# Patient Record
Sex: MALE | Race: WHITE | NOT HISPANIC OR LATINO | Employment: OTHER | URBAN - NONMETROPOLITAN AREA
[De-identification: names, ages, dates, MRNs, and addresses within clinical notes are randomized per-mention and may not be internally consistent; named-entity substitution may affect disease eponyms.]

---

## 2017-01-02 ENCOUNTER — OFFICE VISIT - GICH (OUTPATIENT)
Dept: ORTHOPEDICS | Facility: OTHER | Age: 37
End: 2017-01-02

## 2017-01-02 ENCOUNTER — HISTORY (OUTPATIENT)
Dept: ORTHOPEDICS | Facility: OTHER | Age: 37
End: 2017-01-02

## 2017-01-02 DIAGNOSIS — M75.82 OTHER SHOULDER LESIONS, LEFT SHOULDER: ICD-10-CM

## 2017-01-02 DIAGNOSIS — M75.02 ADHESIVE CAPSULITIS OF LEFT SHOULDER: ICD-10-CM

## 2017-01-11 ENCOUNTER — HOSPITAL ENCOUNTER (OUTPATIENT)
Dept: PHYSICAL THERAPY | Facility: OTHER | Age: 37
Setting detail: THERAPIES SERIES
End: 2017-01-11
Attending: ORTHOPAEDIC SURGERY

## 2017-01-17 ENCOUNTER — HOSPITAL ENCOUNTER (OUTPATIENT)
Dept: PHYSICAL THERAPY | Facility: OTHER | Age: 37
Setting detail: THERAPIES SERIES
End: 2017-01-17
Attending: ORTHOPAEDIC SURGERY

## 2017-01-24 ENCOUNTER — HOSPITAL ENCOUNTER (OUTPATIENT)
Dept: PHYSICAL THERAPY | Facility: OTHER | Age: 37
Setting detail: THERAPIES SERIES
End: 2017-01-24
Attending: ORTHOPAEDIC SURGERY

## 2017-01-30 ENCOUNTER — HISTORY (OUTPATIENT)
Dept: ORTHOPEDICS | Facility: OTHER | Age: 37
End: 2017-01-30

## 2017-01-30 ENCOUNTER — OFFICE VISIT - GICH (OUTPATIENT)
Dept: ORTHOPEDICS | Facility: OTHER | Age: 37
End: 2017-01-30

## 2017-01-30 DIAGNOSIS — M75.02 ADHESIVE CAPSULITIS OF LEFT SHOULDER: ICD-10-CM

## 2017-01-30 DIAGNOSIS — M75.82 OTHER SHOULDER LESIONS, LEFT SHOULDER: ICD-10-CM

## 2017-01-31 ENCOUNTER — HOSPITAL ENCOUNTER (OUTPATIENT)
Dept: PHYSICAL THERAPY | Facility: OTHER | Age: 37
Setting detail: THERAPIES SERIES
End: 2017-01-31
Attending: ORTHOPAEDIC SURGERY

## 2017-02-02 ENCOUNTER — HOSPITAL ENCOUNTER (OUTPATIENT)
Dept: PHYSICAL THERAPY | Facility: OTHER | Age: 37
Setting detail: THERAPIES SERIES
End: 2017-02-02
Attending: ORTHOPAEDIC SURGERY

## 2017-03-15 ENCOUNTER — AMBULATORY - GICH (OUTPATIENT)
Dept: SCHEDULING | Facility: OTHER | Age: 37
End: 2017-03-15

## 2017-04-04 ENCOUNTER — AMBULATORY - GICH (OUTPATIENT)
Dept: SCHEDULING | Facility: OTHER | Age: 37
End: 2017-04-04

## 2017-06-23 ENCOUNTER — HISTORY (OUTPATIENT)
Dept: EMERGENCY MEDICINE | Facility: OTHER | Age: 37
End: 2017-06-23

## 2017-06-25 ENCOUNTER — HISTORY (OUTPATIENT)
Dept: EMERGENCY MEDICINE | Facility: OTHER | Age: 37
End: 2017-06-25

## 2017-06-30 ENCOUNTER — OFFICE VISIT - GICH (OUTPATIENT)
Dept: FAMILY MEDICINE | Facility: OTHER | Age: 37
End: 2017-06-30

## 2017-06-30 ENCOUNTER — HISTORY (OUTPATIENT)
Dept: FAMILY MEDICINE | Facility: OTHER | Age: 37
End: 2017-06-30

## 2017-06-30 DIAGNOSIS — S93.492D SPRAIN OF OTHER LIGAMENT OF LEFT ANKLE, SUBSEQUENT ENCOUNTER: ICD-10-CM

## 2017-07-11 ENCOUNTER — HISTORY (OUTPATIENT)
Dept: EMERGENCY MEDICINE | Facility: OTHER | Age: 37
End: 2017-07-11

## 2017-07-12 ENCOUNTER — OFFICE VISIT - GICH (OUTPATIENT)
Dept: ORTHOPEDICS | Facility: OTHER | Age: 37
End: 2017-07-12

## 2017-07-12 ENCOUNTER — HOSPITAL ENCOUNTER (OUTPATIENT)
Dept: RADIOLOGY | Facility: OTHER | Age: 37
End: 2017-07-12
Attending: ORTHOPAEDIC SURGERY

## 2017-07-12 ENCOUNTER — HISTORY (OUTPATIENT)
Dept: ORTHOPEDICS | Facility: OTHER | Age: 37
End: 2017-07-12

## 2017-07-12 DIAGNOSIS — S93.492D SPRAIN OF OTHER LIGAMENT OF LEFT ANKLE, SUBSEQUENT ENCOUNTER: ICD-10-CM

## 2017-07-12 DIAGNOSIS — S93.492A SPRAIN OF OTHER LIGAMENT OF LEFT ANKLE, INITIAL ENCOUNTER: ICD-10-CM

## 2017-07-21 ENCOUNTER — OFFICE VISIT - GICH (OUTPATIENT)
Dept: ORTHOPEDICS | Facility: OTHER | Age: 37
End: 2017-07-21

## 2017-07-21 ENCOUNTER — HISTORY (OUTPATIENT)
Dept: ORTHOPEDICS | Facility: OTHER | Age: 37
End: 2017-07-21

## 2017-07-21 DIAGNOSIS — M25.572 PAIN IN LEFT ANKLE: ICD-10-CM

## 2017-07-21 DIAGNOSIS — S93.492D SPRAIN OF OTHER LIGAMENT OF LEFT ANKLE, SUBSEQUENT ENCOUNTER: ICD-10-CM

## 2017-08-08 ENCOUNTER — HISTORY (OUTPATIENT)
Dept: ORTHOPEDICS | Facility: OTHER | Age: 37
End: 2017-08-08

## 2017-08-08 ENCOUNTER — OFFICE VISIT - GICH (OUTPATIENT)
Dept: ORTHOPEDICS | Facility: OTHER | Age: 37
End: 2017-08-08

## 2017-08-08 DIAGNOSIS — M25.572 PAIN IN LEFT ANKLE: ICD-10-CM

## 2017-08-17 ENCOUNTER — HOSPITAL ENCOUNTER (OUTPATIENT)
Dept: PHYSICAL THERAPY | Facility: OTHER | Age: 37
Setting detail: THERAPIES SERIES
End: 2017-08-17
Attending: ORTHOPAEDIC SURGERY

## 2017-08-17 DIAGNOSIS — M25.572 PAIN IN LEFT ANKLE: ICD-10-CM

## 2017-08-22 ENCOUNTER — HOSPITAL ENCOUNTER (OUTPATIENT)
Dept: PHYSICAL THERAPY | Facility: OTHER | Age: 37
Setting detail: THERAPIES SERIES
End: 2017-08-22
Attending: ORTHOPAEDIC SURGERY

## 2017-08-24 ENCOUNTER — HOSPITAL ENCOUNTER (OUTPATIENT)
Dept: PHYSICAL THERAPY | Facility: OTHER | Age: 37
Setting detail: THERAPIES SERIES
End: 2017-08-24
Attending: ORTHOPAEDIC SURGERY

## 2017-09-05 ENCOUNTER — HOSPITAL ENCOUNTER (OUTPATIENT)
Dept: PHYSICAL THERAPY | Facility: OTHER | Age: 37
Setting detail: THERAPIES SERIES
End: 2017-09-05
Attending: ORTHOPAEDIC SURGERY

## 2017-09-07 ENCOUNTER — HOSPITAL ENCOUNTER (OUTPATIENT)
Dept: PHYSICAL THERAPY | Facility: OTHER | Age: 37
Setting detail: THERAPIES SERIES
End: 2017-09-07
Attending: ORTHOPAEDIC SURGERY

## 2017-09-12 ENCOUNTER — HOSPITAL ENCOUNTER (OUTPATIENT)
Dept: PHYSICAL THERAPY | Facility: OTHER | Age: 37
Setting detail: THERAPIES SERIES
End: 2017-09-12
Attending: ORTHOPAEDIC SURGERY

## 2017-09-13 ENCOUNTER — OFFICE VISIT - GICH (OUTPATIENT)
Dept: ORTHOPEDICS | Facility: OTHER | Age: 37
End: 2017-09-13

## 2017-09-13 ENCOUNTER — HISTORY (OUTPATIENT)
Dept: ORTHOPEDICS | Facility: OTHER | Age: 37
End: 2017-09-13

## 2017-09-13 DIAGNOSIS — S93.492A SPRAIN OF OTHER LIGAMENT OF LEFT ANKLE, INITIAL ENCOUNTER: ICD-10-CM

## 2017-09-13 DIAGNOSIS — S93.492D SPRAIN OF OTHER LIGAMENT OF LEFT ANKLE, SUBSEQUENT ENCOUNTER: ICD-10-CM

## 2017-09-13 DIAGNOSIS — M25.572 PAIN IN LEFT ANKLE: ICD-10-CM

## 2017-09-14 ENCOUNTER — HOSPITAL ENCOUNTER (OUTPATIENT)
Dept: PHYSICAL THERAPY | Facility: OTHER | Age: 37
Setting detail: THERAPIES SERIES
End: 2017-09-14
Attending: ORTHOPAEDIC SURGERY

## 2017-09-28 ENCOUNTER — HOSPITAL ENCOUNTER (OUTPATIENT)
Dept: PHYSICAL THERAPY | Facility: OTHER | Age: 37
Setting detail: THERAPIES SERIES
End: 2017-09-28
Attending: ORTHOPAEDIC SURGERY

## 2017-10-03 ENCOUNTER — HOSPITAL ENCOUNTER (OUTPATIENT)
Dept: PHYSICAL THERAPY | Facility: OTHER | Age: 37
Setting detail: THERAPIES SERIES
End: 2017-10-03
Attending: ORTHOPAEDIC SURGERY

## 2017-10-05 ENCOUNTER — HOSPITAL ENCOUNTER (OUTPATIENT)
Dept: PHYSICAL THERAPY | Facility: OTHER | Age: 37
Setting detail: THERAPIES SERIES
End: 2017-10-05
Attending: ORTHOPAEDIC SURGERY

## 2017-10-12 ENCOUNTER — HOSPITAL ENCOUNTER (OUTPATIENT)
Dept: PHYSICAL THERAPY | Facility: OTHER | Age: 37
Setting detail: THERAPIES SERIES
End: 2017-10-12
Attending: ORTHOPAEDIC SURGERY

## 2017-10-18 ENCOUNTER — HISTORY (OUTPATIENT)
Dept: ORTHOPEDICS | Facility: OTHER | Age: 37
End: 2017-10-18

## 2017-10-18 ENCOUNTER — OFFICE VISIT - GICH (OUTPATIENT)
Dept: ORTHOPEDICS | Facility: OTHER | Age: 37
End: 2017-10-18

## 2017-10-18 DIAGNOSIS — S93.492D SPRAIN OF OTHER LIGAMENT OF LEFT ANKLE, SUBSEQUENT ENCOUNTER: ICD-10-CM

## 2017-10-18 DIAGNOSIS — G89.29 OTHER CHRONIC PAIN: ICD-10-CM

## 2017-10-18 DIAGNOSIS — M25.572 PAIN IN LEFT ANKLE: ICD-10-CM

## 2017-10-23 ENCOUNTER — HOSPITAL ENCOUNTER (OUTPATIENT)
Dept: PHYSICAL THERAPY | Facility: OTHER | Age: 37
Setting detail: THERAPIES SERIES
End: 2017-10-23
Attending: ORTHOPAEDIC SURGERY

## 2017-10-26 ENCOUNTER — HOSPITAL ENCOUNTER (OUTPATIENT)
Dept: PHYSICAL THERAPY | Facility: OTHER | Age: 37
Setting detail: THERAPIES SERIES
End: 2017-10-26
Attending: ORTHOPAEDIC SURGERY

## 2017-11-02 ENCOUNTER — HOSPITAL ENCOUNTER (OUTPATIENT)
Dept: PHYSICAL THERAPY | Facility: OTHER | Age: 37
Setting detail: THERAPIES SERIES
End: 2017-11-02
Attending: ORTHOPAEDIC SURGERY

## 2017-11-06 ENCOUNTER — HOSPITAL ENCOUNTER (OUTPATIENT)
Dept: PHYSICAL THERAPY | Facility: OTHER | Age: 37
Setting detail: THERAPIES SERIES
End: 2017-11-06
Attending: ORTHOPAEDIC SURGERY

## 2017-11-09 ENCOUNTER — HOSPITAL ENCOUNTER (OUTPATIENT)
Dept: PHYSICAL THERAPY | Facility: OTHER | Age: 37
Setting detail: THERAPIES SERIES
End: 2017-11-09
Attending: ORTHOPAEDIC SURGERY

## 2017-11-15 ENCOUNTER — OFFICE VISIT - GICH (OUTPATIENT)
Dept: ORTHOPEDICS | Facility: OTHER | Age: 37
End: 2017-11-15

## 2017-11-15 ENCOUNTER — HISTORY (OUTPATIENT)
Dept: ORTHOPEDICS | Facility: OTHER | Age: 37
End: 2017-11-15

## 2017-11-15 DIAGNOSIS — G89.29 OTHER CHRONIC PAIN: ICD-10-CM

## 2017-11-15 DIAGNOSIS — M25.572 PAIN IN LEFT ANKLE: ICD-10-CM

## 2017-11-17 ENCOUNTER — HOSPITAL ENCOUNTER (OUTPATIENT)
Dept: PHYSICAL THERAPY | Facility: OTHER | Age: 37
Setting detail: THERAPIES SERIES
End: 2017-11-17
Attending: ORTHOPAEDIC SURGERY

## 2017-11-21 ENCOUNTER — HOSPITAL ENCOUNTER (OUTPATIENT)
Dept: PHYSICAL THERAPY | Facility: OTHER | Age: 37
Setting detail: THERAPIES SERIES
End: 2017-11-21
Attending: ORTHOPAEDIC SURGERY

## 2017-11-27 ENCOUNTER — HOSPITAL ENCOUNTER (OUTPATIENT)
Dept: RADIOLOGY | Facility: OTHER | Age: 37
End: 2017-11-27
Attending: ORTHOPAEDIC SURGERY

## 2017-11-27 DIAGNOSIS — G89.29 OTHER CHRONIC PAIN: ICD-10-CM

## 2017-11-27 DIAGNOSIS — M25.572 PAIN IN LEFT ANKLE: ICD-10-CM

## 2017-12-05 ENCOUNTER — HISTORY (OUTPATIENT)
Dept: ORTHOPEDICS | Facility: OTHER | Age: 37
End: 2017-12-05

## 2017-12-05 ENCOUNTER — OFFICE VISIT - GICH (OUTPATIENT)
Dept: ORTHOPEDICS | Facility: OTHER | Age: 37
End: 2017-12-05

## 2017-12-05 ENCOUNTER — HOSPITAL ENCOUNTER (OUTPATIENT)
Dept: PHYSICAL THERAPY | Facility: OTHER | Age: 37
Setting detail: THERAPIES SERIES
End: 2017-12-05
Attending: ORTHOPAEDIC SURGERY

## 2017-12-05 DIAGNOSIS — S93.402D SPRAIN OF UNSPECIFIED LIGAMENT OF LEFT ANKLE, SUBSEQUENT ENCOUNTER: ICD-10-CM

## 2017-12-05 DIAGNOSIS — M25.572 PAIN IN LEFT ANKLE: ICD-10-CM

## 2017-12-05 DIAGNOSIS — G89.29 OTHER CHRONIC PAIN: ICD-10-CM

## 2017-12-07 ENCOUNTER — HOSPITAL ENCOUNTER (OUTPATIENT)
Dept: PHYSICAL THERAPY | Facility: OTHER | Age: 37
Setting detail: THERAPIES SERIES
End: 2017-12-07
Attending: ORTHOPAEDIC SURGERY

## 2017-12-27 NOTE — PROGRESS NOTES
"Patient Information     Patient Name MRN Sex Matt Thao [id number] Male 1980      Progress Notes by Franklin Martinez, PT at 2017  1:55 PM     Author:  Franklin Martinez, PT Service:  (none) Author Type:  PT- Physical Therapist     Filed:  2017  3:30 PM Date of Service:  2017  1:55 PM Status:  Signed     :  Franklin Martinez PT (PT- Physical Therapist)            [facility]  Outpatient PT - Daily Note       Date of Service: 2017       Visit 3 of 12 authorized by Work Comp. ( An additional 12 visits was approved on 10/5/17)    G Codes updated 10/5/2017     Patient Name: Matt May   YOB: 1980   Referring MD/Provider: Dr. So  Medical and Treatment Diagnosis: Left ankle pain, unspecified chronicity   PT Treatment Diagnosis: pain, weakness, impaired ROM, impaired gait, impaired neuromuscular control  Insurance: Work Comp  Start of Service: 17  Certification Dates: 10/5/17 to 17              Subjective        Patient reports benefit of phonophoresis treatment last session.  He remains compliant with the HEP and current work restrictions.       Rates pain: 2/10 pain at rest.                  Objective        Patient localizes pain to the distal peroneal tendons.    Today's Intervention:      Phonophoresis x 10 minutes, 3 mhz, 1.0 w/cm2 to the distal peroneal tendons     Nustep x 6 minutes, seat 15, level 6--- legs only    Slant board stretch for left calf----pin at 3 on slant board    Floor to waist crate lift x 10 with 20#, x 10 with 30#    Push sled 50 feet with 25#, then pull back 50 feet     MedX HS curl, seat 9, 120# x 15, 2 sets  MedX Hip abduction 100# x 15, 2 sets  MedX knee extension, seat 9, 80# x 15, 2 sets     Step up on to 6\" step x 30       Patient will ice at home today.       Home Exercise Program:    Cold pack and elevation for pain and inflammation control  Warm water for ROM exercises may be helpful  Flex and " "extend toes   Active PF/DF  Ankle circles x 20 each way   Ankle alphabet   Ankle PF with  band x 20  Ankle DF with band x 20  Towel scrunch   Ankle IV and EV AROM  Standing calf stretch 3 x 30\"   SLR x 15  Side hip abduction x 15  Ankle IV and EV with band  Weight shifting  Mini squat  Double calf raise          Assessment    Therapist Assessment / Clinical Impression: Patient is making gradual progress with ROM, strength, neuromuscular control, and gait.  Pain and swelling are gradually improving.  Patient requires additional PT treatment to progress the plan of care and maximize function.     Functional Impairment(s): See subjective on initial evaluation and Functional Assessment / Summary Report from [facility].    Physical Impairment(s):  pain, weakness, impaired ROM, impaired gait, impaired neuromuscular control      Patient Specific Functional and Pain Scales (PSFS): G Codes updated 10/5/2017   Clinician Instructions: Complete after the history and before the exam.    Initial Assessment: We want to know what 3 activities in your life you are unable to perform, or are having the most difficulty performing, as a result of your chief problem. Please list and score at least 3 activities that you are unable to perform, or having the most difficulty performing, because of your chief problem.   Patient Specific Activity Scoring Scheme (score one number for each activity):   Activity Score (0-10)  0= Unable to perform activity  10= Able to perform activity at same level as before injury or problem   1. Walking with full weight on left foot  6/10   2. Single leg stand on left foot 0/10   3. Squatting  5/10   4. Work  0/10   5.     Totals:  11/40 =27 % ability which relates to 73% impairment    Patient verbally states that they understand that the information they have provided above is current and complete to the best of their knowledge.    Patient Specific Functional Scale Modifier Scale Conversion: (patient's modifier that " correlates with pt's score on PSFS): CL (73% Impaired).    G codes and Modifier taken from pt completing a PSFS: completed at initial evaluation   Initial Primary G Code and Modifier:    Per the Patient's intake and/or assessment the Primary G Code is: Mobility .   The Patient's Impairment, Limitation or Restriction Modifier would be best described as: CN - 100% Impairment.     Goal Primary G Code and Modifier:    The Patient's G Code Goal would be: Mobility    The Patient's Impairment, Limitation or Restriction Modifier goal would be best described as: CJ - 20% - 40% Impairment.       G Codes updated 10/5/2017  Per the Patient's intake and/or assessment the Primary G Code is Mobility .  The Patient's Impairment, Limitation or Restriction Modifier would be best described as CL - 60% - 80% Impairment.     The Patient's G Code Goal would be Mobility   The Patient's Impairment, Limitation or Restriction Modifier would be best described as CJ - 20% - 40% Impairment.       Goals: Reviewed 10/5/2017   Functional Short Term Goals (4 weeks):   Patient will walk with a normal gait pattern. Progressing  Patient will be able to sleep with no disruptions due to pain. Progressing  Patient will have improved lower extremity mobility to allow for improved dressing and self-cares with minimal to no pain. Progressing    Functional Long Term Goals (8 weeks):   Patient will self-manage symptoms and return to prior level of function.  Progressing  Patient will be independent in their Home Exercise Program.Progressing  Patient will tolerate walking with less than 2/10 pain up to 30 minutes. Progressing  Patient will complete stairs with minimal to no pain. Progressing  Patient will demonstrate improved strength to allow squatting and lifting activities with no difficulty completing work related tasks. Progressing  Stand on left foot without pain. Progressing      Patient participated in goal selection and  understand(s) the plan of care: Yes   Patient Potential for Achieving Desired Outcome: Good       Response to Intervention:  Good response to treatment. Patient verbalized understanding of HEP.         Plan    Treatment Plan:      Frequency:   12 visits    Duration of Treatment: 8 weeks    Planned Interventions:    Home Exercise Program development  Therapeutic Exercise (ROM & Strengthening)  Therapeutic Activities  Neuromuscular Re-education  Manual Therapy  Ultrasound  E-Stim  Gait Training  Hot Packs / Cold Pack Modalities  Vasopneumatic Compression with Cold Therapy ( Game Ready )  Phonophoresis with Ketoprofen  Iontophoresis with Dexamethasone      Plan for next visit:  Progress exercises as symptoms allow.  Manual therapy and modalities as indicated.

## 2017-12-27 NOTE — PROGRESS NOTES
"Patient Information     Patient Name MRN Sex Matt Thao [id number] Male 1980      Progress Notes by Franklin Martinez, PT at 2017  3:26 PM     Author:  Franklin Martinez, PT Service:  (none) Author Type:  PT- Physical Therapist     Filed:  2017  5:22 PM Date of Service:  2017  3:26 PM Status:  Signed     :  Franklin Martinez PT (PT- Physical Therapist)            [facility]  Outpatient PT - Daily Note     Date of Service: 2017     Visit 7  12 authorized by Work Comp.     Patient Name: Matt May   YOB: 1980   Referring MD/Provider: Dr. So  Medical and Treatment Diagnosis: Left ankle pain, unspecified chronicity   PT Treatment Diagnosis: pain, weakness, impaired ROM, impaired gait, impaired neuromuscular control  Insurance: Work Comp  Start of Service: 17  Certification Dates: Start of Service: 17  Medicare/[place] Re-Cert Due: 10/12/17               Subjective      Patient had a follow up with Dr. So on 17.  He was given the ok to begin weaning from the CAM boot.  He will be getting a lace up ankle brace next week.  Patient remains off work at this time.       Rates pain: 3/10 pain at rest.   Left heel pain increases to a 6/10.                 Objective          Today's Intervention:      Nustep x 5 minutes, seat 15, arms 10, level 4     Exercises:  Standing calf stretch   Left calf stretch with towel 3 x 30\"----Dorsiflexion to neutral    Ankle PF with black band x 20  Ankle DF with red x 20    Added:  Standing calf stretch 3 x 30\"   SLR x 15  Side hip abduction x 15  Ankle IV and EV with yellow    Game Ready x 15 minutes, medium compression to left ankle      Home Exercise Program:    Discussed use of cold pack and elevation for pain and inflammation control  Warm water for ROM exercises may be helpful  Flex and extend toes   Active PF/DF  Ankle circles x 20 each way   Ankle alphabet   Left calf stretch with towel 3 x " "30\"  Ankle PF with green band x 20  Ankle DF with red x 20  Towel scrunch  Weight shifting onto left foot with canes   Ankle IV and EV AROM  Standing calf stretch 3 x 30\"   SLR x 15  Side hip abduction x 15  Ankle IV and EV with yellow        Assessment    Therapist Assessment / Clinical Impression:  Signs and symptoms consistent with left lateral ankle sprain.  The patient is appropriate for physical therapy to address their pain, functional limitations, and physical impairments.     Functional Impairment(s): See subjective on initial evaluation and Functional Assessment / Summary Report from [facility].    Physical Impairment(s):  pain, weakness, impaired ROM, impaired gait, impaired neuromuscular control      Patient Specific Functional and Pain Scales (PSFS):    Clinician Instructions: Complete after the history and before the exam.    Initial Assessment: We want to know what 3 activities in your life you are unable to perform, or are having the most difficulty performing, as a result of your chief problem. Please list and score at least 3 activities that you are unable to perform, or having the most difficulty performing, because of your chief problem.   Patient Specific Activity Scoring Scheme (score one number for each activity):   Activity Score (0-10)  0= Unable to perform activity  10= Able to perform activity at same level as before injury or problem   1. Walking with full weight on left foot  0/10   2. Single leg stand on left foot 0/10   3. Squatting  0/10   4. Work  0/10   5.     Totals:  0/40 = 0 % ability which relates to 100% impairment    Patient verbally states that they understand that the information they have provided above is current and complete to the best of their knowledge.    Patient Specific Functional Scale Modifier Scale Conversion: (patient's modifier that correlates with pt's score on PSFS): 0-CN (100% Impaired).    G codes and Modifier taken from pt completing a PSFS: completed at initial " evaluation   Initial Primary G Code and Modifier:    Per the Patient's intake and/or assessment the Primary G Code is: Mobility .   The Patient's Impairment, Limitation or Restriction Modifier would be best described as: CN - 100% Impairment.     Goal Primary G Code and Modifier:    The Patient's G Code Goal would be: Mobility    The Patient's Impairment, Limitation or Restriction Modifier goal would be best described as: CJ - 20% - 40% Impairment.         Goals:  Functional Short Term Goals (4 weeks):   Patient will walk with a normal gait pattern.  Patient will be able to sleep with no disruptions due to pain.  Patient will have improved lower extremity mobility to allow for improved dressing and self-cares with minimal to no pain.    Functional Long Term Goals (8 weeks):   Patient will self-manage symptoms and return to prior level of function.    Patient will be independent in their Home Exercise Program.  Patient will tolerate walking with less than 2/10 pain up to 30 minutes.  Patient will complete stairs with minimal to no pain.  Patient will demonstrate improved strength to allow squatting and lifting activities with no difficulty completing work related tasks.  Stand on left foot without pain.       Patient participated in goal selection and understand(s) the plan of care: Yes   Patient Potential for Achieving Desired Outcome: Good       Response to Intervention:  Good response to treatment. Patient verbalized understanding of HEP.       Plan    Treatment Plan:      Frequency:   12 visits    Duration of Treatment: 8 weeks    Planned Interventions:    Home Exercise Program development  Therapeutic Exercise (ROM & Strengthening)  Therapeutic Activities  Neuromuscular Re-education  Manual Therapy  Ultrasound  E-Stim  Gait Training  Hot Packs / Cold Pack Modalities  Vasopneumatic Compression with Cold Therapy ( Game Ready )  Phonophoresis with Ketoprofen  Iontophoresis with Dexamethasone      Plan for  next visit:  Progress exercises as symptoms allow.  Manual therapy and modalities as indicated.

## 2017-12-27 NOTE — PROGRESS NOTES
"Patient Information     Patient Name MRN Sex Matt Thao [id number] Male 1980      Progress Notes by Franklin Martinez, PT at 2017  2:31 PM     Author:  Franklin Martinez PT Service:  (none) Author Type:  PT- Physical Therapist     Filed:  2017  3:24 PM Date of Service:  2017  2:31 PM Status:  Signed     :  Franklin Martinez PT (PT- Physical Therapist)            [facility]  Outpatient PT - Daily Note       Date of Service: 2017       Visit 4 of 12 authorized by Work Comp. ( An additional 12 visits was approved on 10/5/17)    G Codes updated 10/5/2017     Patient Name: Matt May   YOB: 1980   Referring MD/Provider: Dr. So  Medical and Treatment Diagnosis: Left ankle pain, unspecified chronicity   PT Treatment Diagnosis: pain, weakness, impaired ROM, impaired gait, impaired neuromuscular control  Insurance: [employer]  Start of Service: 17  Certification Dates: 10/5/17 to 17              Subjective        Patient reports the medial and lateral ankle remain painful.  States when he takes a full step on the left without the cane he has pain. He does feel that he is gradually improving overall.        Rates pain: 2/10 pain at rest.                  Objective        Patient localizes pain to the distal peroneal tendons.    Today's Intervention:      Phonophoresis x 10 minutes, 3 mhz, 1.0 w/cm2 to the distal peroneal tendons     Nustep x 6 minutes, seat 15, level 6--- legs only    Slant board stretch for left calf----pin at 3 on slant board    Floor to waist crate lift x 10 with 20#, x 10 with 30#    Push sled 50 feet with 25#, then pull back 50 feet x 2     MedX HS curl, seat 9, 120# x 15, 2 sets  MedX Hip abduction 100# x 30, 2 sets  MedX knee extension, seat 9, 80# x 15, 2 sets     Step up on to 6\" step x 35      Patient will ice at home today.       Home Exercise Program:    Cold pack and elevation for pain and inflammation " "control  Warm water for ROM exercises may be helpful  Flex and extend toes   Active PF/DF  Ankle circles x 20 each way   Ankle alphabet   Ankle PF with  band x 20  Ankle DF with band x 20  Towel scrunch   Ankle IV and EV AROM  Standing calf stretch 3 x 30\"   SLR x 15  Side hip abduction x 15  Ankle IV and EV with band  Weight shifting  Mini squat  Double calf raise          Assessment    Therapist Assessment / Clinical Impression: Patient is making gradual progress with ROM, strength, neuromuscular control, and gait.  Pain and swelling are gradually improving.  Patient requires additional PT treatment to progress the plan of care and maximize function.     Functional Impairment(s): See subjective on initial evaluation and Functional Assessment / Summary Report from [facility].    Physical Impairment(s):  pain, weakness, impaired ROM, impaired gait, impaired neuromuscular control      Patient Specific Functional and Pain Scales (PSFS): G Codes updated 10/5/2017   Clinician Instructions: Complete after the history and before the exam.    Initial Assessment: We want to know what 3 activities in your life you are unable to perform, or are having the most difficulty performing, as a result of your chief problem. Please list and score at least 3 activities that you are unable to perform, or having the most difficulty performing, because of your chief problem.   Patient Specific Activity Scoring Scheme (score one number for each activity):   Activity Score (0-10)  0= Unable to perform activity  10= Able to perform activity at same level as before injury or problem   1. Walking with full weight on left foot  6/10   2. Single leg stand on left foot 0/10   3. Squatting  5/10   4. Work  0/10   5.     Totals:  11/40 =27 % ability which relates to 73% impairment    Patient verbally states that they understand that the information they have provided above is current and complete to the best of their knowledge.    Patient Specific " Functional Scale Modifier Scale Conversion: (patient's modifier that correlates with pt's score on PSFS): CL (73% Impaired).    G codes and Modifier taken from pt completing a PSFS: completed at initial evaluation   Initial Primary G Code and Modifier:    Per the Patient's intake and/or assessment the Primary G Code is: Mobility .   The Patient's Impairment, Limitation or Restriction Modifier would be best described as: CN - 100% Impairment.     Goal Primary G Code and Modifier:    The Patient's G Code Goal would be: Mobility    The Patient's Impairment, Limitation or Restriction Modifier goal would be best described as: CJ - 20% - 40% Impairment.       G Codes updated 10/5/2017  Per the Patient's intake and/or assessment the Primary G Code is Mobility .  The Patient's Impairment, Limitation or Restriction Modifier would be best described as CL - 60% - 80% Impairment.     The Patient's G Code Goal would be Mobility   The Patient's Impairment, Limitation or Restriction Modifier would be best described as CJ - 20% - 40% Impairment.       Goals: Reviewed 10/5/2017   Functional Short Term Goals (4 weeks):   Patient will walk with a normal gait pattern. Progressing  Patient will be able to sleep with no disruptions due to pain. Progressing  Patient will have improved lower extremity mobility to allow for improved dressing and self-cares with minimal to no pain. Progressing    Functional Long Term Goals (8 weeks):   Patient will self-manage symptoms and return to prior level of function.  Progressing  Patient will be independent in their Home Exercise Program.Progressing  Patient will tolerate walking with less than 2/10 pain up to 30 minutes. Progressing  Patient will complete stairs with minimal to no pain. Progressing  Patient will demonstrate improved strength to allow squatting and lifting activities with no difficulty completing work related tasks. Progressing  Stand on left foot without pain.  Progressing      Patient participated in goal selection and understand(s) the plan of care: Yes   Patient Potential for Achieving Desired Outcome: Good       Response to Intervention:  Good response to treatment. Patient verbalized understanding of HEP.         Plan    Treatment Plan:      Frequency:   12 visits    Duration of Treatment: 8 weeks    Planned Interventions:    Home Exercise Program development  Therapeutic Exercise (ROM & Strengthening)  Therapeutic Activities  Neuromuscular Re-education  Manual Therapy  Ultrasound  E-Stim  Gait Training  Hot Packs / Cold Pack Modalities  Vasopneumatic Compression with Cold Therapy ( Game Ready )  Phonophoresis with Ketoprofen  Iontophoresis with Dexamethasone      Plan for next visit:  Progress exercises as symptoms allow.  Manual therapy and modalities as indicated.

## 2017-12-27 NOTE — PROGRESS NOTES
Patient Information     Patient Name MRN Sex Matt Thao [id number] Male 1980      Progress Notes by Dontrell So DO at 2017  1:15 PM     Author:  Dontrell So DO Service:  (none) Author Type:  PHYS- Osteopathic     Filed:  2017  2:36 PM Encounter Date:  2017 Status:  Signed     :  Dontrell So DO (PHYS- Osteopathic)            Matt May was seen in consultation for Abel Gan MD for a chief complaint of left ankle injury and pain    CHIEF COMPLAINT: Matt May is a 36 y.o.  male  Chief Complaint     Patient presents with       Occ Med      left ankle injury       HISTORY OF PRESENTING INJURY:  This 36-year-old male relates he sustained a left ankle injury at work on 17. The patient jumped off a truck. He landed on a 3 inch hose and rolled his ankle. He describes a supination, inversion type injury. Relates he experienced pain to the outside of the ankle region. Noticed swelling. Seen in the emergency room that day. X-rays of the ankle were taken. No fractures identified. He returned to the emergency room 2 days later with complaint of continued ankle pain. A CAT scan of the ankle was performed on 17. A venous ultrasound of the lower extremity was performed on 17. Negative for a DVT.  The patient was referred to orthopedics.  Relates he has crutches available. He had a knee scooter that he borrowed from someone but found it difficult to use. He is trying to keep weight off the foot.  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  No complaint of knee pain.  Soreness in the calf.    REVIEW OF SYSTEMS:  Constitutional:  Denies constitutional problems  Cardiovascular: normal  Respiratory: normal    The review of systems as documented in the HPI and on the intake questionnaire, completed by the patient 2017, have been reviewed by myself and the pertinent positives and negatives addressed.  The remainder of the complete  review of systems was non-contributory.    (PFSH) PAST, FAMILY, and/or SOCIAL HISTORY:    PAST MEDICAL HISTORY:  Past Medical History:     Diagnosis  Date     Adhesive capsulitis of left shoulder 11/29/2016     GERD (gastroesophageal reflux disease)      Mantoux: positive 2009    negative chest x-ray. Completed isoniazid.      Tendinitis of left rotator cuff 11/28/2016       PAST SURGICAL HISTORY:  Past Surgical History:      Procedure  Laterality Date     APPENDECTOMY  2005       FAMILY HISTORY:  No family history on file.    ALLERGIES:  Allergies     Allergen  Reactions     Codeine Rash and Itching     Pcn [Penicillins] Hives and Edema       CURRENT MEDICATIONS:  Current Outpatient Prescriptions       Medication  Sig Dispense Refill     cyclobenzaprine (FLEXERIL) 10 mg tablet Take 1 tablet by mouth 3 times daily. 30 tablet 3     ibuprofen (ADVIL; MOTRIN) 200 mg tablet Take 800 mg by mouth every 6 hours if needed.       oxyCODONE 10 mg tablet Take 1 tablet by mouth every 4 hours if needed  for Pain 25 tablet 0     Walker Rolling Walker for home use. 1 Device 0     No current facility-administered medications for this visit.      Medications have been reviewed by me and are current to the best of my knowledge and ability.      SOCIAL HISTORY:    Additional [facility] information documented on the intake form completed by the patient 7/12/2017 was reviewed by myself.    PHYSICAL EXAM:   /82  Pulse 84  Ht 1.829 m (6')  Wt 133.4 kg (294 lb)  BMI 39.87 kg/m2 Body mass index is 39.87 kg/(m^2).    General Appearance: Pleasant male in good appearance, mood and affect.  Alert and orientated times three ( time, date and location).    left ankle/ foot:  Examination demonstrates no left knee effusion. Comfortable knee motion. Soreness to the soft tissues of the left calf with palpation. The calf is supple.  The Achilles tendon appears palpable and intact.  Large amount of soft tissue swelling around the left ankle. Pain  with palpation to the lateral ankle complex. Mild to moderate pain along the medial ankle complex.  Increased pain with active or passive ankle motion.  Palpable dorsalis pedis pulse.  Generalized moderate swelling of the foot.  Sensory intact.    Xray and or MRI/MRA:  Radiographic images where independently reviewed by myself and discussed with the patient.  Attending Doctor: ZACHARIAH WILLIS ([id number])  :       FLACO MICHEL ([id number])  Report Date:       06/23/2017 10:18:11  Report Status:       Final  ======================= Begin of Report Content ======================    PROCEDURE: XR ANKLE 3 VIEWS LEFT  HISTORY: ANKLE INJURY; .  COMPARISON: 01/11/2012  TECHNIQUE: 3 views of the left ankle were obtained.  FINDINGS: No fracture or dislocation is identified. The joint spaces are preserved. There is marked soft tissue swelling laterally.  IMPRESSION: No acute fracture.  Electronically Signed By: Flaco Michel M.D. on 6/23/2017 10:13 AM    Attending Doctor: SANIYA KRAFT ([id number])  :       DAISY STONE ([id number])  Report Date:       06/25/2017 17:02:51  Report Status:       Final  ======================= Begin of Report Content ======================    EXAM:CT ANKLE LEFT WO  REASON FOR EXAM:ANKLE PAIN/PROBLEM;  CLINICAL HISTORY: Patient Age 36 years  COMPARISON: Conventional radiograph 06/23/2017  TECHNIQUE: Axial images acquired without contrast with coronal and sagittal two-dimensional reformatted images  CONTRAST: None  FINDINGS: No ankle fracture is identified. There is marked diffuse subcutaneous edema about the left ankle. Focal hematoma adjacent to the lateral malleolus measuring approximately 3.5 x 1.5 x 4.1 cm in craniocaudad, transverse, and AP dimension. Small ankle effusion, including fluid in the anterolateral ankle recess. The anterior talofibular ligament is visualized and is likely torn. This would be better evaluated with MRI. Ankle tendons appear grossly normal. No  appreciable degenerative arthritis.    IMPRESSION:  1. Probable high-grade or full-thickness tearing anterior talofibular ligament.  2. Diffuse soft tissue edema about the left ankle with focal hematoma adjacent to the lateral malleolus.  3. Small ankle effusion.  4. No fracture identified.  Electronically Signed By: Sarah Gallego M.D. on 6/25/2017 4:58 PM    Attending Doctor: THOMAS HUSSEIN ([id number])  :       MORGAN HUGHES ([id number])  Report Date:       07/11/2017 19:07:53  Report Status:       Final  ======================= Begin of Report Content ======================    Exam:US VENOUS LOWER EXTREMITY LEFT  History: Left leg pain  Comparisons:None.  Technique: Venous ultrasound left lower extremities  Findings: There is normal compressibility, augmentation and respiratory phasicity in the common femoral superficial femoral popliteal and calf veins. Normal compressiblity is seen in the greater saphenous vein.    Impression: No evidence of deep venous thrombosis in the left lower extremity  Electronically Signed By: Morgan Hughes M.D. on 7/11/2017 7:03 PM    X-rays of the left tibia and fibula demonstrates no fractures.     IMPRESSION:  Left ankle sprain, severe with history of supination injury.  History of injury occurring at work on 6/23/17, jumping off a truck.  Swelling of the left ankle and foot.  No fractures identified.    PLAN:  Discussion included review of studies.  Recommendation includes cast immobilization of the ankle.  The patient tried a Cam Walker but was unsuccessful with use.  Also tried a knee scooter but had difficulty. Does better with crutches.    Procedure: The patient was taken to the cast room. A left short leg fiberglass cast was applied. Well-padded.  Cast care instructions.    Recommend continued use of crutches and balance weight only on the foot.  Otherwise ice and elevate above the heart.    Paperwork filled out: No work at this point. Recheck in about 10 days.  No x-rays needed.  Recommend casting about 4 weeks.    Questions answered.    Dontrell So D.O., SARTHAK.  Orthopedic Surgeon    [facility] and [address] [address]  Phone [phone]  Fax [phone]    2:24 PM 7/12/2017

## 2017-12-27 NOTE — PROGRESS NOTES
"Patient Information     Patient Name MRN Sex Matt Thao [id number] Male 1980      Progress Notes by Franklin Martinez, PT at 10/12/2017  3:18 PM     Author:  Franklin Martinez, PT Service:  (none) Author Type:  PT- Physical Therapist     Filed:  10/12/2017  4:09 PM Date of Service:  10/12/2017  3:18 PM Status:  Signed     :  Franklin Martinez PT (PT- Physical Therapist)            [facility]  Outpatient PT - Daily Note       Date of Service: 10/12/2017      Visit 11of 12 authorized by Work Comp. ( An additional 12 visits was approved on 10/5/17)    G Codes updated 10/5/2017     Patient Name: Matt May   YOB: 1980   Referring MD/Provider: Dr. oS  Medical and Treatment Diagnosis: Left ankle pain, unspecified chronicity   PT Treatment Diagnosis: pain, weakness, impaired ROM, impaired gait, impaired neuromuscular control  Insurance: [employer] Comp  Start of Service: 17  Certification Dates: 10/5/17 to 17              Subjective        Patient reports gradual progress with the left ankle.  He is starting to have more good days than bad days.  He remains compliant with the HEP and current work restrictions.     Rates pain: 2-3/10 pain at rest.                  Objective            Today's Intervention:      Nustep x 5 minutes, seat 15, arms 10, level 4     Slant board stretch for left calf----pin at 3 on slant board    Exercises:  Weight shifting----fwd,back, side/side  Single leg stand with UE support, hold 5\"   Mini squat x 20  Double calf raise x 20  Marching     Grade 3 talocrural mobs   Grade 3 distraction of talocrural joint  Manual achilles stretch with patient in long sitting    Game ready x 10 minutes, low compression, left ankle      Home Exercise Program:    Cold pack and elevation for pain and inflammation control  Warm water for ROM exercises may be helpful  Flex and extend toes   Active PF/DF  Ankle circles x 20 each way   Ankle alphabet " "  Ankle PF with  band x 20  Ankle DF with band x 20  Towel scrunch   Ankle IV and EV AROM  Standing calf stretch 3 x 30\"   SLR x 15  Side hip abduction x 15  Ankle IV and EV with band  Weight shifting  Mini squat  Double calf raise          Assessment    Therapist Assessment / Clinical Impression: Patient is making gradual progress with ROM, strength, neuromuscular control, and gait.  Pain and swelling are gradually improving.  Patient requires additional PT treatment to progress the plan of care and maximize function.     Functional Impairment(s): See subjective on initial evaluation and Functional Assessment / Summary Report from [facility].    Physical Impairment(s):  pain, weakness, impaired ROM, impaired gait, impaired neuromuscular control      Patient Specific Functional and Pain Scales (PSFS): G Codes updated 10/5/2017   Clinician Instructions: Complete after the history and before the exam.    Initial Assessment: We want to know what 3 activities in your life you are unable to perform, or are having the most difficulty performing, as a result of your chief problem. Please list and score at least 3 activities that you are unable to perform, or having the most difficulty performing, because of your chief problem.   Patient Specific Activity Scoring Scheme (score one number for each activity):   Activity Score (0-10)  0= Unable to perform activity  10= Able to perform activity at same level as before injury or problem   1. Walking with full weight on left foot  6/10   2. Single leg stand on left foot 0/10   3. Squatting  5/10   4. Work  0/10   5.     Totals:  11/40 =27 % ability which relates to 73% impairment    Patient verbally states that they understand that the information they have provided above is current and complete to the best of their knowledge.    Patient Specific Functional Scale Modifier Scale Conversion: (patient's modifier that correlates with pt's score on PSFS): CL (73% Impaired).    G codes and " Modifier taken from pt completing a PSFS: completed at initial evaluation   Initial Primary G Code and Modifier:    Per the Patient's intake and/or assessment the Primary G Code is: Mobility .   The Patient's Impairment, Limitation or Restriction Modifier would be best described as: CN - 100% Impairment.     Goal Primary G Code and Modifier:    The Patient's G Code Goal would be: Mobility    The Patient's Impairment, Limitation or Restriction Modifier goal would be best described as: CJ - 20% - 40% Impairment.       G Codes updated 10/5/2017  Per the Patient's intake and/or assessment the Primary G Code is Mobility .  The Patient's Impairment, Limitation or Restriction Modifier would be best described as CL - 60% - 80% Impairment.     The Patient's G Code Goal would be Mobility   The Patient's Impairment, Limitation or Restriction Modifier would be best described as CJ - 20% - 40% Impairment.       Goals: Reviewed 10/5/2017   Functional Short Term Goals (4 weeks):   Patient will walk with a normal gait pattern. Progressing  Patient will be able to sleep with no disruptions due to pain. Progressing  Patient will have improved lower extremity mobility to allow for improved dressing and self-cares with minimal to no pain. Progressing    Functional Long Term Goals (8 weeks):   Patient will self-manage symptoms and return to prior level of function.  Progressing  Patient will be independent in their Home Exercise Program.Progressing  Patient will tolerate walking with less than 2/10 pain up to 30 minutes. Progressing  Patient will complete stairs with minimal to no pain. Progressing  Patient will demonstrate improved strength to allow squatting and lifting activities with no difficulty completing work related tasks. Progressing  Stand on left foot without pain. Progressing      Patient participated in goal selection and understand(s) the plan of care: Yes   Patient Potential for Achieving Desired  Outcome: Good       Response to Intervention:  Good response to treatment. Patient verbalized understanding of HEP.         Plan    Treatment Plan:      Frequency:   12 visits    Duration of Treatment: 8 weeks    Planned Interventions:    Home Exercise Program development  Therapeutic Exercise (ROM & Strengthening)  Therapeutic Activities  Neuromuscular Re-education  Manual Therapy  Ultrasound  E-Stim  Gait Training  Hot Packs / Cold Pack Modalities  Vasopneumatic Compression with Cold Therapy ( Game Ready )  Phonophoresis with Ketoprofen  Iontophoresis with Dexamethasone      Plan for next visit:  Progress exercises as symptoms allow.  Manual therapy and modalities as indicated.

## 2017-12-27 NOTE — PROGRESS NOTES
Patient Information     Patient Name MRN Sex Matt Thao [id number] Male 1980      Progress Notes by Abel Gan MD at 2017 12:45 PM     Author:  Abel Gan MD Service:  (none) Author Type:  Physician     Filed:  2017  1:11 PM Encounter Date:  2017 Status:  Signed     :  Abel Gan MD (Physician)            SUBJECTIVE:    Matt May is a 36 y.o. male who presents for left ankle injury    HPI    On  at work he got tangled in a  hose and rolled his ankle.  He is not sure if it was an inversion or eversion injury.  Was seen in the emergency department, placed in a CAM walker.  Has not been able to put it on due to the edema.  [place] a snap when it happened. Lots of pain and burning laterally.  Ice and elevation not helping.  Cannot put weight on it.  5 mg oxycodone not helping.  Had a follow up in the emergency department due to the pain, and had a CT showing:    IMPRESSION:  1. Probable high-grade or full-thickness tearing anterior talofibular ligament.  2. Diffuse soft tissue edema about the left ankle with focal hematoma adjacent to the lateral malleolus.  3. Small ankle effusion.  4. No fracture identified.     Electronically Signed By: Sarah Gallego M.D. on 2017 4:58 PM    Allergies     Allergen  Reactions     Codeine Rash and Itching     Pcn [Penicillins] Hives and Edema   ,   Current Outpatient Prescriptions on File Prior to Visit       Medication  Sig Dispense Refill     cyclobenzaprine (FLEXERIL) 10 mg tablet Take 1 tablet by mouth 3 times daily. 30 tablet 3     ibuprofen (ADVIL; MOTRIN) 600 mg tablet Take 600 mg by mouth 4 times daily if needed. Maximum of 3200 mg in 24 hours.       Walker Rolling Walker for home use. 1 Device 0     No current facility-administered medications on file prior to visit.     and   Past Surgical History:      Procedure  Laterality Date     APPENDECTOMY         REVIEW OF SYSTEMS:  Review of Systems    Constitutional: Negative for chills and fever.   Musculoskeletal: Positive for joint pain.   Neurological: Positive for tingling.       OBJECTIVE:  /80  Pulse 80  Ht 1.829 m (6')  Wt 133.4 kg (294 lb)  BMI 39.87 kg/m2    EXAM:   Physical Exam   Constitutional: He is oriented to person, place, and time and well-developed, well-nourished, and in no distress. No distress.   Musculoskeletal:   Left ankle with significant edema diffusely.  Tender over the lat malleolus.  Unable to move in any direction at all due to pain.  ecchymosis into the toes and medial foot.   Neurological: He is alert and oriented to person, place, and time.   Skin: He is not diaphoretic.       ASSESSMENT/PLAN:    ICD-10-CM    1. Tear of talofibular ligament, left, subsequent encounter S93.492D AMB CONSULT TO ORTHOPEDICS - AFFILIATE ONLY      oxyCODONE 10 mg tablet        Plan:  Will increase the dose of the oxycodone.  I want some compression for the edema, so will use an ACE wrap for now while waiting for Orthopedics consult.  No weight bearing.  No work, given need to elevate above heart level.    Abel Gan MD ....................  6/30/2017   1:11 PM

## 2017-12-27 NOTE — PROGRESS NOTES
Patient Information     Patient Name MRN Sex Matt Thao [id number] Male 1980      Progress Notes by Franklin Martinez, PT at 10/3/2017  3:16 PM     Author:  Franklin Martinez PT Service:  (none) Author Type:  PT- Physical Therapist     Filed:  10/3/2017  4:32 PM Date of Service:  10/3/2017  3:16 PM Status:  Signed     :  Franklin Martinez PT (PT- Physical Therapist)            [facility]  Outpatient PT - Daily Note     Date of Service: 10/3/2017     Visit  authorized by Work Comp.     Patient Name: Matt May   YOB: 1980   Referring MD/Provider: Dr. So  Medical and Treatment Diagnosis: Left ankle pain, unspecified chronicity   PT Treatment Diagnosis: pain, weakness, impaired ROM, impaired gait, impaired neuromuscular control  Insurance: Work Comp  Start of Service: 17  Certification Dates: Start of Service: 17  Medicare/MA Re-Cert Due: 10/12/17               Subjective        Patient reports gradual improvement with the left ankle.  He remains compliant with the HEP and current work restrictions.  States he wears the left ankle lace up brace as directed.  He continues to use a single point cane for gait.       Rates pain: 1/10 pain at rest.   Pain increases to 6/10 standing and walking for 30 minutes                 Objective          Ankle Range of Motion   ACTIVE  PASSIVE      Right Left Right Left Normal   Dorsiflexion  0   20   Plantarflexion  40   50   Inversion  18   35   Eversion  7   15       NOTES: ankle dorsiflexion limited by pain during closed chain motion during gait.       Today's Intervention:      Nustep x 5 minutes, seat 15, arms 10, level 4     Slant board stretch for left calf----pin at 3 on slant board    Grade 3 talocrural mobs   Grade 3 posterior/superior glide of the distal fibula  MWM with to improve left ankle dorsiflexion    Exercises:  Weight shifting  Mini squat x 20  Double calf raise x 15     Ankle IV and EV  "with red---home today  Ankle PF with black band x 20--home today  Ankle DF with red x 20--home today    Game Ready x 15 minutes, medium compression to left ankle      Home Exercise Program:    Discussed use of cold pack and elevation for pain and inflammation control  Warm water for ROM exercises may be helpful  Flex and extend toes   Active PF/DF  Ankle circles x 20 each way   Ankle alphabet   Left calf stretch with towel 3 x 30\"  Ankle PF with green band x 20  Ankle DF with red x 20  Towel scrunch  Weight shifting onto left foot with canes   Ankle IV and EV AROM  Standing calf stretch 3 x 30\"   SLR x 15  Side hip abduction x 15  Ankle IV and EV with band  Weight shifting  Mini squat  Double calf raise          Assessment    Therapist Assessment / Clinical Impression:  Signs and symptoms consistent with left lateral ankle sprain.  The patient is appropriate for physical therapy to address their pain, functional limitations, and physical impairments.     Functional Impairment(s): See subjective on initial evaluation and Functional Assessment / Summary Report from [facility].    Physical Impairment(s):  pain, weakness, impaired ROM, impaired gait, impaired neuromuscular control      Patient Specific Functional and Pain Scales (PSFS):    Clinician Instructions: Complete after the history and before the exam.    Initial Assessment: We want to know what 3 activities in your life you are unable to perform, or are having the most difficulty performing, as a result of your chief problem. Please list and score at least 3 activities that you are unable to perform, or having the most difficulty performing, because of your chief problem.   Patient Specific Activity Scoring Scheme (score one number for each activity):   Activity Score (0-10)  0= Unable to perform activity  10= Able to perform activity at same level as before injury or problem   1. Walking with full weight on left foot  0/10   2. Single leg stand on left foot 0/10 "   3. Squatting  0/10   4. Work  0/10   5.     Totals:  0/40 = 0 % ability which relates to 100% impairment    Patient verbally states that they understand that the information they have provided above is current and complete to the best of their knowledge.    Patient Specific Functional Scale Modifier Scale Conversion: (patient's modifier that correlates with pt's score on PSFS): 0-CN (100% Impaired).    G codes and Modifier taken from pt completing a PSFS: completed at initial evaluation   Initial Primary G Code and Modifier:    Per the Patient's intake and/or assessment the Primary G Code is: Mobility .   The Patient's Impairment, Limitation or Restriction Modifier would be best described as: CN - 100% Impairment.     Goal Primary G Code and Modifier:    The Patient's G Code Goal would be: Mobility    The Patient's Impairment, Limitation or Restriction Modifier goal would be best described as: CJ - 20% - 40% Impairment.         Goals: Reviewed 10/3/2017   Functional Short Term Goals (4 weeks):   Patient will walk with a normal gait pattern. Progressing  Patient will be able to sleep with no disruptions due to pain. Progressing  Patient will have improved lower extremity mobility to allow for improved dressing and self-cares with minimal to no pain. Progressing    Functional Long Term Goals (8 weeks):   Patient will self-manage symptoms and return to prior level of function.  Progressing  Patient will be independent in their Home Exercise Program.Progressing  Patient will tolerate walking with less than 2/10 pain up to 30 minutes. Progressing  Patient will complete stairs with minimal to no pain. Progressing  Patient will demonstrate improved strength to allow squatting and lifting activities with no difficulty completing work related tasks. Progressing  Stand on left foot without pain. Progressing      Patient participated in goal selection and understand(s) the plan of care: Yes   Patient Potential for  Achieving Desired Outcome: Good       Response to Intervention:  Good response to treatment. Patient verbalized understanding of HEP.       Plan    Treatment Plan:      Frequency:   12 visits    Duration of Treatment: 8 weeks    Planned Interventions:    Home Exercise Program development  Therapeutic Exercise (ROM & Strengthening)  Therapeutic Activities  Neuromuscular Re-education  Manual Therapy  Ultrasound  E-Stim  Gait Training  Hot Packs / Cold Pack Modalities  Vasopneumatic Compression with Cold Therapy ( Game Ready )  Phonophoresis with Ketoprofen  Iontophoresis with Dexamethasone      Plan for next visit:  Progress exercises as symptoms allow.  Manual therapy and modalities as indicated.

## 2017-12-27 NOTE — PROGRESS NOTES
"Patient Information     Patient Name MRN Matt Fontanez [id number] Male 1980      Progress Notes by Franklin Martinez, PT at 10/26/2017  1:50 PM     Author:  Franklin Martinez PT  Service:  (none) Author Type:  PT- Physical Therapist     Filed:  10/26/2017  3:57 PM  Date of Service:  10/26/2017  1:50 PM Status:  Addendum     :  Franklin Martinez PT (PT- Physical Therapist)        Related Notes: Original Note by Franklin Martinez, PT (PT- Physical Therapist) filed at 10/26/2017  3:56 PM            [facility]  Outpatient PT - Daily Note       Date of Service: 10/26/2017      Visit 2 of 12 authorized by Work Comp. ( An additional 12 visits was approved on 10/5/17)    G Codes updated 10/5/2017     Patient Name: Matt May   YOB: 1980   Referring MD/Provider: Dr. So  Medical and Treatment Diagnosis: Left ankle pain, unspecified chronicity   PT Treatment Diagnosis: pain, weakness, impaired ROM, impaired gait, impaired neuromuscular control  Insurance: Work Comp  Start of Service: 17  Certification Dates: 10/5/17 to 17              Subjective        Patient reports benefit of phonophoresis treatment last session.  He remains compliant with the HEP and current work restrictions.       Rates pain: 2/10 pain at rest.                  Objective        Patient localizes pain to the distal peroneal tendons.    Today's Intervention:      Phonophoresis x 10 minutes, 3 mhz, 1.0 w/cm2 to the distal peroneal tendons     Nustep x 6 minutes, seat 15, level 6--- legs only    Slant board stretch for left calf----pin at 3 on slant board    Floor to waist crate lift  x 10 with 10#, x 10 with 20#-- 2 sets    MedX HS curl, seat 9, 120# x 15, 2 sets  MedX Hip abduction 100# x 15, 2 sets  MedX knee extension, seat 9, 80# x 15, 2 sets     Step up on to 6\" step x 25     Instruction in ice massage to left peroneal tendons and lateral ankle region.  Recommended use of ice " "massage at home.       Home Exercise Program:    Cold pack and elevation for pain and inflammation control  Warm water for ROM exercises may be helpful  Flex and extend toes   Active PF/DF  Ankle circles x 20 each way   Ankle alphabet   Ankle PF with  band x 20  Ankle DF with band x 20  Towel scrunch   Ankle IV and EV AROM  Standing calf stretch 3 x 30\"   SLR x 15  Side hip abduction x 15  Ankle IV and EV with band  Weight shifting  Mini squat  Double calf raise          Assessment    Therapist Assessment / Clinical Impression: Patient is making gradual progress with ROM, strength, neuromuscular control, and gait.  Pain and swelling are gradually improving.  Patient requires additional PT treatment to progress the plan of care and maximize function.     Functional Impairment(s): See subjective on initial evaluation and Functional Assessment / Summary Report from [facility].    Physical Impairment(s):  pain, weakness, impaired ROM, impaired gait, impaired neuromuscular control      Patient Specific Functional and Pain Scales (PSFS): G Codes updated 10/5/2017   Clinician Instructions: Complete after the history and before the exam.    Initial Assessment: We want to know what 3 activities in your life you are unable to perform, or are having the most difficulty performing, as a result of your chief problem. Please list and score at least 3 activities that you are unable to perform, or having the most difficulty performing, because of your chief problem.   Patient Specific Activity Scoring Scheme (score one number for each activity):   Activity Score (0-10)  0= Unable to perform activity  10= Able to perform activity at same level as before injury or problem   1. Walking with full weight on left foot  6/10   2. Single leg stand on left foot 0/10   3. Squatting  5/10   4. Work  0/10   5.     Totals:  11/40 =27 % ability which relates to 73% impairment    Patient verbally states that they understand that the information they " have provided above is current and complete to the best of their knowledge.    Patient Specific Functional Scale Modifier Scale Conversion: (patient's modifier that correlates with pt's score on PSFS): CL (73% Impaired).    G codes and Modifier taken from pt completing a PSFS: completed at initial evaluation   Initial Primary G Code and Modifier:    Per the Patient's intake and/or assessment the Primary G Code is: Mobility .   The Patient's Impairment, Limitation or Restriction Modifier would be best described as: CN - 100% Impairment.     Goal Primary G Code and Modifier:    The Patient's G Code Goal would be: Mobility    The Patient's Impairment, Limitation or Restriction Modifier goal would be best described as: CJ - 20% - 40% Impairment.       G Codes updated 10/5/2017  Per the Patient's intake and/or assessment the Primary G Code is Mobility .  The Patient's Impairment, Limitation or Restriction Modifier would be best described as CL - 60% - 80% Impairment.     The Patient's G Code Goal would be Mobility   The Patient's Impairment, Limitation or Restriction Modifier would be best described as CJ - 20% - 40% Impairment.       Goals: Reviewed 10/5/2017   Functional Short Term Goals (4 weeks):   Patient will walk with a normal gait pattern. Progressing  Patient will be able to sleep with no disruptions due to pain. Progressing  Patient will have improved lower extremity mobility to allow for improved dressing and self-cares with minimal to no pain. Progressing    Functional Long Term Goals (8 weeks):   Patient will self-manage symptoms and return to prior level of function.  Progressing  Patient will be independent in their Home Exercise Program.Progressing  Patient will tolerate walking with less than 2/10 pain up to 30 minutes. Progressing  Patient will complete stairs with minimal to no pain. Progressing  Patient will demonstrate improved strength to allow squatting and lifting activities  with no difficulty completing work related tasks. Progressing  Stand on left foot without pain. Progressing      Patient participated in goal selection and understand(s) the plan of care: Yes   Patient Potential for Achieving Desired Outcome: Good       Response to Intervention:  Good response to treatment. Patient verbalized understanding of HEP.         Plan    Treatment Plan:      Frequency:   12 visits    Duration of Treatment: 8 weeks    Planned Interventions:    Home Exercise Program development  Therapeutic Exercise (ROM & Strengthening)  Therapeutic Activities  Neuromuscular Re-education  Manual Therapy  Ultrasound  E-Stim  Gait Training  Hot Packs / Cold Pack Modalities  Vasopneumatic Compression with Cold Therapy ( Game Ready )  Phonophoresis with Ketoprofen  Iontophoresis with Dexamethasone      Plan for next visit:  Progress exercises as symptoms allow.  Manual therapy and modalities as indicated.

## 2017-12-27 NOTE — PROGRESS NOTES
"Patient Information     Patient Name MRN Sex Matt Thao [id number] Male 1980      Progress Notes by Franklin Martinez, PT at 2017  1:42 PM     Author:  Franklin Martinez PT Service:  (none) Author Type:  PT- Physical Therapist     Filed:  2017  2:26 PM Date of Service:  2017  1:42 PM Status:  Signed     :  Franklin Martinez PT (PT- Physical Therapist)            [facility]  Outpatient PT - Daily Note     Date of Service: 2017     Visit 2 of 12 authorized by Work Comp.     Patient Name: Matt May   YOB: 1980   Referring MD/Provider: Dr. So  Medical and Treatment Diagnosis: Left ankle pain, unspecified chronicity   PT Treatment Diagnosis: pain, weakness, impaired ROM, impaired gait, impaired neuromuscular control  Insurance: Work Comp  Start of Service: 17  Certification Dates: Start of Service: 17  Medicare/MA Re-Cert Due: 10/12/17     Living Situations:  Independent in Living Situation      Preadmission Functional Mobility: Independent    Precautions:    Cognition:  Oriented to Person, Place, and Time.     Were cultural / age or other special adaptations needed? No      Patient is a vulnerable adult: No      Patient is aware of diagnosis: Yes      Risks and benefits explained: Yes          Subjective      Patient states the left foot and ankle is feeling better.  States ROM is improving in toes and ankle.       Rates pain: No pain at rest.  Pain increases to a 6/10 when he tries to put weight on it.   Describes pain: pins and needles, burning   Locates pain: heel, and posterior-lateral ankle/fibula.             Objective          Today's Intervention:      Exercises:  Flex and extend toes   Ankle PF/DF with towel assist for dorsiflexion   Active PF/DF  Left calf stretch with towel 3 x 30\"    Ankle PF with green band x 20  Ankle DF with red x 20  Ankle circles x 20 each way   Ankle alphabet   Towel scrunch      Weight " shifting onto left foot with crutches  Weight shifting into heel strike, stance and push off with left, using crutches for UE support.     Worked on walking with increased weight through the left foot with CAM boot and crutches     Game ready to left ankle x 15 minutes, low compression       Home Exercise Program:  Work on HEP as listed above 5 times a day  Discussed use of cold pack and elevation for pain and inflammation control  Warm water for ROM exercises may be helpful      Assessment    Therapist Assessment / Clinical Impression:  Signs and symptoms consistent with left lateral ankle sprain.  The patient is appropriate for physical therapy to address their pain, functional limitations, and physical impairments.     Functional Impairment(s): See subjective on initial evaluation and Functional Assessment / Summary Report from [facility].    Physical Impairment(s):  pain, weakness, impaired ROM, impaired gait, impaired neuromuscular control      Patient Specific Functional and Pain Scales (PSFS):    Clinician Instructions: Complete after the history and before the exam.    Initial Assessment: We want to know what 3 activities in your life you are unable to perform, or are having the most difficulty performing, as a result of your chief problem. Please list and score at least 3 activities that you are unable to perform, or having the most difficulty performing, because of your chief problem.   Patient Specific Activity Scoring Scheme (score one number for each activity):   Activity Score (0-10)  0= Unable to perform activity  10= Able to perform activity at same level as before injury or problem   1. Walking with full weight on left foot  0/10   2. Single leg stand on left foot 0/10   3. Squatting  0/10   4. Work  0/10   5.     Totals:  0/40 = 0 % ability which relates to 100% impairment    Patient verbally states that they understand that the information they have provided above is current and complete to the best of  their knowledge.    Patient Specific Functional Scale Modifier Scale Conversion: (patient's modifier that correlates with pt's score on PSFS): 0-CN (100% Impaired).    G codes and Modifier taken from pt completing a PSFS: completed at initial evaluation   Initial Primary G Code and Modifier:    Per the Patient's intake and/or assessment the Primary G Code is: Mobility .   The Patient's Impairment, Limitation or Restriction Modifier would be best described as: CN - 100% Impairment.     Goal Primary G Code and Modifier:    The Patient's G Code Goal would be: Mobility    The Patient's Impairment, Limitation or Restriction Modifier goal would be best described as: CJ - 20% - 40% Impairment.         Goals:  Functional Short Term Goals (4 weeks):   Patient will walk with a normal gait pattern.  Patient will be able to sleep with no disruptions due to pain.  Patient will have improved lower extremity mobility to allow for improved dressing and self-cares with minimal to no pain.    Functional Long Term Goals (8 weeks):   Patient will self-manage symptoms and return to prior level of function.    Patient will be independent in their Home Exercise Program.  Patient will tolerate walking with less than 2/10 pain up to 30 minutes.  Patient will complete stairs with minimal to no pain.  Patient will demonstrate improved strength to allow squatting and lifting activities with no difficulty completing work related tasks.  Stand on left foot without pain.       Patient participated in goal selection and understand(s) the plan of care: Yes   Patient Potential for Achieving Desired Outcome: Good       Response to Intervention:  Good response to treatment. Patient verbalized understanding of HEP.       Plan    Treatment Plan:      Frequency:   12 visits    Duration of Treatment: 8 weeks    Planned Interventions:    Home Exercise Program development  Therapeutic Exercise (ROM & Strengthening)  Therapeutic  Activities  Neuromuscular Re-education  Manual Therapy  Ultrasound  E-Stim  Gait Training  Hot Packs / Cold Pack Modalities  Vasopneumatic Compression with Cold Therapy ( Game Ready )  Phonophoresis with Ketoprofen  Iontophoresis with Dexamethasone      Plan for next visit:  Progress exercises as symptoms allow.  Manual therapy and modalities as indicated.

## 2017-12-28 NOTE — PROGRESS NOTES
"Patient Information     Patient Name MRN Sex Matt Thao [id number] Male 1980      Progress Notes by Franklin Martinez, PT at 2017  3:31 PM     Author:  Franklin Martinez PT Service:  (none) Author Type:  PT- Physical Therapist     Filed:  2017  4:08 PM Date of Service:  2017  3:31 PM Status:  Signed     :  Franklin Martinez PT (PT- Physical Therapist)            [facility]  Outpatient PT - Daily Note     Date of Service: 2017     Visit 3  authorized by Work Comp.     Patient Name: Matt May   YOB: 1980   Referring MD/Provider: Dr. So  Medical and Treatment Diagnosis: Left ankle pain, unspecified chronicity   PT Treatment Diagnosis: pain, weakness, impaired ROM, impaired gait, impaired neuromuscular control  Insurance: Work Comp  Start of Service: 17  Certification Dates: Start of Service: 17  Medicare/MA Re-Cert Due: 10/12/17     Living Situations:  Independent in Living Situation      Preadmission Functional Mobility: Independent    Precautions:    Cognition:  Oriented to Person, Place, and Time.     Were cultural / age or other special adaptations needed? No      Patient is a vulnerable adult: No      Patient is aware of diagnosis: Yes      Risks and benefits explained: Yes          Subjective      Patient states the left foot and ankle is feeling better.  States ROM is improving in toes and ankle.       Rates pain: 3/10 pain at rest.                  Objective          Today's Intervention:      Exercises:  Flex and extend toes   Active PF/DF  Ankle circles x 20 each way   Ankle alphabet   Left calf stretch with towel 3 x 30\"    Ankle PF with green band x 20  Ankle DF with red x 20    Towel scrunch    Weight shifting onto left foot with canes   Weight shifting into heel strike, stance and push off with left, using crutches for UE support.     Worked on walking with increased weight through the left foot with CAM " "boot and crutches     BAPS level 3--seated: forward/back, and circles     Game ready to left ankle x 15 minutes, low compression       Home Exercise Program:  Work on HEP as listed above 5 times a day  Discussed use of cold pack and elevation for pain and inflammation control  Warm water for ROM exercises may be helpful  Flex and extend toes   Active PF/DF  Ankle circles x 20 each way   Ankle alphabet   Left calf stretch with towel 3 x 30\"  Ankle PF with green band x 20  Ankle DF with red x 20  Towel scrunch  Weight shifting onto left foot with canes           Assessment    Therapist Assessment / Clinical Impression:  Signs and symptoms consistent with left lateral ankle sprain.  The patient is appropriate for physical therapy to address their pain, functional limitations, and physical impairments.     Functional Impairment(s): See subjective on initial evaluation and Functional Assessment / Summary Report from [facility].    Physical Impairment(s):  pain, weakness, impaired ROM, impaired gait, impaired neuromuscular control      Patient Specific Functional and Pain Scales (PSFS):    Clinician Instructions: Complete after the history and before the exam.    Initial Assessment: We want to know what 3 activities in your life you are unable to perform, or are having the most difficulty performing, as a result of your chief problem. Please list and score at least 3 activities that you are unable to perform, or having the most difficulty performing, because of your chief problem.   Patient Specific Activity Scoring Scheme (score one number for each activity):   Activity Score (0-10)  0= Unable to perform activity  10= Able to perform activity at same level as before injury or problem   1. Walking with full weight on left foot  0/10   2. Single leg stand on left foot 0/10   3. Squatting  0/10   4. Work  0/10   5.     Totals:  0/40 = 0 % ability which relates to 100% impairment    Patient verbally states that they understand " that the information they have provided above is current and complete to the best of their knowledge.    Patient Specific Functional Scale Modifier Scale Conversion: (patient's modifier that correlates with pt's score on PSFS): 0-CN (100% Impaired).    G codes and Modifier taken from pt completing a PSFS: completed at initial evaluation   Initial Primary G Code and Modifier:    Per the Patient's intake and/or assessment the Primary G Code is: Mobility .   The Patient's Impairment, Limitation or Restriction Modifier would be best described as: CN - 100% Impairment.     Goal Primary G Code and Modifier:    The Patient's G Code Goal would be: Mobility    The Patient's Impairment, Limitation or Restriction Modifier goal would be best described as: CJ - 20% - 40% Impairment.         Goals:  Functional Short Term Goals (4 weeks):   Patient will walk with a normal gait pattern.  Patient will be able to sleep with no disruptions due to pain.  Patient will have improved lower extremity mobility to allow for improved dressing and self-cares with minimal to no pain.    Functional Long Term Goals (8 weeks):   Patient will self-manage symptoms and return to prior level of function.    Patient will be independent in their Home Exercise Program.  Patient will tolerate walking with less than 2/10 pain up to 30 minutes.  Patient will complete stairs with minimal to no pain.  Patient will demonstrate improved strength to allow squatting and lifting activities with no difficulty completing work related tasks.  Stand on left foot without pain.       Patient participated in goal selection and understand(s) the plan of care: Yes   Patient Potential for Achieving Desired Outcome: Good       Response to Intervention:  Good response to treatment. Patient verbalized understanding of HEP.       Plan    Treatment Plan:      Frequency:   12 visits    Duration of Treatment: 8 weeks    Planned Interventions:    Home Exercise Program  development  Therapeutic Exercise (ROM & Strengthening)  Therapeutic Activities  Neuromuscular Re-education  Manual Therapy  Ultrasound  E-Stim  Gait Training  Hot Packs / Cold Pack Modalities  Vasopneumatic Compression with Cold Therapy ( Game Ready )  Phonophoresis with Ketoprofen  Iontophoresis with Dexamethasone      Plan for next visit:  Progress exercises as symptoms allow.  Manual therapy and modalities as indicated.

## 2017-12-28 NOTE — PROGRESS NOTES
Patient Information     Patient Name MRN Sex Matt Thao [id number] Male 1980      Progress Notes by Dontrell So DO at 10/18/2017  1:15 PM     Author:  Dontrell So DO Service:  (none) Author Type:  PHYS- Osteopathic     Filed:  10/18/2017  4:18 PM Encounter Date:  10/18/2017 Status:  Signed     :  Dontrell So DO (PHYS- Osteopathic)            PROGRESS NOTE    SUBJECTIVE:  Matt May is here for recheck of a  left ankle and foot.    HPI: 37-year-old male presents with work comp representative for recheck of his left ankle. The patient injured the ankle about 4 months ago around 2017. .  Initial x-rays were negative for fracture and the patient had a CAT scan performed on 17. Impression included probable ligamentous injury. Patient was immobilized and has been engaged with physical therapy for his recovery process.   The patient relates he continues to have significant pain to the left ankle region, mostly the lateral aspect. He presents with a single cane. He has not been back to work activities. Normally engaged in heavy activity work. Describes increased pain when moving the ankle up and down or side to side or getting it twisted.     Review of Systems:  Constitutional: Denies constitutional problems    PFSH:  No change in information. See earlier PFSH questionnaire completed by the patient on initial visit.    OBJECTIVE:  /88  Pulse 88  Ht 1.829 m (6')  Wt 136.1 kg (300 lb)  BMI 40.69 kg/m2 Body mass index is 40.69 kg/(m^2).  Patient is alert and orientated x3 and answers questions appropriately.    Exam of the ankle and foot:  Left ankle exam demonstrates mild soft tissue swelling mostly on the lateral ankle complex.  Patient is still quite tender and painful over the lateral ligamentous complex including the anterior tibiofibular, talofibular and calcaneofibular ligaments. Stressing the ankle also increases pain. Passive dorsiflexion and volar  flexion of the ankle joint demonstrates restricted plantar flexion and associated pain along the lateral ankle.    Radiographic images where independently reviewed and discussed with the patient.   Attending Doctor: SANIYA KRAFT ([id number])  :       DAISY STONE ([id number])  Report Date:       06/25/2017 17:02:51  Report Status:       Final  ======================= Begin of Report Content ======================    EXAM:CT ANKLE LEFT WO  REASON FOR EXAM:ANKLE PAIN/PROBLEM;  CLINICAL HISTORY: Patient Age 36 years  COMPARISON: Conventional radiograph 06/23/2017  TECHNIQUE: Axial images acquired without contrast with coronal and sagittal two-dimensional reformatted images  CONTRAST: None  FINDINGS: No ankle fracture is identified. There is marked diffuse subcutaneous edema about the left ankle. Focal hematoma adjacent to the lateral malleolus measuring approximately 3.5 x 1.5 x 4.1 cm in craniocaudad, transverse, and AP dimension. Small ankle effusion, including fluid in the anterolateral ankle recess. The anterior talofibular ligament is visualized and is likely torn. This would be better evaluated with MRI. Ankle tendons appear grossly normal. No appreciable degenerative arthritis.    IMPRESSION:  1. Probable high-grade or full-thickness tearing anterior talofibular ligament.  2. Diffuse soft tissue edema about the left ankle with focal hematoma adjacent to the lateral malleolus.  3. Small ankle effusion.  4. No fracture identified.  Electronically Signed By: Daisy Stone M.D. on 6/25/2017 4:58 PM    Attending Doctor: ZACHARIAH WILLIS ([id number])  :       FLACO MICHEL ([id number])  Report Date:       06/23/2017 10:18:11  Report Status:       Final  ======================= Begin of Report Content ======================    PROCEDURE: XR ANKLE 3 VIEWS LEFT  HISTORY: ANKLE INJURY; .  COMPARISON: 01/11/2012  TECHNIQUE: 3 views of the left ankle were obtained.  FINDINGS: No fracture or dislocation is  identified. The joint spaces are preserved. There is marked soft tissue swelling laterally.  IMPRESSION: No acute fracture.  Electronically Signed By: Morena Blair M.D. on 6/23/2017 10:13 AM    ASSESSMENT:  Right lateral ankle sprain with probable ligamentous tear  history of work-related injury about 4 months ago on 6/23/17   patient has been slow to improve over the last months. He has been engaged in therapy but continues to have significant pain complaints.    PLAN:  Recommendation would include primarily light duty work activities at this point. No heavy lifting or carrying, no ladders or stairs.   Since the patient is having continued, chronic pain to the ankle I would consideration for work hardening program but also consider MRI of the ankle to further evaluate the soft tissues better.   In addition, evaluation by a foot and ankle specialist at this point would be reasonable in my opinion.   The patient's work comp representative will check with his work place to see if he can go back to some light duty at this point. When he calls back I will discuss further these other recommendations.     Dontrell So D.O.  Orthopedic Surgeon    [place] [place] [address]  Phone [phone]  Fax [phone]    4:09 PM 10/18/2017

## 2017-12-28 NOTE — PROGRESS NOTES
Patient Information     Patient Name MRN Sex Matt Thao [id number] Male 1980      Progress Notes by Dontrell So DO at 2017  8:30 AM     Author:  Dontrell So DO Service:  (none) Author Type:  PHYS- Osteopathic     Filed:  2017  9:46 AM Encounter Date:  2017 Status:  Signed     :  Dontrell So DO (PHYS- Osteopathic)            PROGRESS NOTE    SUBJECTIVE:  Matt May is here for recheck of a  left ankle and foot.    HPI: History of a left ankle sprain treated with cast immobilization. The patient presents with a scooter walker. Relates the pain is minimal at this time. Significantly improved over the past several weeks. Pain rating is 1-2 out of 10. He has been keeping weight off the foot. Regular work activities require standing and walking. .    Review of Systems:  Constitutional: Denies constitutional problems    PFSH:  No change in information. See earlier PFSH questionnaire completed by the patient on initial visit.    OBJECTIVE:  /90  Pulse 88  Wt 136.1 kg (300 lb)  BMI 40.69 kg/m2 Body mass index is 40.69 kg/(m^2).  Patient is alert and orientated x3 and answers questions appropriately.    Exam of the ankle and foot:  Examination after cast removal demonstrates mild-to-moderate swelling of the left ankle. Limited active and passive ankle motion. Dorsiflexion about neutral. Plantar flexion about 20 .    ASSESSMENT:  Approximately 6 weeks status post left ankle sprain. Work-related injury around 17.  Treated with cast immobilization.  Overall improving.    PLAN:  Cast removal today.  Recommend a cam walker and gradually progressing weightbearing on the foot.  Recommend formal physical therapy for evaluation and treatment and instruction.  Paperwork filled out. Sitdown work if available. Occasional standing. No heavy lifting or carrying.  Gradually progress activities during the next 1 month. Okay to ice and elevate.  Recheck in 1 month. Questions  answered.    Dontrell So D.O.  Orthopedic Surgeon    [facility]  [address] [place] [address]  Phone [phone]  Fax [phone]    8:51 AM 8/8/2017

## 2017-12-28 NOTE — PROGRESS NOTES
Patient Information     Patient Name MRN Sex Matt Thao [id number] Male 1980      Progress Notes by Yamileth Rock at 2017  3:04 PM     Author:  Yamileth Rock Service:  (none) Author Type:  Other Clinical Staff     Filed:  2017  3:14 PM Date of Service:  2017  3:04 PM Status:  Signed     :  Yamileth Rock (Other Clinical Staff)            Falls Risk Criteria:    Age 65 and older or under age 4        Sensory deficits    Poor vision    Use of ambulatory aides    Impaired judgment    Unable to walk independently    Meets High Risk criteria for falls:  Yes             1.  Do you have dizziness or vertigo?    no                    2.  Do you need help standing or walking?   yes                 3.  Have you fallen within the last 6 months?    no           4.  Has the patient been fasting?      no       If any risks are marked Yes, the following interventions are utilized:    Do not leave patient unattended     Assist patient in the dressing room and bathroom    Have ambulatory aides available throughout procedure    Involve patient s family if available

## 2017-12-28 NOTE — PROGRESS NOTES
"Patient Information     Patient Name MRN Sex Matt Thao [id number] Male 1980      Progress Notes by Franklin Martinez, PT at 10/23/2017  2:08 PM     Author:  Franklin Martinez, PT Service:  (none) Author Type:  PT- Physical Therapist     Filed:  10/23/2017  3:06 PM Date of Service:  10/23/2017  2:08 PM Status:  Signed     :  Franklin Martinez PT (PT- Physical Therapist)            [facility]  Outpatient PT - Daily Note       Date of Service: 10/23/2017      Visit 1of 12 authorized by Work Comp. ( An additional 12 visits was approved on 10/5/17)    G Codes updated 10/5/2017     Patient Name: Matt May   YOB: 1980   Referring MD/Provider: Dr. So  Medical and Treatment Diagnosis: Left ankle pain, unspecified chronicity   PT Treatment Diagnosis: pain, weakness, impaired ROM, impaired gait, impaired neuromuscular control  Insurance: Work Comp  Start of Service: 17  Certification Dates: 10/5/17 to 17              Subjective        Patient reports the left ankle remains painful from increasing activity.  He continues to use a single point cane for mobility.  He wears the lace ankle brace when walking outdoors.  He can walk in his home without the brace.  He remains compliant with the HEP and current work restrictions.       Rates pain: 2/10 pain at rest.                  Objective            Today's Intervention:      Nustep x 8 minutes, seat 15, arms 10, level 4     Slant board stretch for left calf----pin at 3 on slant board    Exercises:  Weight shifting----fwd,back, side/side  Marching----UE support required due to left   Single leg stand with UE support, hold 5\"   Double calf raise x 20    Floor to waist crate lift  x 10 with 10#,  2 sets    Phonophoresis x 10 minutes, 3 mhz, 1.0 w/cm2 to the ATF, CF ligaments and soft tissue posterior to the distal fibula      Home Exercise Program:    Cold pack and elevation for pain and inflammation " "control  Warm water for ROM exercises may be helpful  Flex and extend toes   Active PF/DF  Ankle circles x 20 each way   Ankle alphabet   Ankle PF with  band x 20  Ankle DF with band x 20  Towel scrunch   Ankle IV and EV AROM  Standing calf stretch 3 x 30\"   SLR x 15  Side hip abduction x 15  Ankle IV and EV with band  Weight shifting  Mini squat  Double calf raise          Assessment    Therapist Assessment / Clinical Impression: Patient is making gradual progress with ROM, strength, neuromuscular control, and gait.  Pain and swelling are gradually improving.  Patient requires additional PT treatment to progress the plan of care and maximize function.     Functional Impairment(s): See subjective on initial evaluation and Functional Assessment / Summary Report from [facility].    Physical Impairment(s):  pain, weakness, impaired ROM, impaired gait, impaired neuromuscular control      Patient Specific Functional and Pain Scales (PSFS): G Codes updated 10/5/2017   Clinician Instructions: Complete after the history and before the exam.    Initial Assessment: We want to know what 3 activities in your life you are unable to perform, or are having the most difficulty performing, as a result of your chief problem. Please list and score at least 3 activities that you are unable to perform, or having the most difficulty performing, because of your chief problem.   Patient Specific Activity Scoring Scheme (score one number for each activity):   Activity Score (0-10)  0= Unable to perform activity  10= Able to perform activity at same level as before injury or problem   1. Walking with full weight on left foot  6/10   2. Single leg stand on left foot 0/10   3. Squatting  5/10   4. Work  0/10   5.     Totals:  11/40 =27 % ability which relates to 73% impairment    Patient verbally states that they understand that the information they have provided above is current and complete to the best of their knowledge.    Patient Specific " Functional Scale Modifier Scale Conversion: (patient's modifier that correlates with pt's score on PSFS): CL (73% Impaired).    G codes and Modifier taken from pt completing a PSFS: completed at initial evaluation   Initial Primary G Code and Modifier:    Per the Patient's intake and/or assessment the Primary G Code is: Mobility .   The Patient's Impairment, Limitation or Restriction Modifier would be best described as: CN - 100% Impairment.     Goal Primary G Code and Modifier:    The Patient's G Code Goal would be: Mobility    The Patient's Impairment, Limitation or Restriction Modifier goal would be best described as: CJ - 20% - 40% Impairment.       G Codes updated 10/5/2017  Per the Patient's intake and/or assessment the Primary G Code is Mobility .  The Patient's Impairment, Limitation or Restriction Modifier would be best described as CL - 60% - 80% Impairment.     The Patient's G Code Goal would be Mobility   The Patient's Impairment, Limitation or Restriction Modifier would be best described as CJ - 20% - 40% Impairment.       Goals: Reviewed 10/5/2017   Functional Short Term Goals (4 weeks):   Patient will walk with a normal gait pattern. Progressing  Patient will be able to sleep with no disruptions due to pain. Progressing  Patient will have improved lower extremity mobility to allow for improved dressing and self-cares with minimal to no pain. Progressing    Functional Long Term Goals (8 weeks):   Patient will self-manage symptoms and return to prior level of function.  Progressing  Patient will be independent in their Home Exercise Program.Progressing  Patient will tolerate walking with less than 2/10 pain up to 30 minutes. Progressing  Patient will complete stairs with minimal to no pain. Progressing  Patient will demonstrate improved strength to allow squatting and lifting activities with no difficulty completing work related tasks. Progressing  Stand on left foot without pain.  Progressing      Patient participated in goal selection and understand(s) the plan of care: Yes   Patient Potential for Achieving Desired Outcome: Good       Response to Intervention:  Good response to treatment. Patient verbalized understanding of HEP.         Plan    Treatment Plan:      Frequency:   12 visits    Duration of Treatment: 8 weeks    Planned Interventions:    Home Exercise Program development  Therapeutic Exercise (ROM & Strengthening)  Therapeutic Activities  Neuromuscular Re-education  Manual Therapy  Ultrasound  E-Stim  Gait Training  Hot Packs / Cold Pack Modalities  Vasopneumatic Compression with Cold Therapy ( Game Ready )  Phonophoresis with Ketoprofen  Iontophoresis with Dexamethasone      Plan for next visit:  Progress exercises as symptoms allow.  Manual therapy and modalities as indicated.

## 2017-12-28 NOTE — PROGRESS NOTES
"Patient Information     Patient Name MRN Sex Matt Thao [id number] Male 1980      Progress Notes by Franklin Martinez, PT at 2017  2:55 PM     Author:  Franklin Martinez, PT Service:  (none) Author Type:  PT- Physical Therapist     Filed:  2017  5:15 PM Date of Service:  2017  2:55 PM Status:  Signed     :  Franklin Martinez PT (PT- Physical Therapist)            [facility]  Outpatient PT - Daily Note     Date of Service: 2017     Visit 4  12 authorized by Work Comp.     Patient Name: Matt May   YOB: 1980   Referring MD/Provider: Dr. So  Medical and Treatment Diagnosis: Left ankle pain, unspecified chronicity   PT Treatment Diagnosis: pain, weakness, impaired ROM, impaired gait, impaired neuromuscular control  Insurance: Work Comp  Start of Service: 17  Certification Dates: Start of Service: 17  Medicare/MA Re-Cert Due: 10/12/17               Subjective      Patient states has been walking with the CAM boot an no device without difficulty. States the left ankle is sore, but ROM has been improving .       Rates pain: 3/10 pain at rest.                  Objective          Today's Intervention:      Exercises:  Flex and extend toes   Active PF/DF  Ankle circles x 20 each way   Ankle alphabet   Left calf stretch with towel 3 x 30\"----Dorsiflexion to neutral    Ankle PF with black band x 20  Ankle DF with red x 20    Ankle IV and EV AROM    Towel scrunch    BAPS level 3--seated: forward/back, and circles     Weight shifting onto left foot    Weight shifting into heel strike, stance and push off with left, using crutches for UE support.      Worked on walking with increased weight through the left foot with CAM boot and crutches       Game ready to left ankle x 15 minutes, low compression       Home Exercise Program:  Work on HEP as listed above 5 times a day  Discussed use of cold pack and elevation for pain and inflammation " "control  Warm water for ROM exercises may be helpful  Flex and extend toes   Active PF/DF  Ankle circles x 20 each way   Ankle alphabet   Left calf stretch with towel 3 x 30\"  Ankle PF with green band x 20  Ankle DF with red x 20  Towel scrunch  Weight shifting onto left foot with canes   Ankle IV and EV AROM        Assessment    Therapist Assessment / Clinical Impression:  Signs and symptoms consistent with left lateral ankle sprain.  The patient is appropriate for physical therapy to address their pain, functional limitations, and physical impairments.     Functional Impairment(s): See subjective on initial evaluation and Functional Assessment / Summary Report from TO.    Physical Impairment(s):  pain, weakness, impaired ROM, impaired gait, impaired neuromuscular control      Patient Specific Functional and Pain Scales (PSFS):    Clinician Instructions: Complete after the history and before the exam.    Initial Assessment: We want to know what 3 activities in your life you are unable to perform, or are having the most difficulty performing, as a result of your chief problem. Please list and score at least 3 activities that you are unable to perform, or having the most difficulty performing, because of your chief problem.   Patient Specific Activity Scoring Scheme (score one number for each activity):   Activity Score (0-10)  0= Unable to perform activity  10= Able to perform activity at same level as before injury or problem   1. Walking with full weight on left foot  0/10   2. Single leg stand on left foot 0/10   3. Squatting  0/10   4. Work  0/10   5.     Totals:  0/40 = 0 % ability which relates to 100% impairment    Patient verbally states that they understand that the information they have provided above is current and complete to the best of their knowledge.    Patient Specific Functional Scale Modifier Scale Conversion: (patient's modifier that correlates with pt's score on PSFS): 0-CN (100% " Impaired).    G codes and Modifier taken from pt completing a PSFS: completed at initial evaluation   Initial Primary G Code and Modifier:    Per the Patient's intake and/or assessment the Primary G Code is: Mobility .   The Patient's Impairment, Limitation or Restriction Modifier would be best described as: CN - 100% Impairment.     Goal Primary G Code and Modifier:    The Patient's G Code Goal would be: Mobility    The Patient's Impairment, Limitation or Restriction Modifier goal would be best described as: CJ - 20% - 40% Impairment.         Goals:  Functional Short Term Goals (4 weeks):   Patient will walk with a normal gait pattern.  Patient will be able to sleep with no disruptions due to pain.  Patient will have improved lower extremity mobility to allow for improved dressing and self-cares with minimal to no pain.    Functional Long Term Goals (8 weeks):   Patient will self-manage symptoms and return to prior level of function.    Patient will be independent in their Home Exercise Program.  Patient will tolerate walking with less than 2/10 pain up to 30 minutes.  Patient will complete stairs with minimal to no pain.  Patient will demonstrate improved strength to allow squatting and lifting activities with no difficulty completing work related tasks.  Stand on left foot without pain.       Patient participated in goal selection and understand(s) the plan of care: Yes   Patient Potential for Achieving Desired Outcome: Good       Response to Intervention:  Good response to treatment. Patient verbalized understanding of HEP.       Plan    Treatment Plan:      Frequency:   12 visits    Duration of Treatment: 8 weeks    Planned Interventions:    Home Exercise Program development  Therapeutic Exercise (ROM & Strengthening)  Therapeutic Activities  Neuromuscular Re-education  Manual Therapy  Ultrasound  E-Stim  Gait Training  Hot Packs / Cold Pack Modalities  Vasopneumatic Compression with Cold Therapy (  Game Ready )  Phonophoresis with Ketoprofen  Iontophoresis with Dexamethasone      Plan for next visit:  Progress exercises as symptoms allow.  Manual therapy and modalities as indicated.

## 2017-12-28 NOTE — PROGRESS NOTES
Patient Information     Patient Name MRN Sex Matt Thao [id number] Male 1980      Progress Notes by Franklin Martinez PT at 10/5/2017  3:25 PM     Author:  Franklin Martinez PT Service:  (none) Author Type:  PT- Physical Therapist     Filed:  10/5/2017  4:07 PM Date of Service:  10/5/2017  3:25 PM Status:  Signed     :  Franklin Martinez PT (PT- Physical Therapist)            [facility]  Outpatient PT - Recertification Note     Date of Service: 10/5/2017      Visit 10 of 12 authorized by Work Comp.     G Codes updated 10/5/2017     Patient Name: Matt May   YOB: 1980   Referring MD/Provider: Dr. So  Medical and Treatment Diagnosis: Left ankle pain, unspecified chronicity   PT Treatment Diagnosis: pain, weakness, impaired ROM, impaired gait, impaired neuromuscular control  Insurance: Work Comp  Start of Service: 17  Certification Dates: 10/5/17 to 17              Subjective        Patient states the lateral ankle is sore today.  He remains compliant with the HEP and current work restrictions.     Rates pain: 2-3/10 pain at rest.                  Objective          Ankle Range of Motion   ACTIVE  PASSIVE      Right Left Right Left Normal   Dorsiflexion  0   20   Plantarflexion  40   50   Inversion  18   35   Eversion  7   15       NOTES: ankle dorsiflexion limited by pain during closed chain motion during gait.       Today's Intervention:      Nustep x 5 minutes, seat 15, arms 10, level 4     Slant board stretch for left calf----pin at 3 on slant board    Exercises:  Weight shifting----fwd,back, side/side  Mini squat x 20  Double calf raise x 20    Grade 3 talocrural mobs   Grade 3 distraction of talocrural joint  Manual achilles stretch with patient in long sitting    Patient will ice at home today.       Home Exercise Program:    Discussed use of cold pack and elevation for pain and inflammation control  Warm water for ROM exercises may be  "helpful  Flex and extend toes   Active PF/DF  Ankle circles x 20 each way   Ankle alphabet   Left calf stretch with towel 3 x 30\"  Ankle PF with green band x 20  Ankle DF with red x 20  Towel scrunch  Weight shifting onto left foot with canes   Ankle IV and EV AROM  Standing calf stretch 3 x 30\"   SLR x 15  Side hip abduction x 15  Ankle IV and EV with band  Weight shifting  Mini squat  Double calf raise          Assessment    Therapist Assessment / Clinical Impression: Patient is making gradual progress with ROM, strength, neuromuscular control, and gait.  Pain and swelling are gradually improving.  Patient requires additional PT treatment to progress the plan of care and maximize function.     Functional Impairment(s): See subjective on initial evaluation and Functional Assessment / Summary Report from TO.    Physical Impairment(s):  pain, weakness, impaired ROM, impaired gait, impaired neuromuscular control      Patient Specific Functional and Pain Scales (PSFS): G Codes updated 10/5/2017   Clinician Instructions: Complete after the history and before the exam.    Initial Assessment: We want to know what 3 activities in your life you are unable to perform, or are having the most difficulty performing, as a result of your chief problem. Please list and score at least 3 activities that you are unable to perform, or having the most difficulty performing, because of your chief problem.   Patient Specific Activity Scoring Scheme (score one number for each activity):   Activity Score (0-10)  0= Unable to perform activity  10= Able to perform activity at same level as before injury or problem   1. Walking with full weight on left foot  6/10   2. Single leg stand on left foot 0/10   3. Squatting  5/10   4. Work  0/10   5.     Totals:  11/40 =27 % ability which relates to 73% impairment    Patient verbally states that they understand that the information they have provided above is current and complete to the best of " their knowledge.    Patient Specific Functional Scale Modifier Scale Conversion: (patient's modifier that correlates with pt's score on PSFS): CL (73% Impaired).    G codes and Modifier taken from pt completing a PSFS: completed at initial evaluation   Initial Primary G Code and Modifier:    Per the Patient's intake and/or assessment the Primary G Code is: Mobility .   The Patient's Impairment, Limitation or Restriction Modifier would be best described as: CN - 100% Impairment.     Goal Primary G Code and Modifier:    The Patient's G Code Goal would be: Mobility    The Patient's Impairment, Limitation or Restriction Modifier goal would be best described as: CJ - 20% - 40% Impairment.       G Codes updated 10/5/2017  Per the Patient's intake and/or assessment the Primary G Code is Mobility .  The Patient's Impairment, Limitation or Restriction Modifier would be best described as CL - 60% - 80% Impairment.     The Patient's G Code Goal would be Mobility   The Patient's Impairment, Limitation or Restriction Modifier would be best described as CJ - 20% - 40% Impairment.       Goals: Reviewed 10/5/2017   Functional Short Term Goals (4 weeks):   Patient will walk with a normal gait pattern. Progressing  Patient will be able to sleep with no disruptions due to pain. Progressing  Patient will have improved lower extremity mobility to allow for improved dressing and self-cares with minimal to no pain. Progressing    Functional Long Term Goals (8 weeks):   Patient will self-manage symptoms and return to prior level of function.  Progressing  Patient will be independent in their Home Exercise Program.Progressing  Patient will tolerate walking with less than 2/10 pain up to 30 minutes. Progressing  Patient will complete stairs with minimal to no pain. Progressing  Patient will demonstrate improved strength to allow squatting and lifting activities with no difficulty completing work related tasks.  Progressing  Stand on left foot without pain. Progressing      Patient participated in goal selection and understand(s) the plan of care: Yes   Patient Potential for Achieving Desired Outcome: Good       Response to Intervention:  Good response to treatment. Patient verbalized understanding of HEP.       Plan    Treatment Plan:      Frequency:   12 visits    Duration of Treatment: 8 weeks    Planned Interventions:    Home Exercise Program development  Therapeutic Exercise (ROM & Strengthening)  Therapeutic Activities  Neuromuscular Re-education  Manual Therapy  Ultrasound  E-Stim  Gait Training  Hot Packs / Cold Pack Modalities  Vasopneumatic Compression with Cold Therapy ( Game Ready )  Phonophoresis with Ketoprofen  Iontophoresis with Dexamethasone      Plan for next visit:  Progress exercises as symptoms allow.  Manual therapy and modalities as indicated.       Thank you for your referral to [facility].  Please call with any questions, concerns or comments.  [phone]     The signature, of the referring medical provider, on this document indicates certification of the above prescribed plan of care and is medically necessary.

## 2017-12-28 NOTE — PROGRESS NOTES
Patient Information     Patient Name MRN Sex Matt Thao [id number] Male 1980      Progress Notes by Dontrell So DO at 11/15/2017 11:15 AM     Author:  Dontrell So DO Service:  (none) Author Type:  PHYS- Osteopathic     Filed:  11/15/2017 12:07 PM Encounter Date:  11/15/2017 Status:  Signed     :  Dontrell So DO (PHYS- Osteopathic)            PROGRESS NOTE    SUBJECTIVE:  Matt May is here for recheck of a  left ankle and foot.    HPI: Patient presents with his [facility] representative. The patient had a left ankle injury about 5 months ago near the end of . Initial studies were negative for fracture. CAT scan demonstrated ligamentous disruption on the lateral ankle complex.   The patient has been slow to recover over the past months. Still intense pain described to the lateral ankle complex and medial side of the ankle. Presents with a lace up ankle support and using a cane. Seating physical therapy for the ankle. There has been some improvement but the patient describes still having rather intense pain and occasionally notices a clunking sensation. Relates continued swelling of the ankle. Prior work restrictions. The patient has been off work since his injury. .    Review of Systems:  Constitutional: Denies constitutional problems    PFSH:  No change in information. See earlier PFSH questionnaire completed by the patient on initial visit.    OBJECTIVE:  /90  Pulse 80  Ht 1.829 m (6')  Wt 136.1 kg (300 lb)  BMI 40.69 kg/m2 Body mass index is 40.69 kg/(m^2).  Patient is alert and orientated x3 and answers questions appropriately.    Exam of the ankle and foot:  Left ankle demonstrates mild generalized swelling but no bruising. He has pain along the lateral ankle complex including the peroneal tendons with palpation and this extends at least a hand breath proximal to the tip of the malleolus. Pain along the ligamentous complex with palpation of the anterior  tibial-fibular and talofibular ligaments. Increased pain when stressing the ankle.    also has pain along the medial malleolus and medial deltoid with palpation and along the posterior aspect where the tendons are located.  No anterior or posterior instability appreciated with drawer test.  Describes significant increase in pain with supination and pronation.  Increased lateral ankle pain with resistance against pressure. while internally or externally rotating the foot.    ASSESSMENT:  37-year-old male with history of left ankle injury about 5 months ago. Suspected lateral ankle ligamentous tear    the patient has been slow to recover over the past months. Still very painful on examination and by history.    therapy seems to be helpful but still having problems with walking and pain management.    PLAN:  Recommendation discussed with the patient and [facility] today is for a MRI of the left ankle to assess the ligaments and bone.  Given the patient's duration of symptoms and limited improvement over the past months and would recommend the study.  Continue with the ankle support.  Continue with exercises and therapy.  Continue with prior work restrictions/recommendations at this time.  Anticipate a follow-up about a week after MRI.  Possibility of referral to an ankle specialist discussed.    ranjana So D.O.  Orthopedic Surgeon    [address] [place] [address]  Phone [phone]  Fax [phone]    12:02 PM 11/15/2017

## 2017-12-28 NOTE — PROGRESS NOTES
Patient Information     Patient Name MRN Sex Matt Thao [id number] Male 1980      Progress Notes by Franklin Martinez, PT at 2017  2:11 PM     Author:  Franklin Martinez PT Service:  (none) Author Type:  PT- Physical Therapist     Filed:  2017  3:02 PM Date of Service:  2017  2:11 PM Status:  Signed     :  Franklin Martinez PT (PT- Physical Therapist)            [facility]  Outpatient PT - Daily Note       Date of Service: 2017       Visit 5 of 12 authorized by Work Comp. ( An additional 12 visits was approved on 10/5/17)    G Codes updated 10/5/2017     Patient Name: Matt May   YOB: 1980   Referring MD/Provider: Dr. So  Medical and Treatment Diagnosis: Left ankle pain, unspecified chronicity   PT Treatment Diagnosis: pain, weakness, impaired ROM, impaired gait, impaired neuromuscular control  Insurance: Work Comp  Start of Service: 17  Certification Dates: 10/5/17 to 17              Subjective        Patient reports the medial and lateral ankle remain painful.  He has a follow up with Dr. So on 11/15/17       Rates pain: 2/10 pain at rest.                  Objective        Patient localizes pain to the distal peroneal tendons.    Today's Intervention:      Phonophoresis x 10 minutes, 3 mhz, 1.0 w/cm2 to the distal peroneal tendons     Nustep x 6 minutes, seat 15, level 6--- legs only    Slant board stretch for left calf----pin at 3 on slant board    Floor to waist crate lift x 10 with 20#, x 10 with 30#     Push sled 50 feet x 3 with 25 #, pull back 50 feet x 1     MedX HS curl, seat 9, 120# x 15, 2 sets  MedX Hip abduction 100# x 30, 2 sets  MedX knee extension, seat 9, 80# x 15, 2 sets       Patient will ice at home today.       Home Exercise Program:    Cold pack and elevation for pain and inflammation control  Warm water for ROM exercises may be helpful  Flex and extend toes   Active PF/DF  Ankle circles x 20 each  "way   Ankle alphabet   Ankle PF with  band x 20  Ankle DF with band x 20  Towel scrunch   Ankle IV and EV AROM  Standing calf stretch 3 x 30\"   SLR x 15  Side hip abduction x 15  Ankle IV and EV with band  Weight shifting  Mini squat  Double calf raise          Assessment    Therapist Assessment / Clinical Impression: Patient is making gradual progress with ROM, strength, neuromuscular control, and gait.  Pain and swelling are gradually improving.  Patient requires additional PT treatment to progress the plan of care and maximize function.     Functional Impairment(s): See subjective on initial evaluation and Functional Assessment / Summary Report from [facility].    Physical Impairment(s):  pain, weakness, impaired ROM, impaired gait, impaired neuromuscular control      Patient Specific Functional and Pain Scales (PSFS): G Codes updated 10/5/2017   Clinician Instructions: Complete after the history and before the exam.    Initial Assessment: We want to know what 3 activities in your life you are unable to perform, or are having the most difficulty performing, as a result of your chief problem. Please list and score at least 3 activities that you are unable to perform, or having the most difficulty performing, because of your chief problem.   Patient Specific Activity Scoring Scheme (score one number for each activity):   Activity Score (0-10)  0= Unable to perform activity  10= Able to perform activity at same level as before injury or problem   1. Walking with full weight on left foot  6/10   2. Single leg stand on left foot 0/10   3. Squatting  5/10   4. Work  0/10   5.     Totals:  11/40 =27 % ability which relates to 73% impairment    Patient verbally states that they understand that the information they have provided above is current and complete to the best of their knowledge.    Patient Specific Functional Scale Modifier Scale Conversion: (patient's modifier that correlates with pt's score on PSFS): CL (73% " Impaired).    G codes and Modifier taken from pt completing a PSFS: completed at initial evaluation   Initial Primary G Code and Modifier:    Per the Patient's intake and/or assessment the Primary G Code is: Mobility .   The Patient's Impairment, Limitation or Restriction Modifier would be best described as: CN - 100% Impairment.     Goal Primary G Code and Modifier:    The Patient's G Code Goal would be: Mobility    The Patient's Impairment, Limitation or Restriction Modifier goal would be best described as: CJ - 20% - 40% Impairment.       G Codes updated 10/5/2017  Per the Patient's intake and/or assessment the Primary G Code is Mobility .  The Patient's Impairment, Limitation or Restriction Modifier would be best described as CL - 60% - 80% Impairment.     The Patient's G Code Goal would be Mobility   The Patient's Impairment, Limitation or Restriction Modifier would be best described as CJ - 20% - 40% Impairment.       Goals: Reviewed 10/5/2017   Functional Short Term Goals (4 weeks):   Patient will walk with a normal gait pattern. Progressing  Patient will be able to sleep with no disruptions due to pain. Progressing  Patient will have improved lower extremity mobility to allow for improved dressing and self-cares with minimal to no pain. Progressing    Functional Long Term Goals (8 weeks):   Patient will self-manage symptoms and return to prior level of function.  Progressing  Patient will be independent in their Home Exercise Program.Progressing  Patient will tolerate walking with less than 2/10 pain up to 30 minutes. Progressing  Patient will complete stairs with minimal to no pain. Progressing  Patient will demonstrate improved strength to allow squatting and lifting activities with no difficulty completing work related tasks. Progressing  Stand on left foot without pain. Progressing      Patient participated in goal selection and understand(s) the plan of care: Yes   Patient Potential  for Achieving Desired Outcome: Good       Response to Intervention:  Good response to treatment. Patient verbalized understanding of HEP.         Plan    Treatment Plan:      Frequency:   12 visits    Duration of Treatment: 8 weeks    Planned Interventions:    Home Exercise Program development  Therapeutic Exercise (ROM & Strengthening)  Therapeutic Activities  Neuromuscular Re-education  Manual Therapy  Ultrasound  E-Stim  Gait Training  Hot Packs / Cold Pack Modalities  Vasopneumatic Compression with Cold Therapy ( Game Ready )  Phonophoresis with Ketoprofen  Iontophoresis with Dexamethasone      Plan for next visit:  Progress exercises as symptoms allow.  Manual therapy and modalities as indicated.

## 2017-12-28 NOTE — PROGRESS NOTES
Patient Information     Patient Name MRN Sex Matt Thao [id number] Male 1980      Progress Notes by Dontrell So DO at 2017  2:15 PM     Author:  Dontrell So DO Service:  (none) Author Type:  PHYS- Osteopathic     Filed:  2017  3:15 PM Encounter Date:  2017 Status:  Signed     :  Dontrell So DO (PHYS- Osteopathic)            PROGRESS NOTE    SUBJECTIVE:  Matt May is here for recheck of a  left ankle and foot.    HPI: History of a left ankle work-related injury about 11+ weeks ago on . .  Initial x-rays and CAT scan negative for fracture.  The patient presents with a work comp representative today. The patient presents in his Cam Walker.   He has been going to physical therapy and performing home exercises.   His left heel becomes very painful and feels like it wants to explode at times.   He also has pain across the front of the ankle.   He still has swelling of the ankle region.   Therapy recommended a lace up ankle brace.   Regular work would require constant standing and walking.     Review of Systems:  Constitutional: Denies constitutional problems    PFSH:  No change in information. See earlier PFSH questionnaire completed by the patient on initial visit.    OBJECTIVE:  /90  Pulse 80  Ht 1.829 m (6')  Wt 136.1 kg (300 lb)  BMI 40.69 kg/m2 Body mass index is 40.69 kg/(m^2).  Patient is alert and orientated x3 and answers questions appropriately.    Exam of the ankle and foot:  Left ankle and foot demonstrates mild to moderate swelling around the ankle and mild swelling around the foot region.   Palpation of the heel is painful with touch.   He also demonstrates pain with palpation of the soft tissue around the front of the ankle.   Passive dorsiflexion to neutral and plantar flexion about 45-50 .   Describes pain with passive or active ankle movement today.     Attending Doctor: ZACHARIAH WILLIS ([id number])  :       FLACO MICHEL  ([id number])  Report Date:       06/23/2017 10:18:11  Report Status:       Final  ======================= Begin of Report Content ======================    PROCEDURE: XR ANKLE 3 VIEWS LEFT  HISTORY: ANKLE INJURY; .  COMPARISON: 01/11/2012  TECHNIQUE: 3 views of the left ankle were obtained.  FINDINGS: No fracture or dislocation is identified. The joint spaces are preserved. There is marked soft tissue swelling laterally.  IMPRESSION: No acute fracture.  Electronically Signed By: Morena Blair M.D. on 6/23/2017 10:13 AM    Attending Doctor: SANIYA KRAFT ([id number])  :       SARAH STONE ([id number])  Report Date:       06/25/2017 17:02:51  Report Status:       Final  ======================= Begin of Report Content ======================    EXAM:CT ANKLE LEFT WO  REASON FOR EXAM:ANKLE PAIN/PROBLEM;  CLINICAL HISTORY: Patient Age 36 years  COMPARISON: Conventional radiograph 06/23/2017  TECHNIQUE: Axial images acquired without contrast with coronal and sagittal two-dimensional reformatted images  CONTRAST: None  FINDINGS: No ankle fracture is identified. There is marked diffuse subcutaneous edema about the left ankle. Focal hematoma adjacent to the lateral malleolus measuring approximately 3.5 x 1.5 x 4.1 cm in craniocaudad, transverse, and AP dimension. Small ankle effusion, including fluid in the anterolateral ankle recess. The anterior talofibular ligament is visualized and is likely torn. This would be better evaluated with MRI. Ankle tendons appear grossly normal. No appreciable degenerative arthritis.    IMPRESSION:  1. Probable high-grade or full-thickness tearing anterior talofibular ligament.  2. Diffuse soft tissue edema about the left ankle with focal hematoma adjacent to the lateral malleolus.  3. Small ankle effusion.  4. No fracture identified.  Electronically Signed By: Sarah Stone M.D. on 6/25/2017 4:58 PM    ASSESSMENT:  Left ankle sprain, work-related injury 6/23/17  anterior talofibular  ligament injury  no fractures appreciated on prior studies  continued left ankle swelling and pain    PLAN:  Discussion with the patient and   recommend gradually weaning from the Cam Walker  prescription for a lace up ankle support  the patient has additional physical therapy sessions planned. Continue with home exercises.  Ice and elevate the ankle.  Paperwork filled out regarding work recommendations. Okay for standing and walking occasionally. Continue sit down recommendations. No heavy carrying or lifting at this time.  No steps or ladders.    Discussed with the patient there was no fracture on prior studies. Soft tissue still takes time to heal. Recommend gradually progressing activities and continue with the therapy recovery.  Recheck progress in 4 weeks.  Questions answered.    Dontrell So D.O.  Orthopedic Surgeon    [facility]  [address] [place] [id number]  Phone [phone]  Fax [phone]    2:37 PM 9/13/2017

## 2017-12-28 NOTE — PROGRESS NOTES
"Patient Information     Patient Name MRN Sex Matt Thao [id number] Male 1980      Progress Notes by Franklin Martinez, PT at 2017 12:43 PM     Author:  Franklin Martinez, PT Service:  (none) Author Type:  PT- Physical Therapist     Filed:  2017  3:26 PM Date of Service:  2017 12:43 PM Status:  Signed     :  Franklin Martinez PT (PT- Physical Therapist)            [facility]  Outpatient PT - Daily Note     Date of Service: 2017     Visit  authorized by Work Comp.     Patient Name: Matt May   YOB: 1980   Referring MD/Provider: Dr. So  Medical and Treatment Diagnosis: Left ankle pain, unspecified chronicity   PT Treatment Diagnosis: pain, weakness, impaired ROM, impaired gait, impaired neuromuscular control  Insurance: Work Comp  Start of Service: 17  Certification Dates: Start of Service: 17  Medicare/[place] Re-Cert Due: 10/12/17         Patient gave verbal consent for his QRC, Landry Stewart, to attend today's session.       Subjective      Patient reports gradual improvement with the left ankle.  He remains compliant with the HEP and current work restrictions.  States he wears the left ankle lace up brace as directed.  He continues to use a single point cane for gait.  .       Rates pain: 1/10 pain at rest.   Pain increases to 6/10 standing and walking for 30 minutes                 Objective    ROM measures next visit.     Today's Intervention:      Nustep x 5 minutes, seat 15, arms 10, level 4     Exercises:  Standing calf stretch 3 x 30\"   Weight shifting  Mini squat  Double calf raise  Ankle IV and EV with red      Ankle PF with black band x 20  Ankle DF with red x 20      Game Ready x 15 minutes, medium compression to left ankle      Home Exercise Program:    Discussed use of cold pack and elevation for pain and inflammation control  Warm water for ROM exercises may be helpful  Flex and extend toes   Active " "PF/DF  Ankle circles x 20 each way   Ankle alphabet   Left calf stretch with towel 3 x 30\"  Ankle PF with green band x 20  Ankle DF with red x 20  Towel scrunch  Weight shifting onto left foot with canes   Ankle IV and EV AROM  Standing calf stretch 3 x 30\"   SLR x 15  Side hip abduction x 15  Ankle IV and EV with band  Weight shifting  Mini squat  Double calf raise          Assessment    Therapist Assessment / Clinical Impression:  Signs and symptoms consistent with left lateral ankle sprain.  The patient is appropriate for physical therapy to address their pain, functional limitations, and physical impairments.     Functional Impairment(s): See subjective on initial evaluation and Functional Assessment / Summary Report from [facility].    Physical Impairment(s):  pain, weakness, impaired ROM, impaired gait, impaired neuromuscular control      Patient Specific Functional and Pain Scales (PSFS):    Clinician Instructions: Complete after the history and before the exam.    Initial Assessment: We want to know what 3 activities in your life you are unable to perform, or are having the most difficulty performing, as a result of your chief problem. Please list and score at least 3 activities that you are unable to perform, or having the most difficulty performing, because of your chief problem.   Patient Specific Activity Scoring Scheme (score one number for each activity):   Activity Score (0-10)  0= Unable to perform activity  10= Able to perform activity at same level as before injury or problem   1. Walking with full weight on left foot  0/10   2. Single leg stand on left foot 0/10   3. Squatting  0/10   4. Work  0/10   5.     Totals:  0/40 = 0 % ability which relates to 100% impairment    Patient verbally states that they understand that the information they have provided above is current and complete to the best of their knowledge.    Patient Specific Functional Scale Modifier Scale Conversion: (patient's modifier that " correlates with pt's score on PSFS): 0-CN (100% Impaired).    G codes and Modifier taken from pt completing a PSFS: completed at initial evaluation   Initial Primary G Code and Modifier:    Per the Patient's intake and/or assessment the Primary G Code is: Mobility .   The Patient's Impairment, Limitation or Restriction Modifier would be best described as: CN - 100% Impairment.     Goal Primary G Code and Modifier:    The Patient's G Code Goal would be: Mobility    The Patient's Impairment, Limitation or Restriction Modifier goal would be best described as: CJ - 20% - 40% Impairment.         Goals:  Functional Short Term Goals (4 weeks):   Patient will walk with a normal gait pattern.  Patient will be able to sleep with no disruptions due to pain.  Patient will have improved lower extremity mobility to allow for improved dressing and self-cares with minimal to no pain.    Functional Long Term Goals (8 weeks):   Patient will self-manage symptoms and return to prior level of function.    Patient will be independent in their Home Exercise Program.  Patient will tolerate walking with less than 2/10 pain up to 30 minutes.  Patient will complete stairs with minimal to no pain.  Patient will demonstrate improved strength to allow squatting and lifting activities with no difficulty completing work related tasks.  Stand on left foot without pain.       Patient participated in goal selection and understand(s) the plan of care: Yes   Patient Potential for Achieving Desired Outcome: Good       Response to Intervention:  Good response to treatment. Patient verbalized understanding of HEP.       Plan    Treatment Plan:      Frequency:   12 visits    Duration of Treatment: 8 weeks    Planned Interventions:    Home Exercise Program development  Therapeutic Exercise (ROM & Strengthening)  Therapeutic Activities  Neuromuscular Re-education  Manual Therapy  Ultrasound  E-Stim  Gait Training  Hot Packs / Cold Pack  Modalities  Vasopneumatic Compression with Cold Therapy ( Game Ready )  Phonophoresis with Ketoprofen  Iontophoresis with Dexamethasone      Plan for next visit:  Progress exercises as symptoms allow.  Manual therapy and modalities as indicated.

## 2017-12-28 NOTE — ADDENDUM NOTE
Patient Information     Patient Name MRN Sex Matt Thao [id number] Male 1980      Addendum Note by Gosselin, Norma J at 2017  9:17 AM     Author:  Gosselin, Norma J Service:  (none) Author Type:  (none)     Filed:  2017  9:17 AM Encounter Date:  2017 Status:  Signed     :  Gosselin, Norma J       Addended by: GOSSELIN, NORMA J on: 2017 09:17 AM        Modules accepted: Orders

## 2017-12-28 NOTE — PROGRESS NOTES
Patient Information     Patient Name MRN Sex Matt Thao [id number] Male 1980      Progress Notes by Franklin Martinez, PT at 2017  1:32 PM     Author:  Franklin Mratinez PT Service:  (none) Author Type:  PT- Physical Therapist     Filed:  2017  4:16 PM Date of Service:  2017  1:32 PM Status:  Signed     :  Franklin Martinez PT (PT- Physical Therapist)            [facility]  Outpatient PT - Daily Note       Date of Service: 2017       Visit 6 of 12 authorized by Work Comp. ( An additional 12 visits was approved on 10/5/17)    G Codes updated 10/5/2017     Patient Name: Matt May   YOB: 1980   Referring MD/Provider: Dr. So  Medical and Treatment Diagnosis: Left ankle pain, unspecified chronicity   PT Treatment Diagnosis: pain, weakness, impaired ROM, impaired gait, impaired neuromuscular control  Insurance: Work Comp  Start of Service: 17  Certification Dates: 10/5/17 to 17              Subjective        Patient reports the medial and lateral ankle remain painful.  He had a follow up with Dr. So on 11/15/17.  He has been scheduled for an MRI due to continued pain.         Rates pain: 2/10 pain at rest.                  Objective        Patient localizes pain to the distal peroneal tendons.    Today's Intervention:      Phonophoresis x 10 minutes, 3 mhz, 1.0 w/cm2 to the distal peroneal tendons     Nustep x 8 minutes, seat 15, level 6--- legs only    Slant board stretch for left calf----pin at 3 on slant board    Held today due to time constraints:   Floor to waist crate lift x 10 with 20#, x 10 with 30#   Push sled 50 feet x 3 with 25 #, pull back 50 feet x 1   MedX HS curl, seat 9, 120# x 15, 2 sets  MedX Hip abduction 100# x 30, 2 sets  MedX knee extension, seat 9, 80# x 15, 2 sets       Home Exercise Program:    Cold pack and elevation for pain and inflammation control  Warm water for ROM exercises may be  "helpful  Flex and extend toes   Active PF/DF  Ankle circles x 20 each way   Ankle alphabet   Ankle PF with  band x 20  Ankle DF with band x 20  Towel scrunch   Ankle IV and EV AROM  Standing calf stretch 3 x 30\"   SLR x 15  Side hip abduction x 15  Ankle IV and EV with band  Weight shifting  Mini squat  Double calf raise          Assessment    Therapist Assessment / Clinical Impression: Patient is making gradual progress with ROM, strength, neuromuscular control, and gait.  Pain and swelling are gradually improving.  Patient requires additional PT treatment to progress the plan of care and maximize function.     Functional Impairment(s): See subjective on initial evaluation and Functional Assessment / Summary Report from [facility].    Physical Impairment(s):  pain, weakness, impaired ROM, impaired gait, impaired neuromuscular control      Patient Specific Functional and Pain Scales (PSFS): G Codes updated 10/5/2017   Clinician Instructions: Complete after the history and before the exam.    Initial Assessment: We want to know what 3 activities in your life you are unable to perform, or are having the most difficulty performing, as a result of your chief problem. Please list and score at least 3 activities that you are unable to perform, or having the most difficulty performing, because of your chief problem.   Patient Specific Activity Scoring Scheme (score one number for each activity):   Activity Score (0-10)  0= Unable to perform activity  10= Able to perform activity at same level as before injury or problem   1. Walking with full weight on left foot  6/10   2. Single leg stand on left foot 0/10   3. Squatting  5/10   4. Work  0/10   5.     Totals:  11/40 =27 % ability which relates to 73% impairment    Patient verbally states that they understand that the information they have provided above is current and complete to the best of their knowledge.    Patient Specific Functional Scale Modifier Scale Conversion: " (patient's modifier that correlates with pt's score on PSFS): CL (73% Impaired).    G codes and Modifier taken from pt completing a PSFS: completed at initial evaluation   Initial Primary G Code and Modifier:    Per the Patient's intake and/or assessment the Primary G Code is: Mobility .   The Patient's Impairment, Limitation or Restriction Modifier would be best described as: CN - 100% Impairment.     Goal Primary G Code and Modifier:    The Patient's G Code Goal would be: Mobility    The Patient's Impairment, Limitation or Restriction Modifier goal would be best described as: CJ - 20% - 40% Impairment.       G Codes updated 10/5/2017  Per the Patient's intake and/or assessment the Primary G Code is Mobility .  The Patient's Impairment, Limitation or Restriction Modifier would be best described as CL - 60% - 80% Impairment.     The Patient's G Code Goal would be Mobility   The Patient's Impairment, Limitation or Restriction Modifier would be best described as CJ - 20% - 40% Impairment.       Goals: Reviewed 10/5/2017   Functional Short Term Goals (4 weeks):   Patient will walk with a normal gait pattern. Progressing  Patient will be able to sleep with no disruptions due to pain. Progressing  Patient will have improved lower extremity mobility to allow for improved dressing and self-cares with minimal to no pain. Progressing    Functional Long Term Goals (8 weeks):   Patient will self-manage symptoms and return to prior level of function.  Progressing  Patient will be independent in their Home Exercise Program.Progressing  Patient will tolerate walking with less than 2/10 pain up to 30 minutes. Progressing  Patient will complete stairs with minimal to no pain. Progressing  Patient will demonstrate improved strength to allow squatting and lifting activities with no difficulty completing work related tasks. Progressing  Stand on left foot without pain. Progressing      Patient participated in goal  selection and understand(s) the plan of care: Yes   Patient Potential for Achieving Desired Outcome: Good       Response to Intervention:  Good response to treatment. Patient verbalized understanding of HEP.         Plan    Treatment Plan:      Frequency:   12 visits    Duration of Treatment: 8 weeks    Planned Interventions:    Home Exercise Program development  Therapeutic Exercise (ROM & Strengthening)  Therapeutic Activities  Neuromuscular Re-education  Manual Therapy  Ultrasound  E-Stim  Gait Training  Hot Packs / Cold Pack Modalities  Vasopneumatic Compression with Cold Therapy ( Game Ready )  Phonophoresis with Ketoprofen  Iontophoresis with Dexamethasone      Plan for next visit:  Progress exercises as symptoms allow.  Manual therapy and modalities as indicated.

## 2017-12-28 NOTE — PROGRESS NOTES
Patient Information     Patient Name MRN Sex Matt Thao [id number] Male 1980      Progress Notes by Dontrell So DO at 2017 10:15 AM     Author:  Dontrell So DO Service:  (none) Author Type:  PHYS- Osteopathic     Filed:  2017 10:36 AM Encounter Date:  2017 Status:  Signed     :  Dontrell So DO (PHYS- Osteopathic)            PROGRESS NOTE    SUBJECTIVE:  Matt May is here for recheck of a  left ankle and foot.    HPI: Recheck regarding left ankle sprain. History of injury on . The patient has been in a cast for about 1 week.   There has been improvement but still intense pain at nighttime when he twists the leg.   Also getting some irritation around the proximal fiberglass cast. Otherwise the cast feels secure and not loose.   He presents with a knee scooter.   Requesting a refill on pain medication. .  Relates the soreness into the left calf is much improved.     Review of Systems:  Constitutional: Denies constitutional problems    PFSH:  No change in information. See earlier PFSH questionnaire completed by the patient on initial visit.    OBJECTIVE:  /80  Pulse 88  Wt 133.4 kg (294 lb)  BMI 39.87 kg/m2 Body mass index is 39.87 kg/(m^2).  Patient is alert and orientated x3 and answers questions appropriately.    Exam of the ankle and foot:  The left short leg fiberglass cast appears intact. There are rough edges proximally. Otherwise no breakdown.    Prior studies demonstrated ankle sprain, lateral with no fractures appreciated.    ASSESSMENT:  Left lateral ankle sprain, history of injury on 17.  No fractures appreciated.  Casted for about 1 week now.    PLAN:  Continue with current cast for about 3 more weeks.  Cast removal next visit.  Continue with a knee walker.  Prescription written for oxycodone 5 mg, dispensed 20. No additional refills. Discussed with the patient.  Discussed probability of therapy after cast removal and possible  bracing.    Dontrell So D.O.  Orthopedic Surgeon    [facility]  [address] [place] [address]  Phone [phone]  Fax [phone]    10:32 AM 7/21/2017

## 2017-12-28 NOTE — PROGRESS NOTES
"Patient Information     Patient Name MRN Sex Matt Thao [id number] Male 1980      Progress Notes by Franklin Martinez, PT at 2017  3:10 PM     Author:  Franklin Martinez PT Service:  (none) Author Type:  PT- Physical Therapist     Filed:  2017  3:56 PM Date of Service:  2017  3:10 PM Status:  Signed     :  Franklin Martinez PT (PT- Physical Therapist)            [facility]  Outpatient PT - Daily Note     Date of Service: 2017     Visit 4  12 authorized by Work Comp.     Patient Name: Matt May   YOB: 1980   Referring MD/Provider: Dr. So  Medical and Treatment Diagnosis: Left ankle pain, unspecified chronicity   PT Treatment Diagnosis: pain, weakness, impaired ROM, impaired gait, impaired neuromuscular control  Insurance: [employer]  Start of Service: 17  Certification Dates: Start of Service: 17  Medicare/MA Re-Cert Due: 10/12/17               Subjective      Patient states he \"tweaked\" the left foot getting out of the shower yesterday.  States the left foot slide and bumped the foot on the toilet.   States the foot and ankle are feeling better today.      Rates pain: 3/10 pain at rest.                  Objective          Today's Intervention:      Exercises:  Flex and extend toes   Active PF/DF  Ankle circles x 20 each way   Ankle alphabet   Ankle IV and EV AROM  Left calf stretch with towel 3 x 30\"----Dorsiflexion to neutral    Ankle PF with black band x 20  Ankle DF with red x 20    Towel scrunch    Grade 2-3 talocrural distraction and A/P mobs     BAPS level 3--seated: forward/back, and circles     Weight shifting onto left foot       Game ready to left ankle x 15 minutes, low compression       Home Exercise Program:  Work on HEP as listed above 5 times a day  Discussed use of cold pack and elevation for pain and inflammation control  Warm water for ROM exercises may be helpful  Flex and extend toes   Active PF/DF  Ankle " "circles x 20 each way   Ankle alphabet   Left calf stretch with towel 3 x 30\"  Ankle PF with green band x 20  Ankle DF with red x 20  Towel scrunch  Weight shifting onto left foot with canes   Ankle IV and EV AROM        Assessment    Therapist Assessment / Clinical Impression:  Signs and symptoms consistent with left lateral ankle sprain.  The patient is appropriate for physical therapy to address their pain, functional limitations, and physical impairments.     Functional Impairment(s): See subjective on initial evaluation and Functional Assessment / Summary Report from [facility].    Physical Impairment(s):  pain, weakness, impaired ROM, impaired gait, impaired neuromuscular control      Patient Specific Functional and Pain Scales (PSFS):    Clinician Instructions: Complete after the history and before the exam.    Initial Assessment: We want to know what 3 activities in your life you are unable to perform, or are having the most difficulty performing, as a result of your chief problem. Please list and score at least 3 activities that you are unable to perform, or having the most difficulty performing, because of your chief problem.   Patient Specific Activity Scoring Scheme (score one number for each activity):   Activity Score (0-10)  0= Unable to perform activity  10= Able to perform activity at same level as before injury or problem   1. Walking with full weight on left foot  0/10   2. Single leg stand on left foot 0/10   3. Squatting  0/10   4. Work  0/10   5.     Totals:  0/40 = 0 % ability which relates to 100% impairment    Patient verbally states that they understand that the information they have provided above is current and complete to the best of their knowledge.    Patient Specific Functional Scale Modifier Scale Conversion: (patient's modifier that correlates with pt's score on PSFS): 0-CN (100% Impaired).    G codes and Modifier taken from pt completing a PSFS: completed at initial evaluation   Initial " Primary G Code and Modifier:    Per the Patient's intake and/or assessment the Primary G Code is: Mobility .   The Patient's Impairment, Limitation or Restriction Modifier would be best described as: CN - 100% Impairment.     Goal Primary G Code and Modifier:    The Patient's G Code Goal would be: Mobility    The Patient's Impairment, Limitation or Restriction Modifier goal would be best described as: CJ - 20% - 40% Impairment.         Goals:  Functional Short Term Goals (4 weeks):   Patient will walk with a normal gait pattern.  Patient will be able to sleep with no disruptions due to pain.  Patient will have improved lower extremity mobility to allow for improved dressing and self-cares with minimal to no pain.    Functional Long Term Goals (8 weeks):   Patient will self-manage symptoms and return to prior level of function.    Patient will be independent in their Home Exercise Program.  Patient will tolerate walking with less than 2/10 pain up to 30 minutes.  Patient will complete stairs with minimal to no pain.  Patient will demonstrate improved strength to allow squatting and lifting activities with no difficulty completing work related tasks.  Stand on left foot without pain.       Patient participated in goal selection and understand(s) the plan of care: Yes   Patient Potential for Achieving Desired Outcome: Good       Response to Intervention:  Good response to treatment. Patient verbalized understanding of HEP.       Plan    Treatment Plan:      Frequency:   12 visits    Duration of Treatment: 8 weeks    Planned Interventions:    Home Exercise Program development  Therapeutic Exercise (ROM & Strengthening)  Therapeutic Activities  Neuromuscular Re-education  Manual Therapy  Ultrasound  E-Stim  Gait Training  Hot Packs / Cold Pack Modalities  Vasopneumatic Compression with Cold Therapy ( Game Ready )  Phonophoresis with Ketoprofen  Iontophoresis with Dexamethasone      Plan for next visit:   Progress exercises as symptoms allow.  Manual therapy and modalities as indicated.

## 2017-12-28 NOTE — PROGRESS NOTES
Patient Information     Patient Name MRN Sex Matt Thao [id number] Male 1980      Progress Notes by Randa Olivas at 2017  1:46 PM     Author:  Randa Olivas Service:  (none) Author Type:  PT- Physical Therapy Assistant     Filed:  2017  2:31 PM Date of Service:  2017  1:46 PM Status:  Signed     :  Randa Olivas (PT- Physical Therapy Assistant)            [facility]  Outpatient PT - Daily Note       Date of Service: 2017     Visit 7 of 12 authorized by Work Comp. ( An additional 12 visits was approved on 10/5/17)    G Codes updated 10/5/2017     Patient Name: Matt May   YOB: 1980   Referring MD/Provider: Dr. So  Medical and Treatment Diagnosis: Left ankle pain, unspecified chronicity   PT Treatment Diagnosis: pain, weakness, impaired ROM, impaired gait, impaired neuromuscular control  Insurance: Work Comp  Start of Service: 17  Certification Dates: 10/5/17 to 17              Subjective        Florencio will have an MRI on Monday, then will see Dr. So the following week for results.     Florencio reports that he stepped on some uneven terrain, really hurt for a few hours following this incident, but had his daughter do an ice massage on it.      Rates pain: 2/10 pain at rest.          Objective      Patient localizes pain to the distal peroneal tendons.    Today's Intervention:      Nustep x8 minutes, seat 15, level 6--- legs only    Phonophoresis x 10 minutes, 3 mhz, 1.0 w/cm2 to the distal peroneal tendons     Slant board stretch for left calf----pin at 3 on slant board    Floor to waist crate lift x 10 with 20#, x 10 with 30# - deferred   Push sled 50 feet x 3 with 25 #, pull back 50 feet x 1 - deferred   MedX HS curl, seat 9, 120# x 15, 2 sets  MedX Hip abduction 100# x 30, 2 sets  MedX knee extension, seat 9, 80# x 15, 2 sets       Home Exercise Program:    Cold pack and elevation for pain and  "inflammation control  Warm water for ROM exercises may be helpful  Flex and extend toes   Active PF/DF  Ankle circles x 20 each way   Ankle alphabet   Ankle PF with  band x 20  Ankle DF with band x 20  Towel scrunch   Ankle IV and EV AROM  Standing calf stretch 3 x 30\"   SLR x 15  Side hip abduction x 15  Ankle IV and EV with band  Weight shifting  Mini squat  Double calf raise          Assessment    Therapist Assessment / Clinical Impression: Patient is making gradual progress with ROM, strength, neuromuscular control, and gait.  Pain and swelling are gradually improving.  Patient requires additional PT treatment to progress the plan of care and maximize function.     Functional Impairment(s): See subjective on initial evaluation and Functional Assessment / Summary Report from [facility].    Physical Impairment(s):  pain, weakness, impaired ROM, impaired gait, impaired neuromuscular control      Patient Specific Functional and Pain Scales (PSFS): G Codes updated 10/5/2017   Clinician Instructions: Complete after the history and before the exam.    Initial Assessment: We want to know what 3 activities in your life you are unable to perform, or are having the most difficulty performing, as a result of your chief problem. Please list and score at least 3 activities that you are unable to perform, or having the most difficulty performing, because of your chief problem.   Patient Specific Activity Scoring Scheme (score one number for each activity):   Activity Score (0-10)  0= Unable to perform activity  10= Able to perform activity at same level as before injury or problem   1. Walking with full weight on left foot  6/10   2. Single leg stand on left foot 0/10   3. Squatting  5/10   4. Work  0/10   5.     Totals:  11/40 =27 % ability which relates to 73% impairment    Patient verbally states that they understand that the information they have provided above is current and complete to the best of their knowledge.    Patient " Specific Functional Scale Modifier Scale Conversion: (patient's modifier that correlates with pt's score on PSFS): CL (73% Impaired).    G codes and Modifier taken from pt completing a PSFS: completed at initial evaluation   Initial Primary G Code and Modifier:    Per the Patient's intake and/or assessment the Primary G Code is: Mobility .   The Patient's Impairment, Limitation or Restriction Modifier would be best described as: CN - 100% Impairment.     Goal Primary G Code and Modifier:    The Patient's G Code Goal would be: Mobility    The Patient's Impairment, Limitation or Restriction Modifier goal would be best described as: CJ - 20% - 40% Impairment.       G Codes updated 10/5/2017  Per the Patient's intake and/or assessment the Primary G Code is Mobility .  The Patient's Impairment, Limitation or Restriction Modifier would be best described as CL - 60% - 80% Impairment.     The Patient's G Code Goal would be Mobility   The Patient's Impairment, Limitation or Restriction Modifier would be best described as CJ - 20% - 40% Impairment.       Goals: Reviewed 10/5/2017   Functional Short Term Goals (4 weeks):   Patient will walk with a normal gait pattern. Progressing  Patient will be able to sleep with no disruptions due to pain. Progressing  Patient will have improved lower extremity mobility to allow for improved dressing and self-cares with minimal to no pain. Progressing    Functional Long Term Goals (8 weeks):   Patient will self-manage symptoms and return to prior level of function.  Progressing  Patient will be independent in their Home Exercise Program.Progressing  Patient will tolerate walking with less than 2/10 pain up to 30 minutes. Progressing  Patient will complete stairs with minimal to no pain. Progressing  Patient will demonstrate improved strength to allow squatting and lifting activities with no difficulty completing work related tasks. Progressing  Stand on left foot without  pain. Progressing      Patient participated in goal selection and understand(s) the plan of care: Yes   Patient Potential for Achieving Desired Outcome: Good       Response to Intervention:  Good response to treatment. Patient verbalized understanding of HEP.         Plan    Treatment Plan:      Frequency:   12 visits    Duration of Treatment: 8 weeks    Planned Interventions:    Home Exercise Program development  Therapeutic Exercise (ROM & Strengthening)  Therapeutic Activities  Neuromuscular Re-education  Manual Therapy  Ultrasound  E-Stim  Gait Training  Hot Packs / Cold Pack Modalities  Vasopneumatic Compression with Cold Therapy ( Game Ready )  Phonophoresis with Ketoprofen  Iontophoresis with Dexamethasone      Plan for next visit:  Progress exercises as symptoms allow.  Manual therapy and modalities as indicated.

## 2017-12-30 NOTE — NURSING NOTE
Patient Information     Patient Name MRN Sex Matt Thao [id number] Male 1980      Nursing Note by Gosselin, Norma J at 10/18/2017  1:15 PM     Author:  Gosselin, Norma J Service:  (none) Author Type:  (none)     Filed:  10/18/2017  1:07 PM Encounter Date:  10/18/2017 Status:  Signed     :  Gosselin, Norma J            Work Comp follow up Left Ankle DOI:[email or web address]  Norma J Gosselin LPN....................  10/18/2017   1:07 PM

## 2017-12-30 NOTE — NURSING NOTE
Patient Information     Patient Name MRN Sex Matt Thao [id number] Male 1980      Nursing Note by Vee Brooks at 11/15/2017 11:15 AM     Author:  Vee Brooks Service:  (none) Author Type:  (none)     Filed:  11/15/2017 11:09 AM Encounter Date:  11/15/2017 Status:  Signed     :  Vee Brooks            Patient is here for a work comp follow up on his left ankle.  DOI: 17  Vee Brooks LPN .......11/15/2017 11:08 AM

## 2017-12-30 NOTE — NURSING NOTE
Patient Information     Patient Name MRN Sex Matt Thao [id number] Male 1980      Nursing Note by Meg Parra at 2017 12:45 PM     Author:  Meg Parra Service:  (none) Author Type:  (none)     Filed:  2017 12:59 PM Encounter Date:  2017 Status:  Signed     :  Meg Parra            Work comp left ankle follow up. Patient says he has increased swelling and is worse.   Meg Parra LPN ....................2017  12:46 PM

## 2017-12-30 NOTE — NURSING NOTE
[facility]  History and Physical     Shayy Joseph Patient Status:  Emergency    3/1/1953 MRN [id number]   Location [address] [facility] Attending Chanel Reed MD   Hosp Day # 0 PCP Binu Herzog MD     Chief Complaint: Patient Information     Patient Name MRN Sex Matt Thao [id number] Male 1980      Nursing Note by Vee Brooks at 2017  1:15 PM     Author:  Vee Brooks Service:  (none) Author Type:  (none)     Filed:  2017  1:25 PM Encounter Date:  2017 Status:  Signed     :  Vee Brooks            Patient is here for his work comp injury of his left ankle.  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 Dexamethasone               Comment:Other reaction(s):  Other             Steroidal psychosis  Dexamethasone Sodiu*    Other (See Comments)    Comment:Steroidal psychosis  Floxin [Ofloxacin]        Other                       Comm MG Sublingual SL Tab Place 0.4 mg under the tongue as needed. Disp:  Rfl:    ursodiol 300 MG Oral Cap Take 300 mg by mouth 3 (three) times daily. Disp:  Rfl:    Rosuvastatin Calcium 5 MG Oral Tab Take 5 mg by mouth nightly.  Disp:  Rfl:    ergocalciferol 50 rhonchi. Cardiovascular: S1, S2. Regular rate and rhythm. No murmurs, rubs or gallops. Equal pulses. Chest and Back: No tenderness or deformity. Abdomen: Soft, nontender, nondistended. Positive bowel sounds. No rebound, guarding or organomegaly.   Godfrey May

## 2017-12-30 NOTE — NURSING NOTE
Patient Information     Patient Name MRN Sex Matt Thao [id number] Male 1980      Nursing Note by Diamond Rodriguez at 2017  8:30 AM     Author:  Diamond Rodriguez Service:  (none) Author Type:  (none)     Filed:  2017  8:22 AM Encounter Date:  2017 Status:  Signed     :  Diamond Rodriguez            Patient is here today for work comp follow up Left leg. DOI 17. Diamond Rodriguez LPGUERDA......................2017 8:21 AM

## 2017-12-30 NOTE — INITIAL ASSESSMENTS
"Patient Information     Patient Name MRN Sex Matt Thao [id number] Male 1980      Initial Assessments by Franklin Martinez, PT at 2017  2:14 PM     Author:  Franklin Martinez PT Service:  (none) Author Type:  PT- Physical Therapist     Filed:  2017  5:00 PM Date of Service:  2017  2:14 PM Status:  Signed     :  Franklin Martinez PT (PT- Physical Therapist)            [facility]  Outpatient PT - Initial Evaluation  Lower Extremity Eval    Date of Service: 2017     Visit 1 of 12 authorized by Work Comp.     Patient Name: Matt May   YOB: 1980   Referring MD/Provider: Dr. So  Medical and Treatment Diagnosis: Left ankle pain, unspecified chronicity   PT Treatment Diagnosis: pain, weakness, impaired ROM, impaired gait, impaired neuromuscular control  Insurance: Work Comp  Start of Service: 17  Certification Dates: Start of Service: 17  Medicare/MA Re-Cert Due: 10/12/17     Living Situations:  Independent in Living Situation      Preadmission Functional Mobility: Independent    Precautions:    Cognition:  Oriented to Person, Place, and Time.     Were cultural / age or other special adaptations needed? No      Patient is a vulnerable adult: No      Patient is aware of diagnosis: Yes      Risks and benefits explained: Yes    Subjective      History: Patient sustained a left lateral ankle sprain on 17.  Patient states he jumped off the back of his work truck and the left foot landed on a 6 inch hose.  He describes an inversion injury of the ankle. He was initially casted.  States the cast came off on 17.  He was placed in a CAM boot.  He was instructed to gradually progress weight bearing.  Patient states he is using crutches.  He reports when he bears weight on the foot, he experiences \"pins and needles\" and \"burning\" in the the heel and foot.       Rates pain: No pain at rest.  Pain increases to a 8-9/10 when he tries to " "put weight on it.   Describes pain: pins and needles, burning   Locates pain: heel, and posterior-lateral ankle/fibula.     Current functional limitations: walking, jumping, running, stairs    Prior Level:  Minimal to no difficulty completing the above functional activities.      Past Medical History:     Diagnosis  Date     Adhesive capsulitis of left shoulder 11/29/2016     GERD (gastroesophageal reflux disease)      Mantoux: positive 2009    negative chest x-ray. Completed isoniazid.      Tendinitis of left rotator cuff 11/28/2016     Past Surgical History:      Procedure  Laterality Date     APPENDECTOMY  2005         Occupation:  Works for [employer]     Previous Treatment:    Pain Meds / Anti-inflammatory Meds  - No   Physical Therapy - No   Injections - No   Surgery - No  Pain Clinic - No    Diagnostics:  Reviewed (see chart)   Current Medications:  Reviewed (see chart)    Drug Allergies:  Reviewed (see chart)  ?   Latex Allergy:  No         Objective        Ankle Range of Motion   ACTIVE  PASSIVE      Right Left Right Left Normal   Dorsiflexion  lackgin 10 degrees from neutral    20   Plantarflexion  20   50   Inversion  NA   35   Eversion  NA   15     Ankle Strength   Right Left   Dorsiflexion  3-   Plantarflexion  3-   Inversion  NA   Eversion  NA     0-Absent   1-Trace   2-Poor   3-Fair   4-Good   5-Normal    Observation:  Edema noted over the left foot and ankle    Palpation:  Hypersensitivity to light palpation over the heel, dorsum of foot and lateral ankle    Gait:  Uses crutches. Minimal ability to bear weight when walking.      Special Tests:  NA    Today's Intervention:      Exercoses  Flex and extend toes   Ankle PF/DF with towel assist for dorsiflexion   Active PF/DF  Left calf stretch with towel 3 x 30\"  Instruction in desensitization techniques for the foot    Weight shifting onto left foot with crutches    Worked on walking with increased weight through the left foot with CAM boot and " crutches     Home Exercise Program:  Work on HEP as listed above 5 times a day  Discussed use of cold pack and elevation for pain and inflammation control  Warm water for ROM exercises may be helpful      Assessment    Therapist Assessment / Clinical Impression:  Signs and symptoms consistent with left lateral ankle sprain.  The patient is appropriate for physical therapy to address their pain, functional limitations, and physical impairments.     Functional Impairment(s): See subjective on initial evaluation and Functional Assessment / Summary Report from [facility].    Physical Impairment(s):  pain, weakness, impaired ROM, impaired gait, impaired neuromuscular control      Patient Specific Functional and Pain Scales (PSFS):    Clinician Instructions: Complete after the history and before the exam.    Initial Assessment: We want to know what 3 activities in your life you are unable to perform, or are having the most difficulty performing, as a result of your chief problem. Please list and score at least 3 activities that you are unable to perform, or having the most difficulty performing, because of your chief problem.   Patient Specific Activity Scoring Scheme (score one number for each activity):   Activity Score (0-10)  0= Unable to perform activity  10= Able to perform activity at same level as before injury or problem   1. Walking with full weight on left foot  0/10   2. Single leg stand on left foot 0/10   3. Squatting  0/10   4. Work  0/10   5.     Totals:  0/40 = 0 % ability which relates to 100% impairment    Patient verbally states that they understand that the information they have provided above is current and complete to the best of their knowledge.    Patient Specific Functional Scale Modifier Scale Conversion: (patient's modifier that correlates with pt's score on PSFS): 0-CN (100% Impaired).    G codes and Modifier taken from pt completing a PSFS: completed at initial evaluation   Initial Primary G Code  and Modifier:    Per the Patient's intake and/or assessment the Primary G Code is: Mobility .   The Patient's Impairment, Limitation or Restriction Modifier would be best described as: CN - 100% Impairment.     Goal Primary G Code and Modifier:    The Patient's G Code Goal would be: Mobility    The Patient's Impairment, Limitation or Restriction Modifier goal would be best described as: CJ - 20% - 40% Impairment.         Goals:  Functional Short Term Goals (4 weeks):   Patient will walk with a normal gait pattern.  Patient will be able to sleep with no disruptions due to pain.  Patient will have improved lower extremity mobility to allow for improved dressing and self-cares with minimal to no pain.    Functional Long Term Goals (8 weeks):   Patient will self-manage symptoms and return to prior level of function.    Patient will be independent in their Home Exercise Program.  Patient will tolerate walking with less than 2/10 pain up to 30 minutes.  Patient will complete stairs with minimal to no pain.  Patient will demonstrate improved strength to allow squatting and lifting activities with no difficulty completing work related tasks.  Stand on left foot without pain.       Patient participated in goal selection and understand(s) the plan of care: Yes   Patient Potential for Achieving Desired Outcome: Good       Response to Intervention:  Good response to treatment. Patient verbalized understanding of HEP.       Plan    Treatment Plan:      Frequency:   12 visits    Duration of Treatment: 8 weeks    Planned Interventions:    Home Exercise Program development  Therapeutic Exercise (ROM & Strengthening)  Therapeutic Activities  Neuromuscular Re-education  Manual Therapy  Ultrasound  E-Stim  Gait Training  Hot Packs / Cold Pack Modalities  Vasopneumatic Compression with Cold Therapy ( Game Ready )  Phonophoresis with Ketoprofen  Iontophoresis with Dexamethasone      Plan for next visit:  Progress exercises as  symptoms allow.  Manual therapy and modalities as indicated.     Thank you for your referral to [facility].  Please call with any questions, concerns or comments.  [phone]    The signature, of the referring medical provider, on this document indicates certification of the above prescribed plan of care and is medically necessary.

## 2017-12-30 NOTE — NURSING NOTE
Patient Information     Patient Name MRN Sex Matt Thao [id number] Male 1980      Nursing Note by Gosselin, Norma J at 2017  2:15 PM     Author:  Gosselin, Norma J Service:  (none) Author Type:  (none)     Filed:  2017  2:11 PM Encounter Date:  2017 Status:  Signed     :  Gosselin, Norma J            Worker comp follow up left leg. DOI: 17  Norma J Gosselin LPN....................  2017   2:10 PM

## 2017-12-30 NOTE — NURSING NOTE
Patient Information     Patient Name MRN Sex Matt Thao [id number] Male 1980      Nursing Note by Vee Brooks at 2017 10:15 AM     Author:  Vee Brooks Service:  (none) Author Type:  (none)     Filed:  2017 10:09 AM Encounter Date:  2017 Status:  Signed     :  Vee Brooks            Patient is here for his work comp follow up on his left ankle injury.  DOI: 17  Vee Brooks LPN .......2017 10:09 AM
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
## 2018-01-02 NOTE — PROGRESS NOTES
Patient Information     Patient Name MRN Sex Matt Thao [id number] Male 1980      Progress Notes by Franklin Martinez, PT at 2017  8:41 AM     Author:  Franklin Martinez PT Service:  (none) Author Type:  PT- Physical Therapist     Filed:  2017  9:28 AM Date of Service:  2017  8:41 AM Status:  Signed     :  Franklin Martinez PT (PT- Physical Therapist)            [facility]  Outpatient PT - Daily Note     Date of Service: 2016     Visit #: 6 of 13 ( Evaluation and 12 visits approved by Work Comp.)    Patient Name: Matt May   YOB: 1980   Referring MD/Provider: Dr. Caballero   Medical and Treatment Diagnosis: Adhesive capsulitis of left shoulder   PT Treatment Diagnosis: L shoulder pain, weakness  Insurance: Work Comp  Start of Service: 16  Certification Dates: Start of Service: 16  Medicare/MA Re-Cert Due: 17             Subjective        Patient reportts continued improvement with the left shoulder.  Patient had a follow up with Dr. Caballero.  Dr. Caballero increased his lifting restriction to 20#.  He is hoping to return to full duty in 4 weeks.  States tingling has resolved in the left arm and fingers.  No pain with over head reaching.  He does have pain when reaching into abduction beyond 90 degrees.     Rates left shoulder pain: 0/10 at rest.  Pain increases to a 3/10 with reaching into abduction today.    No neck pain            Objective    Active R shoulder flexion:   DATE: 16        ROM:  Flexion    AROM: 168  AAROM  Pulley   Flexion  135  Sore today following   Arthrogram  AROM:  170  degrees AROM  170  degrees      Pt specifics: in standing           Today s Interventions:     Exercises:  -UBE x 2 minutes forward---stopped due to increasing soreness  -wall walk flexion--reviewed  -Supine flexion x 25  -Supine protraction 25  -Sidelying ER 1# x 15  -Sidelying Abduction x  15  -Scaption: x 25  -Lawnmower Row 2# x 20  -Bicep curl 2# x 25  -IR with yellow x 30      Home Exercise Program:   Patient was educated to stop HEP if anything caused pain or his symptoms  -Scap sets 15-20 repetitions, 3-5x/day  -Sidelying ER: 10-15 repetitions 1x/day   -Scaption: 10-15 repetitions, 1x/day  -Lawnmower Row: 5-10 times to start, progress up to 30 as able.   -Supine flexion   -Supine protraction   -Sidelying Abduction   -Bicep curl   -IR with yellow     Assessment    Therapist Assessment / Clinical Impression:  Signs and symptoms consistent with L shoulder strain.  The patient is appropriate for physical therapy to address their pain, functional limitations, and physical impairments.      Functional Impairment(s): See subjective on initial evaluation and Functional Assessment / Summary Report from [facility].    Physical Impairment(s): L shoulder pain and weakness      Patient Specific Functional and Pain Scales (PSFS):    Clinician Instructions: Complete after the history and before the exam.    Initial Assessment: We want to know what 3 activities in your life you are unable to perform, or are having the most difficulty performing, as a result of your chief problem. Please list and score at least 3 activities that you are unable to perform, or having the most difficulty performing, because of your chief problem.   Patient Specific Activity Scoring Scheme (score one number for each activity):   Activity Score (0-10)  0= Unable to perform activity  10= Able to perform activity at same level as before injury or problem   1. Turning head and body to L  7/10   2. Reaching overhead 9/10   3. Reaching behind his back 9/10   4. Pushing heavy object (Crystal potty is 130#)  0/10   5. Carrying and emptying a 5 gallon pain of water 0/10   Totals:  25/50 = 50 % ability which relates to 50% impairment    Patient verbally states that they understand that the information they have provided above is current and complete  to the best of their knowledge.    Patient Specific Functional Scale Modifier Scale Conversion: (patient's modifier that correlates with pt's score on PSFS): CK(50% Impaired).    G codes and Modifier taken from pt completing a PSFS: completed at initial evaluation   Initial Primary G Code and Modifier:    Per the Patient's intake and/or assessment the Primary G Code is: Mobility .   The Patient's Impairment, Limitation or Restriction Modifier would be best described as: CK - 40% - 60%% Impairment.     Goal Primary G Code and Modifier:    The Patient's G Code Goal would be: Mobility    The Patient's Impairment, Limitation or Restriction Modifier goal would be best described as: CI - 1% - 20% Impairment.         Goals:  Functional Short Term Goals (4 weeks):   Patient will perform a wall push up with little to no pain in order to begin return to work activities  Patient will tolerate reaching overhead with 2/10 pain or less   Patient will be able to turn and look to the Left with 3/10 pain or less     Functional Long Term Goals (8 weeks):     Patient is to self-manage symptoms and return to prior level of function.    Patient is to be independent in their Home Exercise Program.  Patient is to tolerate all reaching and lifting activities above shoulder height with less than 2/10 pain.    Patient is to demonstrate improved strength to allow lifting and carrying activities with no difficulty completing work related tasks.  Patient will be able to turn and look to the Left with no pain     Patient participated in goal selection and understand(s) the plan of care: Yes   Patient Potential for Achieving Desired Outcome: Good     Response to Intervention:  Good response to treatment.  Patient verbalizes understanding of HEP.      Plan    Treatment Plan:      Frequency:   12 visits    Duration of Treatment: 8 weeks    Planned Interventions:    Home Exercise Program development  Therapeutic Exercise (ROM &  Strengthening)  Therapeutic Activities  Neuromuscular Re-education  Manual Therapy  Ultrasound  E-Stim  Hot Packs / Cold Pack Modalities  Vasopneumatic Compression with Cold Therapy ( Game Ready )  Iontophoresis with Dexamethasone  Mechanical Traction    Plan for next visit:  Progress ROM as symptoms allow

## 2018-01-02 NOTE — NURSING NOTE
Patient Information     Patient Name MRN Sex Matt Thao [id number] Male 1980      Nursing Note by Cheryl Davis at 2017 10:45 AM     Author:  Cheryl Davis Service:  (none) Author Type:  (none)     Filed:  2017 10:38 AM Encounter Date:  2017 Status:  Signed     :  Cheryl Davis            Pt presents for a follow up on his left shoulder pain. Here to review MRI.    Cheryl Davis CMA 2017 10:38 AM

## 2018-01-03 NOTE — NURSING NOTE
Patient Information     Patient Name MRN Sex Matt Thao [id number] Male 1980      Nursing Note by Gosselin, Norma J at 2017  1:15 PM     Author:  Gosselin, Norma J Service:  (none) Author Type:  (none)     Filed:  2017  1:15 PM Encounter Date:  2017 Status:  Signed     :  Gosselin, Norma J            Follow up left shoulder MR results.  DOI: 16  Norma J Gosselin LPN....................  2017   1:13 PM

## 2018-01-03 NOTE — DISCHARGE SUMMARY
Patient Information     Patient Name MRN Sex Matt Thao [id number] Male 1980      Discharge Summaries by Franklin Martinez, PT at 2017 10:02 AM     Author:  Franklin Martinez PT Service:  (none) Author Type:  PT- Physical Therapist     Filed:  2017 10:05 AM Date of Service:  2017 10:02 AM Status:  Signed     :  Franklin Martinez PT (PT- Physical Therapist)            [facility]  Outpatient PT - Discharge Summary    Patient Name: Matt May   YOB: 1980   Referring MD/Provider: Dr. Caballero   Medical and Treatment Diagnosis: Adhesive capsulitis of left shoulder   PT Treatment Diagnosis: L shoulder pain, weakness  Insurance: Work Comp  Start of Service: 16  Certification Dates: 2017 to  3/28/17    Date of discharge: 2017     Dates of Service: 16    to  17  Number of visits: 11          Reason for Discharge:  Goals of therapy met.  Patient was contacted by phone for follow up.  He reported he has returned to work without restrictions.  He does not need additional PT at this time.         Progress from Initial to Discharge (if applicable):    Comments:  See progress note on 17 for status at the time of final visit.     Discharge G codes not completed due to an unplanned discharge.     Further Recommendations:    Patient will continue with established home exercise program  Patient will return to physician for follow-up.      Patient is discharged from Physical Therapy    Thank you for your referral to [facility].  Please call with any questions, concerns or comments.  [phone]

## 2018-01-03 NOTE — PROGRESS NOTES
Patient Information     Patient Name MRN Sex Matt Thao [id number] Male 1980      Progress Notes by Franklin Martinez, PT at 2017  9:56 AM     Author:  Franklin Martinez PT Service:  (none) Author Type:  PT- Physical Therapist     Filed:  2017 10:38 AM Date of Service:  2017  9:56 AM Status:  Signed     :  Franklin Martinez PT (PT- Physical Therapist)            [facility]  Outpatient PT - Daily Note     Date of Service: 2017      Visit #: 10 of 13 ( Evaluation and 12 visits approved by Work Comp.)    G Codes updated 2017     Patient Name: Matt May   YOB: 1980   Referring MD/Provider: Dr. Caballero   Medical and Treatment Diagnosis: Adhesive capsulitis of left shoulder   PT Treatment Diagnosis: L shoulder pain, weakness  Insurance: Work Comp  Start of Service: 16  Certification Dates: 2017 to  3/28/17            Subjective        Patient reports a good response to treatment last session.  He has been progressing his HEP as able.          Rates left shoulder pain: 1/10 at rest.  Pain increases to a 3/10 with reaching into abduction.    No neck pain            Objective        Active R shoulder flexion:   DATE: 16    ROM:  Flexion    AROM: 168  AAROM  Pulley   Flexion  135  Sore today following   Arthrogram  AROM:  170  degrees AROM  170  degrees AROM  170  degrees AROM  170  degrees AROM  170  degrees   Pt specifics: standing         Today s Interventions:     Exercises:  -UBE x 2 minutes forward, then 2 minutes backward--- resistance at 50.  -wall walk flexion--reviewed    MedX Row 80# x 11  MedX Lat pull 80# x 15   MedX chest press, seat 5, 60# x 10      -Standing flexion 1# x 20, x 15   -Scaption 1# x 20, x 10   -Bicep curl 8# x 15  -IR with RYLAND 4 kg x 30  -Supine protraction 2# x 30  -Sidelying ER 2# x 20  -Sidelying Abduction x 20    -Ball roll on wall at  90 degrees flexion--Home today    Patient will ice at home today.     Home Exercise Program:   Patient was educated to stop HEP if anything caused pain or his symptoms  -Scap sets 15-20 repetitions, 3-5x/day  -Sidelying ER: 10-15 repetitions 1x/day   -Scaption: 10-15 repetitions, 1x/day  -Lawnmower Row: 5-10 times to start, progress up to 30 as able.   -Supine flexion   -Supine protraction   -Sidelying Abduction   -Bicep curl   -IR with band   -Ball roll on wall at 90 degrees flexion        Assessment    Therapist Assessment / Clinical Impression:  Signs and symptoms consistent with L shoulder strain.  Patient continues to make progress with AROM.  Shoulder abduction motions remains limited by pain.  He continues to make progress with strength and function.  Patient requires additional PT sessions to maximize strength and function.      Functional Impairment(s): See subjective on initial evaluation and Functional Assessment / Summary Report from [facility].    Physical Impairment(s): L shoulder pain and weakness      Patient Specific Functional and Pain Scales (PSFS): G Codes updated 1/31/2017    Clinician Instructions: Complete after the history and before the exam.    Initial Assessment: We want to know what 3 activities in your life you are unable to perform, or are having the most difficulty performing, as a result of your chief problem. Please list and score at least 3 activities that you are unable to perform, or having the most difficulty performing, because of your chief problem.   Patient Specific Activity Scoring Scheme (score one number for each activity):   Activity Score (0-10)  0= Unable to perform activity  10= Able to perform activity at same level as before injury or problem   1. Turning head and body to L  10/10   2. Reaching overhead 9/10   3. Reaching behind his back 9/10   4. Pushing heavy object (Crystal potty is 130#)  0/10   5. Carrying and emptying a 5 gallon pain of water 5/10   Totals:  33/50 =  66% ability which relates to 34% impairment    Patient verbally states that they understand that the information they have provided above is current and complete to the best of their knowledge.    Patient Specific Functional Scale Modifier Scale Conversion: (patient's modifier that correlates with pt's score on PSFS): ANAYA(34% Impaired).    G codes and Modifier taken from pt completing a PSFS: completed at initial evaluation   Initial Primary G Code and Modifier:    Per the Patient's intake and/or assessment the Primary G Code is: Mobility .   The Patient's Impairment, Limitation or Restriction Modifier would be best described as: CK - 40% - 60%% Impairment.     Goal Primary G Code and Modifier:    The Patient's G Code Goal would be: Mobility    The Patient's Impairment, Limitation or Restriction Modifier goal would be best described as: CI - 1% - 20% Impairment.       G Codes updated 1/31/2017   Per the Patient's intake and/or assessment the Primary G Code is Mobility .  The Patient's Impairment, Limitation or Restriction Modifier would be best described as CJ - 20% - 40% Impairment.     The Patient's G Code Goal would be Mobility   The Patient's Impairment, Limitation or Restriction Modifier would be best described as CI - 1% - 20% Impairment.       Goals: reviewed 1/31/2017   Functional Short Term Goals (4 weeks):   Patient will perform a wall push up with little to no pain in order to begin return to work activities Progressing   Patient will tolerate reaching overhead with 2/10 pain or less Progressing   Patient will be able to turn and look to the Left with 3/10 pain or less Met     Functional Long Term Goals (8 weeks):   Patient is to self-manage symptoms and return to prior level of function.  Progressing   Patient is to be independent in their Home Exercise Program. Progressing   Patient is to tolerate all reaching and lifting activities above shoulder height with less than 2/10 pain.   Progressing   Patient is to demonstrate improved strength to allow lifting and carrying activities with no difficulty completing work related tasks. Progressing   Patient will be able to turn and look to the Left with no pain Met     Patient participated in goal selection and understand(s) the plan of care: Yes   Patient Potential for Achieving Desired Outcome: Good     Response to Intervention:  Good response to treatment.  Patient verbalizes understanding of HEP.            Plan      Treatment Plan:      Frequency:   12 visits    Duration of Treatment: 8 weeks    Planned Interventions:    Home Exercise Program development  Therapeutic Exercise (ROM & Strengthening)  Therapeutic Activities  Neuromuscular Re-education  Manual Therapy  Ultrasound  E-Stim  Hot Packs / Cold Pack Modalities  Vasopneumatic Compression with Cold Therapy ( Game Ready )  Iontophoresis with Dexamethasone  Mechanical Traction    Plan for next visit:  Progress ROM and strength as symptoms allow.  Functional training as indicated.

## 2018-01-03 NOTE — PROGRESS NOTES
Patient Information     Patient Name MRN Sex Matt Thao [id number] Male 1980      Progress Notes by Franklin Martinez, PT at 2017  8:57 AM     Author:  Franklin Martinez PT Service:  (none) Author Type:  PT- Physical Therapist     Filed:  2017 10:44 AM Date of Service:  2017  8:57 AM Status:  Signed     :  Franklin Martinez PT (PT- Physical Therapist)            [facility]  Outpatient PT - Recertification Note     Date of Service: 2017     Visit #: 9 of 13 ( Evaluation and 12 visits approved by Work Comp.)    G Codes updated 2017     Patient Name: Matt May   YOB: 1980   Referring MD/Provider: Dr. Caballero   Medical and Treatment Diagnosis: Adhesive capsulitis of left shoulder   PT Treatment Diagnosis: L shoulder pain, weakness  Insurance: Work Comp  Start of Service: 16  Certification Dates: 2017 to  3/28/17            Subjective        Patient had a follow up with Dr. Caballero.  He was pleased with progress.  He recommended 4-6 more weeks of PT.  Patient has been released back to work.  Patient reports 90% improvement overall.        Rates left shoulder pain: 0/10 at rest.  Pain increases to a 3/10 with reaching into abduction.    No neck pain            Objective    Active R shoulder flexion:   DATE: 16    ROM:  Flexion    AROM: 168  AAROM  P[place]   Flexion  135  Sore today following   Arthrogram  AROM:  170  degrees AROM  170  degrees AROM  170  degrees AROM  170  degrees AROM  170  degrees   Pt specifics: standing         Today s Interventions:     Exercises:  -UBE x 2 minutes forward, then 2 minutes backward--- resistance at 50.  -wall walk flexion--reviewed    -Supine protraction 2# x 30  -Sidelying ER 2# x 30  -Sidelying Abduction x 20  -Standing flexion 1# x 30   -Scaption 1# x 25  -Bicep curl 7# x 30  -IR with blue x 30  -Ball roll on wall at 90  degrees flexion    Added:  MedX Row 80# x 10  MedX Lat pull 80# x 15     Discussed and reviewed proper body mechanics to carry and empty 5 gallon pails.  Discussed filling a bucket 1/2 full and taking two trips.     Patient requests to ice at home today.     Home Exercise Program:   Patient was educated to stop HEP if anything caused pain or his symptoms  -Scap sets 15-20 repetitions, 3-5x/day  -Sidelying ER: 10-15 repetitions 1x/day   -Scaption: 10-15 repetitions, 1x/day  -Lawnmower Row: 5-10 times to start, progress up to 30 as able.   -Supine flexion   -Supine protraction   -Sidelying Abduction   -Bicep curl   -IR with band   -Ball roll on wall at 90 degrees flexion        Assessment    Therapist Assessment / Clinical Impression:  Signs and symptoms consistent with L shoulder strain.  Patient continues to make progress with AROM.  Shoulder abduction motions remains limited by pain.  He continues to make progress with strength and function.  Patient requires additional PT sessions to maximize strength and function.      Functional Impairment(s): See subjective on initial evaluation and Functional Assessment / Summary Report from [facility].    Physical Impairment(s): L shoulder pain and weakness      Patient Specific Functional and Pain Scales (PSFS): G Codes updated 1/31/2017    Clinician Instructions: Complete after the history and before the exam.    Initial Assessment: We want to know what 3 activities in your life you are unable to perform, or are having the most difficulty performing, as a result of your chief problem. Please list and score at least 3 activities that you are unable to perform, or having the most difficulty performing, because of your chief problem.   Patient Specific Activity Scoring Scheme (score one number for each activity):   Activity Score (0-10)  0= Unable to perform activity  10= Able to perform activity at same level as before injury or problem   1. Turning head and body to L  10/10   2.  Reaching overhead 9/10   3. Reaching behind his back 9/10   4. Pushing heavy object (Crystal potty is 130#)  0/10   5. Carrying and emptying a 5 gallon pain of water 5/10   Totals:  33/50 = 66% ability which relates to 34% impairment    Patient verbally states that they understand that the information they have provided above is current and complete to the best of their knowledge.    Patient Specific Functional Scale Modifier Scale Conversion: (patient's modifier that correlates with pt's score on PSFS): CJ(34% Impaired).    G codes and Modifier taken from pt completing a PSFS: completed at initial evaluation   Initial Primary G Code and Modifier:    Per the Patient's intake and/or assessment the Primary G Code is: Mobility .   The Patient's Impairment, Limitation or Restriction Modifier would be best described as: CK - 40% - 60%% Impairment.     Goal Primary G Code and Modifier:    The Patient's G Code Goal would be: Mobility    The Patient's Impairment, Limitation or Restriction Modifier goal would be best described as: CI - 1% - 20% Impairment.       G Codes updated 1/31/2017   Per the Patient's intake and/or assessment the Primary G Code is Mobility .  The Patient's Impairment, Limitation or Restriction Modifier would be best described as CJ - 20% - 40% Impairment.     The Patient's G Code Goal would be Mobility   The Patient's Impairment, Limitation or Restriction Modifier would be best described as CI - 1% - 20% Impairment.       Goals: reviewed 1/31/2017   Functional Short Term Goals (4 weeks):   Patient will perform a wall push up with little to no pain in order to begin return to work activities Progressing   Patient will tolerate reaching overhead with 2/10 pain or less Progressing   Patient will be able to turn and look to the Left with 3/10 pain or less Met     Functional Long Term Goals (8 weeks):   Patient is to self-manage symptoms and return to prior level of function.  Progressing    Patient is to be independent in their Home Exercise Program. Progressing   Patient is to tolerate all reaching and lifting activities above shoulder height with less than 2/10 pain.  Progressing   Patient is to demonstrate improved strength to allow lifting and carrying activities with no difficulty completing work related tasks. Progressing   Patient will be able to turn and look to the Left with no pain Met     Patient participated in goal selection and understand(s) the plan of care: Yes   Patient Potential for Achieving Desired Outcome: Good     Response to Intervention:  Good response to treatment.  Patient verbalizes understanding of HEP.          Plan    Treatment Plan:      Frequency:   12 visits    Duration of Treatment: 8 weeks    Planned Interventions:    Home Exercise Program development  Therapeutic Exercise (ROM & Strengthening)  Therapeutic Activities  Neuromuscular Re-education  Manual Therapy  Ultrasound  E-Stim  Hot Packs / Cold Pack Modalities  Vasopneumatic Compression with Cold Therapy ( Game Ready )  Iontophoresis with Dexamethasone  Mechanical Traction    Plan for next visit:  Progress ROM and strength as symptoms allow.  Functional training as indicated.      Thank you for your referral to [facility]. Please call with any questions, concerns or comments. [phone]     The signature, of the referring medical provider, on this document indicates certification of the above prescribed plan of care and is medically necessary.

## 2018-01-03 NOTE — PROGRESS NOTES
Patient Information     Patient Name MRN Sex Matt Thao [id number] Male 1980      Progress Notes by Franklin Martinez, PT at 2017 10:34 AM     Author:  Franklin Martinez PT Service:  (none) Author Type:  PT- Physical Therapist     Filed:  2017 11:07 AM Date of Service:  2017 10:34 AM Status:  Signed     :  Franklin Martinez PT (PT- Physical Therapist)            [facility]  Outpatient PT - Daily Note     Date of Service: 2017     Visit #: 7 of 13 ( Evaluation and 12 visits approved by Work Comp.)    Patient Name: Matt May   YOB: 1980   Referring MD/Provider: Dr. Caballero   Medical and Treatment Diagnosis: Adhesive capsulitis of left shoulder   PT Treatment Diagnosis: L shoulder pain, weakness  Insurance: Work Comp  Start of Service: 16  Certification Dates: Start of Service: 16  Medicare/MA Re-Cert Due: 17             Subjective        Patient reports he feels about 90% improved overall.  No pain with over head reaching.  He does have pain when reaching into abduction beyond 90 degrees.     Rates left shoulder pain: 0/10 at rest.  Pain increases to a 3/10 with reaching into abduction today.    No neck pain            Objective    Active R shoulder flexion:   DATE: 16      ROM:  Flexion    AROM: 168  AAROM  P[place]ley   Flexion  135  Sore today following   Arthrogram  AROM:  170  degrees AROM  170  degrees AROM  170  degrees     Pt specifics: in standing         Today s Interventions:     Exercises:  -UBE x 2 minutes forward, then 2 minutes backward   -wall walk flexion--reviewed  -Supine flexion 3/4# x 25  -Supine protraction 3/4# x 25  -Sidelying ER 2# x 15  -Sidelying Abduction x 15  -Scaption x 25  -Lawnmower Row 3# x 25  -Bicep curl 5# x 30  -IR with red x 30    Patient requests to ice at home today.      Home Exercise Program:   Patient was educated to stop HEP if anything  caused pain or his symptoms  -Scap sets 15-20 repetitions, 3-5x/day  -Sidelying ER: 10-15 repetitions 1x/day   -Scaption: 10-15 repetitions, 1x/day  -Lawnmower Row: 5-10 times to start, progress up to 30 as able.   -Supine flexion   -Supine protraction   -Sidelying Abduction   -Bicep curl   -IR with yellow     Assessment    Therapist Assessment / Clinical Impression:  Signs and symptoms consistent with L shoulder strain.  The patient is appropriate for physical therapy to address their pain, functional limitations, and physical impairments.      Functional Impairment(s): See subjective on initial evaluation and Functional Assessment / Summary Report from [facility].    Physical Impairment(s): L shoulder pain and weakness      Patient Specific Functional and Pain Scales (PSFS):    Clinician Instructions: Complete after the history and before the exam.    Initial Assessment: We want to know what 3 activities in your life you are unable to perform, or are having the most difficulty performing, as a result of your chief problem. Please list and score at least 3 activities that you are unable to perform, or having the most difficulty performing, because of your chief problem.   Patient Specific Activity Scoring Scheme (score one number for each activity):   Activity Score (0-10)  0= Unable to perform activity  10= Able to perform activity at same level as before injury or problem   1. Turning head and body to L  7/10   2. Reaching overhead 9/10   3. Reaching behind his back 9/10   4. Pushing heavy object (Crystal potty is 130#)  0/10   5. Carrying and emptying a 5 gallon pain of water 0/10   Totals:  25/50 = 50 % ability which relates to 50% impairment    Patient verbally states that they understand that the information they have provided above is current and complete to the best of their knowledge.    Patient Specific Functional Scale Modifier Scale Conversion: (patient's modifier that correlates with pt's score on PSFS):  CK(50% Impaired).    G codes and Modifier taken from pt completing a PSFS: completed at initial evaluation   Initial Primary G Code and Modifier:    Per the Patient's intake and/or assessment the Primary G Code is: Mobility .   The Patient's Impairment, Limitation or Restriction Modifier would be best described as: CK - 40% - 60%% Impairment.     Goal Primary G Code and Modifier:    The Patient's G Code Goal would be: Mobility    The Patient's Impairment, Limitation or Restriction Modifier goal would be best described as: CI - 1% - 20% Impairment.         Goals:  Functional Short Term Goals (4 weeks):   Patient will perform a wall push up with little to no pain in order to begin return to work activities  Patient will tolerate reaching overhead with 2/10 pain or less   Patient will be able to turn and look to the Left with 3/10 pain or less     Functional Long Term Goals (8 weeks):     Patient is to self-manage symptoms and return to prior level of function.    Patient is to be independent in their Home Exercise Program.  Patient is to tolerate all reaching and lifting activities above shoulder height with less than 2/10 pain.    Patient is to demonstrate improved strength to allow lifting and carrying activities with no difficulty completing work related tasks.  Patient will be able to turn and look to the Left with no pain     Patient participated in goal selection and understand(s) the plan of care: Yes   Patient Potential for Achieving Desired Outcome: Good     Response to Intervention:  Good response to treatment.  Patient verbalizes understanding of HEP.      Plan    Treatment Plan:      Frequency:   12 visits    Duration of Treatment: 8 weeks    Planned Interventions:    Home Exercise Program development  Therapeutic Exercise (ROM & Strengthening)  Therapeutic Activities  Neuromuscular Re-education  Manual Therapy  Ultrasound  E-Stim  Hot Packs / Cold Pack Modalities  Vasopneumatic Compression with  Cold Therapy ( Game Ready )  Iontophoresis with Dexamethasone  Mechanical Traction    Plan for next visit:  Progress ROM as symptoms allow

## 2018-01-03 NOTE — PROGRESS NOTES
Patient Information     Patient Name MRN Sex Matt Thao [id number] Male 1980      Progress Notes by Franklin Martinez, PT at 2017  8:55 AM     Author:  Franklin Martinez PT Service:  (none) Author Type:  PT- Physical Therapist     Filed:  2017 11:10 AM Date of Service:  2017  8:55 AM Status:  Signed     :  Franklin Martinez PT (PT- Physical Therapist)            [facility]  Outpatient PT - Daily Note     Date of Service: 2017     Visit #: 8 of 13 ( Evaluation and 12 visits approved by Work Comp.)    Patient Name: Matt May   YOB: 1980   Referring MD/Provider: Dr. Caballero   Medical and Treatment Diagnosis: Adhesive capsulitis of left shoulder   PT Treatment Diagnosis: L shoulder pain, weakness  Insurance: Work Comp  Start of Service: 16  Certification Dates: Start of Service: 16  Medicare/MA Re-Cert Due: 17             Subjective        Patient reports he can now wash his hair without left shoulder pain.  He reports progressive improvement with strength.  States motion into abduction remains limited by pain.  He remains compliant with current HEP.      Rates left shoulder pain: 0/10 at rest.  Pain increases to a 3/10 with reaching into abduction.    No neck pain            Objective    Active R shoulder flexion:   DATE: 16     ROM:  Flexion    AROM: 168  AAROM  Pulley   Flexion  135  Sore today following   Arthrogram  AROM:  170  degrees AROM  170  degrees AROM  170  degrees AROM  170  degrees    Pt specifics: standing         Today s Interventions:     Exercises:  -UBE x 2 minutes forward, then 2 minutes backward   -wall walk flexion--reviewed    -Supine protraction 3/4# x 25  -Sidelying ER 2# x 20  -Sidelying Abduction x 15  -Standing flexion x 25  -Scaption x 25  -Lawnmower Row 4# x 25  -Bicep curl 5# x 30  -IR with blue x 30    Added:  -Ball roll on wall at 90  degrees flexion    Patient requests to ice at home today.      Home Exercise Program:   Patient was educated to stop HEP if anything caused pain or his symptoms  -Scap sets 15-20 repetitions, 3-5x/day  -Sidelying ER: 10-15 repetitions 1x/day   -Scaption: 10-15 repetitions, 1x/day  -Lawnmower Row: 5-10 times to start, progress up to 30 as able.   -Supine flexion   -Supine protraction   -Sidelying Abduction   -Bicep curl   -IR with band   -Ball roll on wall at 90 degrees flexion        Assessment    Therapist Assessment / Clinical Impression:  Signs and symptoms consistent with L shoulder strain.  The patient is appropriate for physical therapy to address their pain, functional limitations, and physical impairments.      Functional Impairment(s): See subjective on initial evaluation and Functional Assessment / Summary Report from [facility].    Physical Impairment(s): L shoulder pain and weakness      Patient Specific Functional and Pain Scales (PSFS):    Clinician Instructions: Complete after the history and before the exam.    Initial Assessment: We want to know what 3 activities in your life you are unable to perform, or are having the most difficulty performing, as a result of your chief problem. Please list and score at least 3 activities that you are unable to perform, or having the most difficulty performing, because of your chief problem.   Patient Specific Activity Scoring Scheme (score one number for each activity):   Activity Score (0-10)  0= Unable to perform activity  10= Able to perform activity at same level as before injury or problem   1. Turning head and body to L  7/10   2. Reaching overhead 9/10   3. Reaching behind his back 9/10   4. Pushing heavy object (Crystal potty is 130#)  0/10   5. Carrying and emptying a 5 gallon pain of water 0/10   Totals:  25/50 = 50 % ability which relates to 50% impairment    Patient verbally states that they understand that the information they have provided above is current  and complete to the best of their knowledge.    Patient Specific Functional Scale Modifier Scale Conversion: (patient's modifier that correlates with pt's score on PSFS): CK(50% Impaired).    G codes and Modifier taken from pt completing a PSFS: completed at initial evaluation   Initial Primary G Code and Modifier:    Per the Patient's intake and/or assessment the Primary G Code is: Mobility .   The Patient's Impairment, Limitation or Restriction Modifier would be best described as: CK - 40% - 60%% Impairment.     Goal Primary G Code and Modifier:    The Patient's G Code Goal would be: Mobility    The Patient's Impairment, Limitation or Restriction Modifier goal would be best described as: CI - 1% - 20% Impairment.         Goals:  Functional Short Term Goals (4 weeks):   Patient will perform a wall push up with little to no pain in order to begin return to work activities  Patient will tolerate reaching overhead with 2/10 pain or less   Patient will be able to turn and look to the Left with 3/10 pain or less     Functional Long Term Goals (8 weeks):     Patient is to self-manage symptoms and return to prior level of function.    Patient is to be independent in their Home Exercise Program.  Patient is to tolerate all reaching and lifting activities above shoulder height with less than 2/10 pain.    Patient is to demonstrate improved strength to allow lifting and carrying activities with no difficulty completing work related tasks.  Patient will be able to turn and look to the Left with no pain     Patient participated in goal selection and understand(s) the plan of care: Yes   Patient Potential for Achieving Desired Outcome: Good     Response to Intervention:  Good response to treatment.  Patient verbalizes understanding of HEP.      Plan    Treatment Plan:      Frequency:   12 visits    Duration of Treatment: 8 weeks    Planned Interventions:    Home Exercise Program development  Therapeutic Exercise (ROM  & Strengthening)  Therapeutic Activities  Neuromuscular Re-education  Manual Therapy  Ultrasound  E-Stim  Hot Packs / Cold Pack Modalities  Vasopneumatic Compression with Cold Therapy ( Game Ready )  Iontophoresis with Dexamethasone  Mechanical Traction    Plan for next visit:  Progress ROM as symptoms allow

## 2018-01-26 VITALS
HEIGHT: 72 IN | DIASTOLIC BLOOD PRESSURE: 80 MMHG | SYSTOLIC BLOOD PRESSURE: 138 MMHG | WEIGHT: 308 LBS | HEIGHT: 73 IN | BODY MASS INDEX: 40.82 KG/M2 | WEIGHT: 300 LBS | HEART RATE: 80 BPM | DIASTOLIC BLOOD PRESSURE: 90 MMHG | BODY MASS INDEX: 40.63 KG/M2 | SYSTOLIC BLOOD PRESSURE: 150 MMHG

## 2018-01-26 VITALS
SYSTOLIC BLOOD PRESSURE: 118 MMHG | HEART RATE: 80 BPM | WEIGHT: 294 LBS | DIASTOLIC BLOOD PRESSURE: 80 MMHG | SYSTOLIC BLOOD PRESSURE: 126 MMHG | BODY MASS INDEX: 39.87 KG/M2 | DIASTOLIC BLOOD PRESSURE: 80 MMHG | BODY MASS INDEX: 39.82 KG/M2 | WEIGHT: 294 LBS | HEART RATE: 88 BPM | HEIGHT: 72 IN

## 2018-01-26 VITALS
WEIGHT: 308 LBS | HEART RATE: 84 BPM | HEIGHT: 72 IN | HEART RATE: 72 BPM | BODY MASS INDEX: 40.82 KG/M2 | HEIGHT: 73 IN | WEIGHT: 294 LBS | BODY MASS INDEX: 39.82 KG/M2 | DIASTOLIC BLOOD PRESSURE: 80 MMHG | DIASTOLIC BLOOD PRESSURE: 82 MMHG | SYSTOLIC BLOOD PRESSURE: 138 MMHG | SYSTOLIC BLOOD PRESSURE: 130 MMHG

## 2018-01-26 VITALS
DIASTOLIC BLOOD PRESSURE: 90 MMHG | WEIGHT: 300 LBS | DIASTOLIC BLOOD PRESSURE: 90 MMHG | HEART RATE: 80 BPM | HEART RATE: 88 BPM | WEIGHT: 300 LBS | SYSTOLIC BLOOD PRESSURE: 130 MMHG | BODY MASS INDEX: 40.63 KG/M2 | SYSTOLIC BLOOD PRESSURE: 148 MMHG | HEIGHT: 72 IN

## 2018-01-26 VITALS
DIASTOLIC BLOOD PRESSURE: 88 MMHG | HEIGHT: 72 IN | WEIGHT: 300 LBS | SYSTOLIC BLOOD PRESSURE: 138 MMHG | BODY MASS INDEX: 40.63 KG/M2 | HEART RATE: 88 BPM

## 2018-02-09 VITALS
HEART RATE: 88 BPM | DIASTOLIC BLOOD PRESSURE: 90 MMHG | HEIGHT: 72 IN | SYSTOLIC BLOOD PRESSURE: 160 MMHG | WEIGHT: 300 LBS | BODY MASS INDEX: 40.63 KG/M2

## 2018-02-12 NOTE — PROGRESS NOTES
"Patient Information     Patient Name MRN Sex Matt Thao [id number] Male 1980      Progress Notes by Franklin Martinez, PT at 2017  1:48 PM     Author:  Franklin Martinez, PT Service:  (none) Author Type:  PT- Physical Therapist     Filed:  2017  2:29 PM Date of Service:  2017  1:48 PM Status:  Signed     :  Franklin Martinez PT (PT- Physical Therapist)            [facility]  Outpatient PT - Daily Note       Date of Service: 2017      Visit 9 of 12 authorized by Work Comp. ( An additional 12 visits was approved on 10/5/17)    G Codes updated 2017     Patient Name: Matt May   YOB: 1980   Referring MD/Provider: Dr. So  Medical and Treatment Diagnosis: Left ankle pain, unspecified chronicity   PT Treatment Diagnosis: pain, weakness, impaired ROM, impaired gait, impaired neuromuscular control  Insurance: Work Comp  Start of Service: 17  Certification Dates: 17 to 18            Subjective        Patient reports continued ankle pain during stance to push off phase of gait.  He continues to use a cane for gait.         Rates pain: 1-2/10 pain at rest.          Objective          Today's Intervention:      Phonophoresis x 10 minutes, 3 mhz, 1.0 w/cm2 to the distal peroneal tendons     Nustep x 8 minutes, seat 15, level 6--- legs only    Slant board stretch for left calf----pin at 3 on slant board    MedX HS curl, seat 9, 120# x 15, 2 sets  MedX Hip abduction 100# x 30, 2 sets  MedX knee extension, seat 9, 80# x 15, 2 sets       Home Exercise Program:    Cold pack and elevation for pain and inflammation control  Warm water for ROM exercises may be helpful  Flex and extend toes   Active PF/DF  Ankle circles x 20 each way   Ankle alphabet   Ankle PF with  band x 20  Ankle DF with band x 20  Towel scrunch   Ankle IV and EV AROM  Standing calf stretch 3 x 30\"   SLR x 15  Side hip abduction x 15  Ankle IV and EV with " band  Weight shifting  Mini squat  Double calf raise          Assessment    Therapist Assessment / Clinical Impression: Patient is making gradual progress with ROM, strength, neuromuscular control, and gait.  Pain and swelling are gradually improving.  Patient requires additional PT treatment to progress the plan of care and maximize function.     Functional Impairment(s): See subjective on initial evaluation and Functional Assessment / Summary Report from [facility].    Physical Impairment(s):  pain, weakness, impaired ROM, impaired gait, impaired neuromuscular control      Patient Specific Functional and Pain Scales (PSFS): G Codes updated 12/5/2017    Clinician Instructions: Complete after the history and before the exam.    Initial Assessment: We want to know what 3 activities in your life you are unable to perform, or are having the most difficulty performing, as a result of your chief problem. Please list and score at least 3 activities that you are unable to perform, or having the most difficulty performing, because of your chief problem.   Patient Specific Activity Scoring Scheme (score one number for each activity):   Activity Score (0-10)  0= Unable to perform activity  10= Able to perform activity at same level as before injury or problem   1. Walking with full weight on left foot  6/10   2. Single leg stand on left foot 0/10   3. Squatting  5/10   4. Work  0/10   5.     Totals:  11/40 =27 % ability which relates to 73% impairment    Patient verbally states that they understand that the information they have provided above is current and complete to the best of their knowledge.    Patient Specific Functional Scale Modifier Scale Conversion: (patient's modifier that correlates with pt's score on PSFS): CL (73% Impaired).    G codes and Modifier taken from pt completing a PSFS: completed at initial evaluation   Initial Primary G Code and Modifier:    Per the Patient's intake and/or assessment the Primary G Code  is: Mobility .   The Patient's Impairment, Limitation or Restriction Modifier would be best described as: CN - 100% Impairment.     Goal Primary G Code and Modifier:    The Patient's G Code Goal would be: Mobility    The Patient's Impairment, Limitation or Restriction Modifier goal would be best described as: CJ - 20% - 40% Impairment.       G Codes updated 10/5/2017  Per the Patient's intake and/or assessment the Primary G Code is Mobility .  The Patient's Impairment, Limitation or Restriction Modifier would be best described as CL - 60% - 80% Impairment.     The Patient's G Code Goal would be Mobility   The Patient's Impairment, Limitation or Restriction Modifier would be best described as CJ - 20% - 40% Impairment.       G Codes updated 12/5/2017   Per the Patient's intake and/or assessment the Primary G Code is Mobility .  The Patient's Impairment, Limitation or Restriction Modifier would be best described as CL - 60% - 80% Impairment.     The Patient's G Code Goal would be Mobility   The Patient's Impairment, Limitation or Restriction Modifier would be best described as CJ - 20% - 40% Impairment.       Goals: Reviewed 12/5/2017   Functional Short Term Goals (4 weeks):   Patient will walk with a normal gait pattern. Progressing  Patient will be able to sleep with no disruptions due to pain. Progressing  Patient will have improved lower extremity mobility to allow for improved dressing and self-cares with minimal to no pain. Progressing    Functional Long Term Goals (8 weeks):   Patient will self-manage symptoms and return to prior level of function.  Progressing  Patient will be independent in their Home Exercise Program.Progressing  Patient will tolerate walking with less than 2/10 pain up to 30 minutes. Progressing  Patient will complete stairs with minimal to no pain. Progressing  Patient will demonstrate improved strength to allow squatting and lifting activities with no  difficulty completing work related tasks. Progressing  Stand on left foot without pain. Progressing      Patient participated in goal selection and understand(s) the plan of care: Yes   Patient Potential for Achieving Desired Outcome: Good       Response to Intervention:  Good response to treatment. Patient verbalized understanding of HEP.         Plan    Treatment Plan:      Frequency:   12 visits    Duration of Treatment: 8 weeks    Planned Interventions:    Home Exercise Program development  Therapeutic Exercise (ROM & Strengthening)  Therapeutic Activities  Neuromuscular Re-education  Manual Therapy  Ultrasound  E-Stim  Gait Training  Hot Packs / Cold Pack Modalities  Vasopneumatic Compression with Cold Therapy ( Game Ready )  Phonophoresis with Ketoprofen  Iontophoresis with Dexamethasone      Plan for next visit:  Progress exercises as symptoms allow.  Manual therapy and modalities as indicated.

## 2018-02-12 NOTE — NURSING NOTE
Patient Information     Patient Name MRN Sex Matt Thao [id number] Male 1980      Nursing Note by Gosselin, Norma J at 2017  2:15 PM     Author:  Gosselin, Norma J Service:  (none) Author Type:  (none)     Filed:  2017  1:50 PM Encounter Date:  2017 Status:  Signed     :  Gosselin, Norma J            Work-comp follow up MRI Left ankle [id number]  Norma J Gosselin LPN....................  2017   1:48 PM

## 2018-02-12 NOTE — PROGRESS NOTES
Patient Information     Patient Name MRN Sex Matt Thao [id number] Male 1980      Progress Notes by Dontrell So DO at 2017  2:15 PM     Author:  Dontrell So DO Service:  (none) Author Type:  PHYS- Osteopathic     Filed:  2017  3:04 PM Encounter Date:  2017 Status:  Signed     :  Dontrell So DO (PHYS- Osteopathic)            PROGRESS NOTE    SUBJECTIVE:  Matt May is here for recheck of a  left ankle and foot.    HPI: Patient presents for recheck after left ankle MRI. Presents with his [facility]. The patient is wearing a ankle brace and regular shoes.   Relates continued pain to the foot and ankle. Most of his pain remains on the lateral side of the ankle. Some on the inside or medial aspect and some on the top of the foot.   Worse with walking activities. Worse with twisting the foot or ankle.   Previous treatment included physical therapy. He continues with therapy. .    Review of Systems:  Constitutional: Denies constitutional problems    PFSH:  No change in information. See earlier PFSH questionnaire completed by the patient on initial visit.    OBJECTIVE:  /90  Pulse 88  Ht 1.829 m (6')  Wt 136.1 kg (300 lb)  BMI 40.69 kg/m2 Body mass index is 40.69 kg/(m^2).  Patient is alert and orientated x3 and answers questions appropriately.    Exam of the ankle and foot:  Examination of the left foot and ankle demonstrates overall good alignment.  Pain elicited with anterior and posterior testing but no significant shifting appreciated.  Pain over the lateral ankle ligamentous complexes with palpation. Most of the pain is along the posterior region. Mild pain along the peroneal tendons proximal to the tip of the malleolus.  No pain over the fifth metatarsal base with palpation.  Mild to moderate pain over the medial deltoid especially along the anterior region.  Passive ankle dorsiflexion about 5-10 . Plantar flexion about 40 . Pain elicited.    Radiographic  images where independently reviewed and discussed with the patient.   Attending Doctor: SHADI LI ([id number])  :       VICKI WONG ([id number])  Report Date:       11/27/2017 16:01:17  Report Status:       Final  ======================= Begin of Report Content ======================    Exam: MR ANKLE LEFT WO  History: Chronic pain of left ankle  Technique: Sagittal, coronal and axial images were obtained with combination of proton density, T2 and T1 weighted sequences.  Findings: No occult fracture or bone bruise is seen. Talar dome appears intact. There is no significant ankle joint effusion.  Achilles tendon is intact as is the plantar fascia. Other posterior lateral and posterior medial tendinous structures are intact.  There is disruption in the anterior talofibular ligament with thickening of and increased signal intensity within the posterior talofibular ligament. Tibiofibular ligaments are disrupted. Calcaneofibular ligament is disrupted. There is also disruption of deep fibers of the deltoid ligament. More superficial fibers are intact.  No soft tissue mass is seen. There is no focal fluid collection. There is some degenerative change in the ankle joint with thinning of articular cartilage over the medial talus. No subchondral marrow edema is seen.    Impression: Evidence of previous ligamentous injury both medially and laterally. No tendon disruption or focal bone lesion.  Electronically Signed By: Vicki Wong M.D. on 11/27/2017 3:57 PM    ASSESSMENT:  Left ankle sprain 5-1/2 months ago  recent MRI depression included prior ligamentous disruption of both medial and lateral ankle complex.  Continued left ankle and foot pain    PLAN:  Recommendation is for referral to foot and ankle specialist at this time.  Recommend referral to Dr. Rehman, foot and ankle at [facility].  Referral regarding continued ankle pain and possible ligamentous instability, left ankle.  Continue with  current ankle brace and prior work recommendations. Paperwork filled out.  Patient in agreement.  Continue with therapy and exercise.    Please send a copy of the clinic note to Dr. Salinas Rehman at [facility].    Dontrell So D.O.  Orthopedic Surgeon    [address] [address]  Phone [phone]  Fax [phone]    2:35 PM 12/5/2017

## 2018-02-12 NOTE — PROGRESS NOTES
Patient Information     Patient Name MRN Sex Matt Thao [id number] Male 1980      Progress Notes by Franklin Martinez PT at 2017  2:41 PM     Author:  Franklin Martinez PT Service:  (none) Author Type:  PT- Physical Therapist     Filed:  2017  3:32 PM Date of Service:  2017  2:41 PM Status:  Signed     :  Franklin Martinez PT (PT- Physical Therapist)            [facility]  Outpatient PT - Recertification  Note       Date of Service: 2017      Visit 8 of 12 authorized by Work Comp. ( An additional 12 visits was approved on 10/5/17)    G Codes updated 2017     Patient Name: Matt May   YOB: 1980   Referring MD/Provider: Dr. So  Medical and Treatment Diagnosis: Left ankle pain, unspecified chronicity   PT Treatment Diagnosis: pain, weakness, impaired ROM, impaired gait, impaired neuromuscular control  Insurance: Work Comp  Start of Service: 17  Certification Dates: 17 to 18              Subjective        Patient had a follow up with Dr. So today.  MRI results were reviewed.  He is being referred to a foot and ankle specialist in [place].  Dr. So recommended to continue PT.       Rates pain: 1-2/10 pain at rest.          Objective          Today's Intervention:      Phonophoresis x 10 minutes, 3 mhz, 1.0 w/cm2 to the distal peroneal tendons     Nustep x 8 minutes, seat 15, level 6--- legs only    Slant board stretch for left calf----pin at 3 on slant board    Tried single leg balance, but stopped due to pain.     MedX HS curl, seat 9, 120# x 15, 2 sets  MedX Hip abduction 100# x 30, 2 sets  MedX knee extension, seat 9, 80# x 15, 2 sets       Home Exercise Program:    Cold pack and elevation for pain and inflammation control  Warm water for ROM exercises may be helpful  Flex and extend toes   Active PF/DF  Ankle circles x 20 each way   Ankle alphabet   Ankle PF with  band x 20  Ankle DF with band x 20  Towel  "scrunch   Ankle IV and EV AROM  Standing calf stretch 3 x 30\"   SLR x 15  Side hip abduction x 15  Ankle IV and EV with band  Weight shifting  Mini squat  Double calf raise          Assessment    Therapist Assessment / Clinical Impression: Patient is making gradual progress with ROM, strength, neuromuscular control, and gait.  Pain and swelling are gradually improving.  Patient requires additional PT treatment to progress the plan of care and maximize function.     Functional Impairment(s): See subjective on initial evaluation and Functional Assessment / Summary Report from TO.    Physical Impairment(s):  pain, weakness, impaired ROM, impaired gait, impaired neuromuscular control      Patient Specific Functional and Pain Scales (PSFS): G Codes updated 12/5/2017    Clinician Instructions: Complete after the history and before the exam.    Initial Assessment: We want to know what 3 activities in your life you are unable to perform, or are having the most difficulty performing, as a result of your chief problem. Please list and score at least 3 activities that you are unable to perform, or having the most difficulty performing, because of your chief problem.   Patient Specific Activity Scoring Scheme (score one number for each activity):   Activity Score (0-10)  0= Unable to perform activity  10= Able to perform activity at same level as before injury or problem   1. Walking with full weight on left foot  6/10   2. Single leg stand on left foot 0/10   3. Squatting  5/10   4. Work  0/10   5.     Totals:  11/40 =27 % ability which relates to 73% impairment    Patient verbally states that they understand that the information they have provided above is current and complete to the best of their knowledge.    Patient Specific Functional Scale Modifier Scale Conversion: (patient's modifier that correlates with pt's score on PSFS): CL (73% Impaired).    G codes and Modifier taken from pt completing a PSFS: completed at " initial evaluation   Initial Primary G Code and Modifier:    Per the Patient's intake and/or assessment the Primary G Code is: Mobility .   The Patient's Impairment, Limitation or Restriction Modifier would be best described as: CN - 100% Impairment.     Goal Primary G Code and Modifier:    The Patient's G Code Goal would be: Mobility    The Patient's Impairment, Limitation or Restriction Modifier goal would be best described as: CJ - 20% - 40% Impairment.       G Codes updated 10/5/2017  Per the Patient's intake and/or assessment the Primary G Code is Mobility .  The Patient's Impairment, Limitation or Restriction Modifier would be best described as CL - 60% - 80% Impairment.     The Patient's G Code Goal would be Mobility   The Patient's Impairment, Limitation or Restriction Modifier would be best described as CJ - 20% - 40% Impairment.       G Codes updated 12/5/2017   Per the Patient's intake and/or assessment the Primary G Code is Mobility .  The Patient's Impairment, Limitation or Restriction Modifier would be best described as CL - 60% - 80% Impairment.     The Patient's G Code Goal would be Mobility   The Patient's Impairment, Limitation or Restriction Modifier would be best described as CJ - 20% - 40% Impairment.       Goals: Reviewed 12/5/2017   Functional Short Term Goals (4 weeks):   Patient will walk with a normal gait pattern. Progressing  Patient will be able to sleep with no disruptions due to pain. Progressing  Patient will have improved lower extremity mobility to allow for improved dressing and self-cares with minimal to no pain. Progressing    Functional Long Term Goals (8 weeks):   Patient will self-manage symptoms and return to prior level of function.  Progressing  Patient will be independent in their Home Exercise Program.Progressing  Patient will tolerate walking with less than 2/10 pain up to 30 minutes. Progressing  Patient will complete stairs with minimal to  no pain. Progressing  Patient will demonstrate improved strength to allow squatting and lifting activities with no difficulty completing work related tasks. Progressing  Stand on left foot without pain. Progressing      Patient participated in goal selection and understand(s) the plan of care: Yes   Patient Potential for Achieving Desired Outcome: Good       Response to Intervention:  Good response to treatment. Patient verbalized understanding of HEP.         Plan    Treatment Plan:      Frequency:   12 visits    Duration of Treatment: 8 weeks    Planned Interventions:    Home Exercise Program development  Therapeutic Exercise (ROM & Strengthening)  Therapeutic Activities  Neuromuscular Re-education  Manual Therapy  Ultrasound  E-Stim  Gait Training  Hot Packs / Cold Pack Modalities  Vasopneumatic Compression with Cold Therapy ( Game Ready )  Phonophoresis with Ketoprofen  Iontophoresis with Dexamethasone      Plan for next visit:  Progress exercises as symptoms allow.  Manual therapy and modalities as indicated.     Thank you for your referral to [facility].  Please call with any questions, concerns or comments.  [phone]     The signature, of the referring medical provider, on this document indicates certification of the above prescribed plan of care and is medically necessary.

## 2018-02-13 NOTE — DISCHARGE SUMMARY
Patient Information     Patient Name MRN Sex Matt Thao [id number] Male 1980      Discharge Summaries by Franklin Martinez PT at 2018  9:19 AM     Author:  Franklin Martinez PT Service:  (none) Author Type:  PT- Physical Therapist     Filed:  2018  9:21 AM Date of Service:  2018  9:19 AM Status:  Signed     :  Franklin Martinez PT (PT- Physical Therapist)            [facility]  Outpatient PT - Discharge Summary    Patient Name: Matt May   YOB: 1980   Referring MD/Provider: Dr. So  Medical and Treatment Diagnosis: Left ankle pain, unspecified chronicity   PT Treatment Diagnosis: pain, weakness, impaired ROM, impaired gait, impaired neuromuscular control  Insurance: Work Comp  Start of Service: 17  Certification Dates: 17 to 18    Date of discharge: 2018     Dates of Service: 17   to  17  Number of visits: 9          Reason for Discharge:  Patient did not schedule additional visits after 17    Progress from Initial to Discharge (if applicable):    Comments:  See progress note on 17for status at the time of final visit.     Further Recommendations:    Patient will continue with established home exercise program  Patient will return to physician for follow-up.      Patient is discharged from Physical Therapy    Thank you for your referral to [facility].  Please call with any questions, concerns or comments.  [phone]

## 2018-02-26 ENCOUNTER — DOCUMENTATION ONLY (OUTPATIENT)
Dept: FAMILY MEDICINE | Facility: OTHER | Age: 38
End: 2018-02-26

## 2018-02-26 RX ORDER — OXYCODONE AND ACETAMINOPHEN 5; 325 MG/1; MG/1
1 TABLET ORAL EVERY 6 HOURS PRN
COMMUNITY
Start: 2017-07-21 | End: 2018-04-25

## 2018-02-26 RX ORDER — IBUPROFEN 200 MG
800 TABLET ORAL EVERY 6 HOURS PRN
COMMUNITY

## 2018-02-26 RX ORDER — OXYCODONE HYDROCHLORIDE 10 MG/1
10 TABLET ORAL EVERY 4 HOURS PRN
COMMUNITY
Start: 2017-07-11 | End: 2018-04-25

## 2018-02-26 RX ORDER — CYCLOBENZAPRINE HCL 10 MG
10 TABLET ORAL 3 TIMES DAILY
COMMUNITY
Start: 2016-11-21 | End: 2019-11-25

## 2018-04-18 ENCOUNTER — TELEPHONE (OUTPATIENT)
Dept: ONCOLOGY | Facility: OTHER | Age: 38
End: 2018-04-18

## 2018-04-18 NOTE — TELEPHONE ENCOUNTER
I do not see anything scanned in.  Abel Gan MD have you seen paperwork on this patient for a preop?   Shabana Santana LPN........................4/18/2018  10:36 AM

## 2018-04-18 NOTE — TELEPHONE ENCOUNTER
I last saw him in June.  I suspect someone has requested a preop from us, but I cannot see it.  Call him and get more details, or he can simply schedule the visit.  Abel Gan MD on 4/18/2018 at 11:25 AM

## 2018-04-18 NOTE — TELEPHONE ENCOUNTER
Called and they were wondering if patient had already had a preop op or if we had received paperwork.  Notified that we have not received paperwork and that he has not yet had preop.  They will call back if anything further is needed.   Shabana Santana LPN........................4/18/2018  11:35 AM

## 2018-04-18 NOTE — TELEPHONE ENCOUNTER
Not Bienvenido Felder MD patient. To family practice pool. Jo Ann Torres RN...........4/18/2018 9:15 AM

## 2018-04-25 ENCOUNTER — OFFICE VISIT (OUTPATIENT)
Dept: FAMILY MEDICINE | Facility: OTHER | Age: 38
End: 2018-04-25
Attending: FAMILY MEDICINE

## 2018-04-25 VITALS
OXYGEN SATURATION: 97 % | WEIGHT: 315 LBS | DIASTOLIC BLOOD PRESSURE: 72 MMHG | BODY MASS INDEX: 42.66 KG/M2 | SYSTOLIC BLOOD PRESSURE: 138 MMHG | RESPIRATION RATE: 16 BRPM | HEART RATE: 66 BPM | HEIGHT: 72 IN

## 2018-04-25 DIAGNOSIS — S93.492S: Primary | ICD-10-CM

## 2018-04-25 PROCEDURE — 99214 OFFICE O/P EST MOD 30 MIN: CPT | Performed by: FAMILY MEDICINE

## 2018-04-25 ASSESSMENT — ANXIETY QUESTIONNAIRES
GAD7 TOTAL SCORE: 0
2. NOT BEING ABLE TO STOP OR CONTROL WORRYING: NOT AT ALL
1. FEELING NERVOUS, ANXIOUS, OR ON EDGE: NOT AT ALL
3. WORRYING TOO MUCH ABOUT DIFFERENT THINGS: NOT AT ALL
5. BEING SO RESTLESS THAT IT IS HARD TO SIT STILL: NOT AT ALL
7. FEELING AFRAID AS IF SOMETHING AWFUL MIGHT HAPPEN: NOT AT ALL
IF YOU CHECKED OFF ANY PROBLEMS ON THIS QUESTIONNAIRE, HOW DIFFICULT HAVE THESE PROBLEMS MADE IT FOR YOU TO DO YOUR WORK, TAKE CARE OF THINGS AT HOME, OR GET ALONG WITH OTHER PEOPLE: NOT DIFFICULT AT ALL
6. BECOMING EASILY ANNOYED OR IRRITABLE: NOT AT ALL

## 2018-04-25 ASSESSMENT — PAIN SCALES - GENERAL: PAINLEVEL: MODERATE PAIN (4)

## 2018-04-25 ASSESSMENT — PATIENT HEALTH QUESTIONNAIRE - PHQ9: 5. POOR APPETITE OR OVEREATING: NOT AT ALL

## 2018-04-25 NOTE — MR AVS SNAPSHOT
"              After Visit Summary   4/25/2018    Matt May    MRN: [id number]           Patient Information     Date Of Birth          1980        Visit Information        Provider Department      4/25/2018 8:45 AM Abel Gan MD [facility]        Today's Diagnoses     Tear of talofibular ligament of left lower extremity, sequela    -  1    Class 3 obesity due to excess calories without serious comorbidity with body mass index (BMI) of 40.0 to 44.9 in adult (H)           Follow-ups after your visit        Follow-up notes from your care team     Return if symptoms worsen or fail to improve.      Who to contact     If you have questions or need follow up information about today's clinic visit or your schedule please contact [facility] directly at [phone].  Normal or non-critical lab and imaging results will be communicated to you by MyChart, letter or phone within 4 business days after the clinic has received the results. If you do not hear from us within 7 days, please contact the clinic through MyChart or phone. If you have a critical or abnormal lab result, we will notify you by phone as soon as possible.  Submit refill requests through [employer] or call your pharmacy and they will forward the refill request to us. Please allow 3 business days for your refill to be completed.          Additional Information About Your Visit        MyChart Information     [employer] lets you send messages to your doctor, view your test results, renew your prescriptions, schedule appointments and more. To sign up, go to [email or web address] . Click on \"Log in\" on the left side of the screen, which will take you to the Welcome page. Then click on \"Sign up Now\" on the right side of the page.     You will be asked to enter the access code listed below, as well as some personal information. Please follow the directions to create your username and password.     Your access code " is: [email or web address]  Expires: 2018  9:16 AM     Your access code will  in 90 days. If you need help or a new code, please call your [place] clinic or [phone].        Care EveryWhere ID     This is your Care EveryWhere ID. This could be used by other organizations to access your [place] medical records  [id number]        Your Vitals Were     Pulse Respirations Height Pulse Oximetry BMI (Body Mass Index)       66 16 6' (1.829 m) 97% 44.38 kg/m2        Blood Pressure from Last 3 Encounters:   18 138/72   17 160/90   11/15/17 148/90    Weight from Last 3 Encounters:   18 327 lb 3.2 oz (148.4 kg)   17 300 lb (136.1 kg)   11/15/17 300 lb (136.1 kg)              Today, you had the following     No orders found for display         Today's Medication Changes          These changes are accurate as of 18  9:16 AM.  If you have any questions, ask your nurse or doctor.               Stop taking these medicines if you haven't already. Please contact your care team if you have questions.     oxyCODONE IR 10 MG tablet   Commonly known as:  ROXICODONE   Stopped by:  Abel Gan MD           oxyCODONE-acetaminophen 5-325 MG per tablet   Commonly known as:  PERCOCET   Stopped by:  Abel Gan MD                    Primary Care Provider Office Phone # Fax #    Abel Gan [phone]       [address] [facility] [address]        Equal Access to Services     [facility] AH: Hadii claudia sterling hadasho Soomaali, waaxda luqadaha, qaybta kaalmacristi ballesteros . So [facility] [phone].    ATENCIÓN: Si habla español, tiene a franklin disposición servicios gratuitos de asistencia lingüística. Llame al [phone].    We comply with applicable federal civil rights laws and [place] laws. We do not discriminate on the basis of race, color, national origin, age, disability, sex, sexual orientation, or gender identity.            Thank you!      Thank you for choosing [facility] AND [facility]  for your care. Our goal is always to provide you with excellent care. Hearing back from our patients is one way we can continue to improve our services. Please take a few minutes to complete the written survey that you may receive in the mail after your visit with us. Thank you!             Your Updated Medication List - Protect others around you: Learn how to safely use, store and throw away your medicines at www.disposemymeds.org.          This list is accurate as of 4/25/18  9:16 AM.  Always use your most recent med list.                   Brand Name Dispense Instructions for use Diagnosis    cyclobenzaprine 10 MG tablet    FLEXERIL     Take 10 mg by mouth 3 times daily        ibuprofen 200 MG tablet    ADVIL/MOTRIN     Take 800 mg by mouth every 6 hours as needed        Walker Swivel Wheels Misc      Rolling Walker for home use.

## 2018-04-25 NOTE — NURSING NOTE
"Date of Surgery: 5/2/18   Type of Surgery: Lt ankle surgery  Surgeon: [facility]:  [facility]   Fax:     Fever/Chills or other infectious symptoms in past month: no  >10lb weight loss in past two months: no  O2 SAT: 97    Health Care Directive/Code status:  no  Hx of blood transfusions:   no  Td up to date:  no  History of VRE/MRSA:  no Date: no    Preoperative Evaluation: Obstructive Sleep Apnea screening    S: Snore -  Do you snore loudly? (louder than talking or loud enough to be heard through closed doors)no  T: Tired - Do you often feel tired, fatigued, or sleepy during the daytime?no  O: Observed - Has anyone ever observed you stop breathing during your sleep?no  P: Pressure - Do you have or are you being treated for high blood pressure?no  B: BMI - BMI greater than 35kg/m2?yes  A: Age - Age over 50 years old?no  N: Neck - Neck circumference greater than 40 cm?yes  G: Gender - Gender: Male?yes    Total number of \"YES\" responses:  3    Scoring: Low risk of GABI 0-2  At Risk of GABI: >3 High Risk of GABI: 5-8    Cinthya An 4/25/2018 8:58 AM    "

## 2018-04-25 NOTE — PROGRESS NOTES
"----------------- PREOPERATIVE EXAM ------------------  4/25/2018    SUBJECTIVE:  Matt May is a 37 year old male here for preoperative optimization.    I was asked to see Matt May by Dr. Ortho at [facility] for preoperative optimization due to obesity    Date of Surgery: 5/2/18  Type of Surgery: Outpatient  Surgeon: [facility]:  [facility]    Nursing Notes:   Cinthya An LPN  4/25/2018  9:05 AM  Signed  Date of Surgery: 5/2/18   Type of Surgery: Lt ankle surgery  Surgeon: [facility]:  [facility]   Fax:     Fever/Chills or other infectious symptoms in past month: no  >10lb weight loss in past two months: no  O2 SAT: 97    Health Care Directive/Code status:  no  Hx of blood transfusions:   no  Td up to date:  no  History of VRE/MRSA:  no Date: no    Preoperative Evaluation: Obstructive Sleep Apnea screening    S: Snore -  Do you snore loudly? (louder than talking or loud enough to be heard through closed doors)no  T: Tired - Do you often feel tired, fatigued, or sleepy during the daytime?no  O: Observed - Has anyone ever observed you stop breathing during your sleep?no  P: Pressure - Do you have or are you being treated for high blood pressure?no  B: BMI - BMI greater than 35kg/m2?yes  A: Age - Age over 50 years old?no  N: Neck - Neck circumference greater than 40 cm?yes  G: Gender - Gender: Male?yes    Total number of \"YES\" responses:  3    Scoring: Low risk of GABI 0-2  At Risk of GABI: >3 High Risk of GABI: 5-8    Cinthya An 4/25/2018 8:58 AM      HPI:  Had a significant tear of the left ankle at work, 6/23/2017.  Was treated conservatively, but still has significant pain.  Walks with a cane.  Ankle still feels unstable.  Had been using oxycodone, but stopped this 5 weeks ago or so.  Was getting itching from them.  Most recent MRI was 12/17:      There is disruption in the anterior talofibular ligament with thickening of and increased signal intensity within the posterior talofibular " ligament. Tibiofibular ligaments are disrupted. Calcaneofibular ligament is disrupted. There is also disruption of   deep fibers of the deltoid ligament. More superficial fibers are intact    Fever/Chills or other infectious symptoms in past month:  no  >10lb weight loss in past two months:  no    Patient Active Problem List    Diagnosis Date Noted     Tear of talofibular ligament of left lower extremity, sequela 04/25/2018     Priority: Medium     Adhesive capsulitis of left shoulder 11/29/2016     Priority: Medium     Tendinitis of left rotator cuff 11/28/2016     Priority: Medium     Backache 01/11/2012     Priority: Medium     Pain in joint, lower leg 01/11/2012     Priority: Medium     Obesity 01/11/2012     Priority: Medium     Tobacco use disorder 01/11/2012     Priority: Medium     Cannabis abuse 07/14/2010     Priority: Medium     Esophageal reflux 06/16/2010     Priority: Medium     Nonspecific reaction to tuberculin skin test without active tuberculosis 07/23/2009     Priority: Medium     Overview:   Positive Mantoux.  Negative chest x-ray.  Started on isoniazid.         Past Medical History:   Diagnosis Date     Adhesive capsulitis of left shoulder     11/29/2016     Gastro-esophageal reflux disease without esophagitis     No Comments Provided     Nonspecific reaction to tuberculin skin test without active tuberculosis     2009,negative chest x-ray. Completed isoniazid.     Other shoulder lesions, left shoulder     11/28/2016       Past Surgical History:   Procedure Laterality Date     APPENDECTOMY OPEN      2005       History reviewed. No pertinent family history.    Social History   Substance Use Topics     Smoking status: Current Every Day Smoker     Packs/day: 5.00     Types: Cigars     Smokeless tobacco: Current User     Types: Chew     Alcohol use 0.0 oz/week      Comment: Alcoholic Drinks/day: rare       Current Outpatient Prescriptions   Medication Sig Dispense Refill     cyclobenzaprine  (FLEXERIL) 10 MG tablet Take 10 mg by mouth 3 times daily       ibuprofen (ADVIL/MOTRIN) 200 MG tablet Take 800 mg by mouth every 6 hours as needed       Misc. Devices (WALKER SWIVEL WHEELS) MISC Rolling Walker for home use.         Allergies:  Allergies   Allergen Reactions     Penicillins Hives     Other reaction(s): Edema     Codeine Itching and Rash       ROS:    Surgical:  patient denies previous complications from prior surgeries including but not limited to prolonged bleeding, anesthesia complications, dysrhythmias, surgical wound infections, or prolonged hospital stay.    Denies family hx of bleeding tendencies, anesthesia complications, or other problems with surgery.     -------------------------------------------------------------    PHYSICAL EXAM:  /72 (BP Location: Right arm, Patient Position: Sitting, Cuff Size: Adult Large)  Pulse 66  Resp 16  Ht 6' (1.829 m)  Wt 327 lb 3.2 oz (148.4 kg)  SpO2 97%  BMI 44.38 kg/m2    EXAM:  General Appearance: Pleasant, alert, appropriate appearance for age. No acute distress  Head Exam: Normal. Normocephalic, atraumatic.  Eyes: PERRL, EOMI  Ears: Normal TM's bilaterally. Normal auditory canals and external ears.   OroPharynx: Normal buccal mucosa. Normal pharynx.  Neck: Supple, no masses or nodes, no lymphadenopathy.  No thyromegaly.  Lungs: Normal chest wall and respirations. Clear to auscultation, no wheezes or crackles.  Cardiovascular: Regular rate and rhythm. S1, S2, no murmurs.  Gastrointestinal: Soft, nontender, no abnormal masses or organomegaly. BS normal   Musculoskeletal: No edema.  Skin: no concerning or new rashes.  Neurologic Exam: CN 2-12 grossly intact.  Normal gait.  Symmetric DTRs, normal gross motor movement, tone, and coordination. No tremor.  Psychiatric Exam: Alert and oriented, appropriate affect.      EKG:  Not indicated  ---------------------------------------------------------------  LABS  Results for orders placed or performed  during the hospital encounter of 11/27/17   MR Ankle Left w/o Contrast    Narrative    Exam: MR ANKLE LEFT WO    History: Chronic pain of left ankle    Technique: Sagittal, coronal and axial images were obtained with combination of proton density, T2 and T1 weighted sequences.    Findings: No occult fracture or bone bruise is seen. Talar dome appears intact. There is no significant ankle joint effusion.    Achilles tendon is intact as is the plantar fascia. Other posterior lateral and posterior medial tendinous structures are intact.    There is disruption in the anterior talofibular ligament with thickening of and increased signal intensity within the posterior talofibular ligament. Tibiofibular ligaments are disrupted. Calcaneofibular ligament is disrupted. There is also disruption of   deep fibers of the deltoid ligament. More superficial fibers are intact.    No soft tissue mass is seen. There is no focal fluid collection. There is some degenerative change in the ankle joint with thinning of articular cartilage over the medial talus. No subchondral marrow edema is seen.        Impression: Evidence of previous ligamentous injury both medially and laterally. No tendon disruption or focal bone lesion.    Electronically Signed By: Vicki Mcdermott M.D. 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      PRE OP RECOMMENDATIONS:  Patient is on chronic pain medications no   Patient is on antiplatlet/anticoagulation medication no  Other medications that need adjustment perioperatively no    Other:  Patient was  advised to call our office and the surgical services with any change in condition or new symptoms if they were to develop between today and their surgical date.  Especially any cardiopulmonary symptoms or symptoms concerning for an infection.    Abel Gan 4/25/2018

## 2018-04-26 ASSESSMENT — ANXIETY QUESTIONNAIRES: GAD7 TOTAL SCORE: 0

## 2018-05-02 ENCOUNTER — TRANSFERRED RECORDS (OUTPATIENT)
Dept: HEALTH INFORMATION MANAGEMENT | Facility: OTHER | Age: 38
End: 2018-05-02

## 2018-05-08 ENCOUNTER — TELEPHONE (OUTPATIENT)
Dept: FAMILY MEDICINE | Facility: OTHER | Age: 38
End: 2018-05-08

## 2018-05-08 NOTE — TELEPHONE ENCOUNTER
Are they asking me if it is OK for him to have percocet? Or are they asking me to fill a prescription for him?  Our narcotic policy is to be seen for a fill (no phone fills) and I can work him in if needed.  Abel Gan MD on 5/8/2018 at 3:20 PM

## 2018-05-08 NOTE — TELEPHONE ENCOUNTER
Naomie from [facility] called and is wanting to give the verbal ok to fill patient's percocet rx to save him a trip to [place].  Would like a call back.     Lelia Guido on 5/8/2018 at 2:13 PM

## 2018-05-09 ENCOUNTER — TELEPHONE (OUTPATIENT)
Dept: FAMILY MEDICINE | Facility: OTHER | Age: 38
End: 2018-05-09

## 2018-05-09 NOTE — TELEPHONE ENCOUNTER
Work in to Thursday's schedule? What time would be good? Phone note from yesterday said he could be worked in?

## 2018-05-10 ENCOUNTER — OFFICE VISIT (OUTPATIENT)
Dept: FAMILY MEDICINE | Facility: OTHER | Age: 38
End: 2018-05-10
Attending: FAMILY MEDICINE
Payer: OTHER MISCELLANEOUS

## 2018-05-10 VITALS — DIASTOLIC BLOOD PRESSURE: 76 MMHG | RESPIRATION RATE: 16 BRPM | SYSTOLIC BLOOD PRESSURE: 138 MMHG

## 2018-05-10 DIAGNOSIS — S93.492S: Primary | ICD-10-CM

## 2018-05-10 PROCEDURE — 99213 OFFICE O/P EST LOW 20 MIN: CPT | Performed by: FAMILY MEDICINE

## 2018-05-10 RX ORDER — OXYCODONE AND ACETAMINOPHEN 5; 325 MG/1; MG/1
1 TABLET ORAL EVERY 6 HOURS PRN
Qty: 30 TABLET | Refills: 0 | Status: SHIPPED | OUTPATIENT
Start: 2018-05-10 | End: 2019-11-25

## 2018-05-10 ASSESSMENT — ENCOUNTER SYMPTOMS
FEVER: 0
FATIGUE: 0
ARTHRALGIAS: 1
BRUISES/BLEEDS EASILY: 0

## 2018-05-10 ASSESSMENT — PAIN SCALES - GENERAL: PAINLEVEL: EXTREME PAIN (9)

## 2018-05-10 NOTE — MR AVS SNAPSHOT
"              After Visit Summary   5/10/2018    Matt May    MRN: [id number]           Patient Information     Date Of Birth          1980        Visit Information        Provider Department      5/10/2018 2:15 PM Abel Gan MD [facility]        Today's Diagnoses     Tear of talofibular ligament of left lower extremity, sequela    -  1       Follow-ups after your visit        Follow-up notes from your care team     Return if symptoms worsen or fail to improve.      Who to contact     If you have questions or need follow up information about today's clinic visit or your schedule please contact [facility] directly at [phone].  Normal or non-critical lab and imaging results will be communicated to you by Zagsterhart, letter or phone within 4 business days after the clinic has received the results. If you do not hear from us within 7 days, please contact the clinic through Zagsterhart or phone. If you have a critical or abnormal lab result, we will notify you by phone as soon as possible.  Submit refill requests through [employer] or call your pharmacy and they will forward the refill request to us. Please allow 3 business days for your refill to be completed.          Additional Information About Your Visit        MyChart Information     [employer] lets you send messages to your doctor, view your test results, renew your prescriptions, schedule appointments and more. To sign up, go to [email or web address] . Click on \"Log in\" on the left side of the screen, which will take you to the Welcome page. Then click on \"Sign up Now\" on the right side of the page.     You will be asked to enter the access code listed below, as well as some personal information. Please follow the directions to create your username and password.     Your access code is: [email or web address]  Expires: 2018  9:16 AM     Your access code will  in 90 days. If you need help or a new code, please " call your [facility] clinic or [phone].        Care EveryWhere ID     This is your Care EveryWhere ID. This could be used by other organizations to access your [facility] medical records  [id number]        Your Vitals Were     Respirations                   16            Blood Pressure from Last 3 Encounters:   05/10/18 138/76   04/25/18 138/72   12/05/17 160/90    Weight from Last 3 Encounters:   04/25/18 327 lb 3.2 oz (148.4 kg)   12/05/17 300 lb (136.1 kg)   11/15/17 300 lb (136.1 kg)              Today, you had the following     No orders found for display         Today's Medication Changes          These changes are accurate as of 5/10/18  3:01 PM.  If you have any questions, ask your nurse or doctor.               Start taking these medicines.        Dose/Directions    oxyCODONE-acetaminophen 5-325 MG per tablet   Commonly known as:  PERCOCET   Used for:  Tear of talofibular ligament of left lower extremity, sequela   Started by:  Abel Gan MD        Dose:  1 tablet   Take 1 tablet by mouth every 6 hours as needed for pain   Quantity:  30 tablet   Refills:  0            Where to get your medicines      Some of these will need a paper prescription and others can be bought over the counter.  Ask your nurse if you have questions.     Bring a paper prescription for each of these medications     oxyCODONE-acetaminophen 5-325 MG per tablet               Information about OPIOIDS     PRESCRIPTION OPIOIDS: WHAT YOU NEED TO KNOW   You have a prescription for an opioid (narcotic) pain medicine. Opioids can cause addiction. If you have a history of chemical dependency of any type, you are at a higher risk of becoming addicted to opioids. Only take this medicine after all other options have been tried. Take it for as short a time and as few doses as possible.     Do not:    Drive. If you drive while taking these medicines, you could be arrested for driving under the influence (DUI).    Operate heavy  machinery    Do any other dangerous activities while taking these medicines.     Drink any alcohol while taking these medicines.      Take with any other medicines that contain acetaminophen. Read all labels carefully. Look for the word  acetaminophen  or  Tylenol.  Ask your pharmacist if you have questions or are unsure.    Store your pills in a secure place, locked if possible. We will not replace any lost or stolen medicine. If you don t finish your medicine, please throw away (dispose) as directed by your pharmacist. The [place] Pollution Control Agency has more information about safe disposal: https://www.pca.[facility].[place].us/living-green/managing-unwanted-medications    All opioids tend to cause constipation. Drink plenty of water and eat foods that have a lot of fiber, such as fruits, vegetables, prune juice, apple juice and high-fiber cereal. Take a laxative (Miralax, milk of magnesia, Colace, Senna) if you don t move your bowels at least every other day.          Primary Care Provider Office Phone # Fax #    Abel Gan [phone]       [address] [facility] [address]        Equal Access to Services     [facility]: Hadii claudia sterling hadashissa Socricket, waaxda luqlaz, qaybta kaalmajuliana brambila, cristi betancur. So [facility] [phone].    ATENCIÓN: Si habla español, tiene a franklin disposición servicios gratuitos de asistencia lingüística. Reji al [phone].    We comply with applicable federal civil rights laws and [place] laws. We do not discriminate on the basis of race, color, national origin, age, disability, sex, sexual orientation, or gender identity.            Thank you!     Thank you for choosing [facility] AND [facility]  for your care. Our goal is always to provide you with excellent care. Hearing back from our patients is one way we can continue to improve our services. Please take a few minutes to complete the written survey that you  may receive in the mail after your visit with us. Thank you!             Your Updated Medication List - Protect others around you: Learn how to safely use, store and throw away your medicines at www.disposemymeds.org.          This list is accurate as of 5/10/18  3:01 PM.  Always use your most recent med list.                   Brand Name Dispense Instructions for use Diagnosis    cyclobenzaprine 10 MG tablet    FLEXERIL     Take 10 mg by mouth 3 times daily        ibuprofen 200 MG tablet    ADVIL/MOTRIN     Take 800 mg by mouth every 6 hours as needed        oxyCODONE-acetaminophen 5-325 MG per tablet    PERCOCET    30 tablet    Take 1 tablet by mouth every 6 hours as needed for pain    Tear of talofibular ligament of left lower extremity, sequela       Walker Swivel Wheels Misc      Rolling Walker for home use.

## 2018-05-10 NOTE — PROGRESS NOTES
SUBJECTIVE:   Matt May is a 37 year old male who presents to clinic today for the following health issues:    HPI    Follow up on left ankle surgery.  He says he has significant ongoing pain.  Is not able to get to [place] to  more percocet and would like me to refill it.  Has 2 left. He says he was getting 5/325.  Does not drink EtOH currently.  Rates pain at 9/10.  Is elevating it.    Patient Active Problem List    Diagnosis Date Noted     Tear of talofibular ligament of left lower extremity, sequela 04/25/2018     Priority: Medium     Adhesive capsulitis of left shoulder 11/29/2016     Priority: Medium     Tendinitis of left rotator cuff 11/28/2016     Priority: Medium     Backache 01/11/2012     Priority: Medium     Pain in joint, lower leg 01/11/2012     Priority: Medium     Obesity 01/11/2012     Priority: Medium     Tobacco use disorder 01/11/2012     Priority: Medium     Cannabis abuse 07/14/2010     Priority: Medium     Esophageal reflux 06/16/2010     Priority: Medium     Nonspecific reaction to tuberculin skin test without active tuberculosis 07/23/2009     Priority: Medium     Overview:   Positive Mantoux.  Negative chest x-ray.  Started on isoniazid.       Past Surgical History:   Procedure Laterality Date     APPENDECTOMY OPEN      2005     Social History   Substance Use Topics     Smoking status: Current Every Day Smoker     Packs/day: 5.00     Types: Cigars     Smokeless tobacco: Current User     Types: Chew     Alcohol use 0.0 oz/week      Comment: Alcoholic Drinks/day: rare     Current Outpatient Prescriptions   Medication Sig Dispense Refill     cyclobenzaprine (FLEXERIL) 10 MG tablet Take 10 mg by mouth 3 times daily       ibuprofen (ADVIL/MOTRIN) 200 MG tablet Take 800 mg by mouth every 6 hours as needed       Misc. Devices (WALKER SWIVEL WHEELS) MISC Rolling Walker for home use.       Allergies   Allergen Reactions     Penicillins Hives     Other reaction(s): Edema     Codeine  Itching and Rash       Review of Systems   Constitutional: Negative for fatigue and fever.   Musculoskeletal: Positive for arthralgias.   Hematological: Does not bruise/bleed easily.        OBJECTIVE:     /76 (BP Location: Right arm, Patient Position: Sitting, Cuff Size: Adult Large)  Resp 16  There is no height or weight on file to calculate BMI.  Physical Exam   Constitutional: He is oriented to person, place, and time. He appears well-developed. No distress.   Musculoskeletal:   Left foot in a hard splint.  Toes are warm and dry with normal cap refill.  Has normal movement of all toes.   Neurological: He is alert and oriented to person, place, and time.   Skin: He is not diaphoretic.       Diagnostic Test Results:  none     ASSESSMENT/PLAN:         (M60.686S) Tear of talofibular ligament of left lower extremity, sequela  (primary encounter diagnosis)  Comment: post op pain  Plan: oxyCODONE-acetaminophen (PERCOCET) 5-325 MG per        tablet        I gave him #30 of the above meds.  If needs a refill, has to follow up in person, no phone refills.  Appears to have expected post operative pain.        Abel Gan MD  [facility] AND [facility]

## 2018-05-10 NOTE — TELEPHONE ENCOUNTER
Caroline Gutierrez. Appointment made for 2:15 today per Dr. Gan.  Meg Warren CMA..............5/10/2018........8:25 AM

## 2018-05-31 DIAGNOSIS — S93.422D TEAR OF DELTOID LIGAMENT OF ANKLE, LEFT, SUBSEQUENT ENCOUNTER: Primary | ICD-10-CM

## 2018-06-15 ENCOUNTER — HOSPITAL ENCOUNTER (OUTPATIENT)
Dept: PHYSICAL THERAPY | Facility: OTHER | Age: 38
Setting detail: THERAPIES SERIES
End: 2018-06-15
Attending: FAMILY MEDICINE
Payer: OTHER MISCELLANEOUS

## 2018-06-15 PROCEDURE — 97161 PT EVAL LOW COMPLEX 20 MIN: CPT | Mod: GP

## 2018-06-15 PROCEDURE — 40000185 ZZHC STATISTIC PT OUTPT VISIT

## 2018-06-15 PROCEDURE — 97110 THERAPEUTIC EXERCISES: CPT | Mod: GP

## 2018-06-15 NOTE — PROGRESS NOTES
"   06/15/18 1200   General Information   Type of Visit Initial OP Ortho PT Evaluation   Start of Care Date 06/15/18   Referring Physician Dr. Rehman    Patient/Family Goals Statement Improve strength and mobility   Orders Evaluate and Treat   Orders Comment eval and treat,Gait training in ASO, ankle ROM, no passive eversion 2x/week for 4 weeks   Date of Order 05/31/18   Insurance Type Other  (Work Comp )   Insurance Comments/Visits Authorized Eval and 12 visits authorized    Medical Diagnosis s/p open repain of left ankle anterior talofibular and calcaneofibular ligaments   Surgical/Medical history reviewed Yes   Body Part(s)   Body Part(s) Ankle/Foot   Presentation and Etiology   Pertinent history of current problem (include personal factors and/or comorbidities that impact the POC) Patient s/p open repain of left ankle anterior talofibular and calcaneofibular ligaments on 5/2/18.  He has been gradually progressing gait with a cane in the CAM boot.  He has been compliant with ankle ROM including ABC exercise.     Impairments B. Decreased WB tolerance;C. Swelling;D. Decreased ROM;E. Decreased flexibility;F. Decreased strength and endurance;G. Impaired balance;H. Impaired gait   Functional Limitations perform activities of daily living;perform required work activities;perform desired leisure / sports activities   Symptom Location Anterior and lateral left ankle    How/Where did it occur With a fall   Onset date of current episode/exacerbation (Date if injury was 6/23/17.  Date of surgery was 5/2/18)   Chronicity New   Pain rating (0-10 point scale) Best (/10);Worst (/10)   Best (/10) 1/10   Worst (/10) 3/10   Pain quality C. Aching;E. Shooting;F. Stabbing;G. Cramping   Frequency of pain/symptoms B. Intermittent   Pain/symptoms are: The same all the time   Pain/symptoms exacerbated by M. Other  (If he \"steps wrong\" or \"moves it wrong\" )   Prior Level of Function   Prior Level of Function-Mobility No limitations    Prior " Level of Function-ADLs No limitations    Current Level of Function   Current Community Support Family/friend caregiver   Patient role/employment history (Works at [employer])   Fall Risk Screen   Fall screen completed by PT   Have you fallen 2 or more times in the past year? No   Have you fallen and had an injury in the past year? Yes   Is patient a fall risk? No   Fall screen comments Patient as not fallen since his injury a year ago.    Ankle/Foot Objective Findings   Observation Mild swelling noted over the lateral ankle    Integumentary Healed incision site.  No redness and drainage   Gait/Locomotion Ambulates with CAM boot on left LE and uses a single point cane.  Patient states he was told by the nurse to use the cane on the same side as his surgical leg.    Ankle/Foot ROM Comment Left ankle DF is lacking 3 degrees from neutral.  Left ankle PF=40   Planned Therapy Interventions   Planned Therapy Interventions gait training;neuromuscular re-education;ROM;strengthening   Planned Modality Interventions   Planned Modality Interventions Cryotherapy   Clinical Impression   Criteria for Skilled Therapeutic Interventions Met yes, treatment indicated   PT Diagnosis Impaired mobility    Influenced by the following impairments Impaired gait, impaired balance, impaired ROM, weakness   Functional limitations due to impairments Difficulty walking, Unable to work at this time.    Clinical Presentation Stable/Uncomplicated   Clinical Presentation Rationale patient is prorgressing appropriately at this time    Clinical Decision Making (Complexity) Low complexity   Therapy Frequency 2 times/Week   Predicted Duration of Therapy Intervention (days/wks) 4-8 weeks    Risk & Benefits of therapy have been explained Yes   Patient, Family & other staff in agreement with plan of care Yes   Education Assessment   Preferred Learning Style Listening;Demonstration;Pictures/video   Barriers to Learning No barriers   ORTHO GOALS   PT  Ortho Eval Goals 1;2;3   Ortho Goal 1   Goal Identifier ROM   Goal Description Demonstrate 10 degrees of left ankle dorsiflexion to allow a normal gait pattern   Target Date 07/27/18   Ortho Goal 2   Goal Identifier Weakness    Goal Description Demonstrate appropriate strength and neuromuscular control to allow walking with compensation    Target Date 08/10/18   Ortho Goal 3   Goal Identifier mobility    Goal Description Demomstrate the ability to squat and  items from the ground   Target Date 08/10/18   Total Evaluation Time   Total Evaluation Time 30

## 2018-06-18 ENCOUNTER — OFFICE VISIT (OUTPATIENT)
Dept: ORTHOPEDICS | Facility: OTHER | Age: 38
End: 2018-06-18
Attending: ORTHOPAEDIC SURGERY
Payer: OTHER MISCELLANEOUS

## 2018-06-18 DIAGNOSIS — M25.372 LEFT ANKLE INSTABILITY: Primary | ICD-10-CM

## 2018-06-18 ASSESSMENT — PAIN SCALES - GENERAL: PAINLEVEL: MILD PAIN (3)

## 2018-06-18 NOTE — MR AVS SNAPSHOT
After Visit Summary   6/18/2018    Matt May    MRN: [id number]           Patient Information     Date Of Birth          1980        Visit Information        Provider Department      6/18/2018 2:30 PM Salinas Rehman MD [facility]        Today's Diagnoses     Left ankle instability    -  1       Follow-ups after your visit        Your next 10 appointments already scheduled     Jun 28, 2018 11:15 AM CDT   [facility]   [facility]    [address] [address]   [phone]            Jun 29, 2018 11:15 AM CDT   Treatment with Franklin A Orstad, PT   [facility]    [address] [address]   [phone]            Jul 03, 2018 11:15 AM CDT   Treatment with Franklin A Orstad, PT   [facility]    [address] [address]   [phone]            Jul 05, 2018  4:15 PM CDT   Treatment with Franklin A Orstad, PT   [facility]    [address] [address]   [phone]            Jul 10, 2018 11:15 AM CDT   Treatment with Franklin A Orstad, PT   [facility]    [address] [address]   [phone]            Jul 12, 2018 10:30 AM CDT   Treatment with Franklin A Orstad, PT   [facility]    [address] [address]   [phone]            Jul 13, 2018 11:15 AM CDT   Treatment with Franklin A Orstad, PT   [facility]    [address] [address]   [phone]              Who to contact     If you have questions or need follow up information about  today's clinic visit or your schedule please contact [facility] directly at [phone].  Normal or non-critical lab and imaging results will be communicated to you by MyChart, letter or phone within 4 business days after the clinic has received the results. If you do not hear from us within 7 days, please contact the clinic through MyChart or phone. If you have a critical or abnormal lab result, we will notify you by phone as soon as possible.  Submit refill requests through [employer] or call your pharmacy and they will forward the refill request to us. Please allow 3 business days for your refill to be completed.          Additional Information About Your Visit        Care EveryWhere ID     This is your Care EveryWhere ID. This could be used by other organizations to access your [facility] medical records  [id number]         Blood Pressure from Last 3 Encounters:   05/10/18 138/76   04/25/18 138/72   12/05/17 160/90    Weight from Last 3 Encounters:   04/25/18 327 lb 3.2 oz (148.4 kg)   12/05/17 300 lb (136.1 kg)   11/15/17 300 lb (136.1 kg)              Today, you had the following     No orders found for display       Primary Care Provider Office Phone # Fax #    Abel Gan [phone]       [address] [facility] [address]        Equal Access to Services     [facility] : Hadii claudia ku hadasho Socricket, waaxda luqadaha, qaybta kaalmada kosta, cristi betancur. So [facility] [phone].    ATENCIÓN: Si habla español, tiene a franklin disposición servicios gratuitos de asistencia lingüística. Reji al [phone].    We comply with applicable federal civil rights laws and [place] laws. We do not discriminate on the basis of race, color, national origin, age, disability, sex, sexual orientation, or gender identity.            Thank you!     Thank you for choosing [facility]  for your care. Our goal is always to provide  you with excellent care. Hearing back from our patients is one way we can continue to improve our services. Please take a few minutes to complete the written survey that you may receive in the mail after your visit with us. Thank you!             Your Updated Medication List - Protect others around you: Learn how to safely use, store and throw away your medicines at www.disposemymeds.org.          This list is accurate as of 6/18/18 11:59 PM.  Always use your most recent med list.                   Brand Name Dispense Instructions for use Diagnosis    cyclobenzaprine 10 MG tablet    FLEXERIL     Take 10 mg by mouth 3 times daily        ibuprofen 200 MG tablet    ADVIL/MOTRIN     Take 800 mg by mouth every 6 hours as needed        oxyCODONE-acetaminophen 5-325 MG per tablet    PERCOCET    30 tablet    Take 1 tablet by mouth every 6 hours as needed for pain    Tear of talofibular ligament of left lower extremity, sequela       VALIUM PO      Take 5 mg by mouth every 12 hours as needed for anxiety        Walker Swivel Wheels Misc      Rolling Walker for home use.

## 2018-06-19 ENCOUNTER — HOSPITAL ENCOUNTER (OUTPATIENT)
Dept: PHYSICAL THERAPY | Facility: OTHER | Age: 38
Setting detail: THERAPIES SERIES
End: 2018-06-19
Attending: FAMILY MEDICINE
Payer: OTHER MISCELLANEOUS

## 2018-06-19 ENCOUNTER — MEDICAL CORRESPONDENCE (OUTPATIENT)
Dept: HEALTH INFORMATION MANAGEMENT | Facility: OTHER | Age: 38
End: 2018-06-19

## 2018-06-19 PROCEDURE — 97116 GAIT TRAINING THERAPY: CPT | Mod: GP

## 2018-06-19 PROCEDURE — 40000185 ZZHC STATISTIC PT OUTPT VISIT

## 2018-06-19 PROCEDURE — 97110 THERAPEUTIC EXERCISES: CPT | Mod: GP

## 2018-06-22 ENCOUNTER — HOSPITAL ENCOUNTER (OUTPATIENT)
Dept: PHYSICAL THERAPY | Facility: OTHER | Age: 38
Setting detail: THERAPIES SERIES
End: 2018-06-22
Attending: FAMILY MEDICINE
Payer: OTHER MISCELLANEOUS

## 2018-06-22 PROCEDURE — 40000185 ZZHC STATISTIC PT OUTPT VISIT

## 2018-06-22 PROCEDURE — 97116 GAIT TRAINING THERAPY: CPT | Mod: GP

## 2018-06-22 PROCEDURE — 97110 THERAPEUTIC EXERCISES: CPT | Mod: GP

## 2018-06-22 PROCEDURE — 97016 VASOPNEUMATIC DEVICE THERAPY: CPT | Mod: GP

## 2018-06-26 NOTE — PROGRESS NOTES
HPI:   This 37-year-old gentleman is now nearly 7 weeks' status post left ankle arthroscopy with debridement, and open lateral ankle reconstruction (Brostrom) with internal brace of 05/02/2018. He was last seen by Maty on 05/25, at the 4-week bolivar, with progression to weightbearing as tolerated in a boot with physical therapy at the 5-week bolivar. He is now nearly 7 weeks' out, but unfortunately, he has not had any physical therapy. He only met with his therapist for the first time last week. He has been weightbearing in his boot. He is using a cane, though typically in that ipsilateral hand. He has used a cane since the time of his original injury. He denies using a cane prior to then. He denies any calf pain, chest pain, or shortness of breath beyond his baseline. He has continued to take Xarelto as he typically smokes 4-6 cigars per day. He does note that he continues to have pain with weightbearing on the lateral ankle, though far improved from preoperatively. He was able to walk around [place] for 15 minutes yesterday while in the boot with his cane. He is pleased with this, though he was sore afterwards. He has been doing range of motion on his own.     Vital Signs   Ht 73, Wt 300, BMI 39.58.     Examination   General Examination:  On exam, his incisions are well healed. He does still have some tenderness over the surgical site laterally. He has no tenderness to his calf. He is able to strongly fire tib ant and gastrocsoleus, 4/5. His peroneals and posterior tib are notably weaker, 3/5 peroneals, 2/5 posterior tib. This is improved with coaching. Sensation intact to light touch in all nerve distributions without paresthesias. DP and PT pulse 2+. Calf is soft and nontender.       Assessments     1. Left ankle instability - M25.372 (Primary)     2. Encounter for other orthopedic aftercare - Z47.89     Treatment   1. Left ankle instability   Referral To:[facility]  Reason:s/p status  post left ankle diagnostic arthroscopy, intra-articular debridement, open Brostrom with internal brace on 05/02/2018Pt-unrestricted gait training in ASO, pregressing to strengthening as tolerated with therabands include HEP.       2. Others   Notes: ASSESSMENT: Nearly 7 weeks' status post left ankle arthroscopy and open Brostrom, doing well overall, though notably under rehabilitated at this point. PLAN: 1. All the above was discussed at length. Given that he has not yet had any physical therapy, we will continue his boot for still one additional week. He has his ASO brace. In 1 week, he will wean out of the boot into the ASO. We will update his physical therapy today and he has this scheduled tomorrow. This is for unrestricted physical therapy, with diligent range of motion, gait training in his ASO, progressing to strengthening as tolerated. The importance of now progressing was discussed and he is in full agreement with this. 2. Return to clinic in 2-3 weeks with Maty for check to be sure he is progressing with therapy. He should add stationary bike and swimming. Elliptical in the ASO if he tolerates it. 3. Return to clinic to see me 5-6 weeks from now. Potential for return to work part-time at that time. He does understand that if there are any questions whatsoever with physical therapy, I am readily able and would like to clarify those at any point. 4. When he transitions out of his boot, he will transition from Xarelto to b.i.d. aspirin. He is in agreement with all this.

## 2018-06-28 ENCOUNTER — HOSPITAL ENCOUNTER (OUTPATIENT)
Dept: PHYSICAL THERAPY | Facility: OTHER | Age: 38
Setting detail: THERAPIES SERIES
End: 2018-06-28
Attending: FAMILY MEDICINE
Payer: OTHER MISCELLANEOUS

## 2018-06-28 PROCEDURE — 97110 THERAPEUTIC EXERCISES: CPT | Mod: GP

## 2018-06-28 PROCEDURE — 97116 GAIT TRAINING THERAPY: CPT | Mod: GP

## 2018-06-28 PROCEDURE — 40000185 ZZHC STATISTIC PT OUTPT VISIT

## 2018-07-03 ENCOUNTER — HOSPITAL ENCOUNTER (OUTPATIENT)
Dept: PHYSICAL THERAPY | Facility: OTHER | Age: 38
Setting detail: THERAPIES SERIES
End: 2018-07-03
Attending: FAMILY MEDICINE
Payer: OTHER MISCELLANEOUS

## 2018-07-03 PROCEDURE — 40000185 ZZHC STATISTIC PT OUTPT VISIT

## 2018-07-03 PROCEDURE — 97530 THERAPEUTIC ACTIVITIES: CPT | Mod: GP

## 2018-07-03 PROCEDURE — 97110 THERAPEUTIC EXERCISES: CPT | Mod: GP

## 2018-07-03 PROCEDURE — 97116 GAIT TRAINING THERAPY: CPT | Mod: GP

## 2018-07-05 ENCOUNTER — HOSPITAL ENCOUNTER (OUTPATIENT)
Dept: PHYSICAL THERAPY | Facility: OTHER | Age: 38
Setting detail: THERAPIES SERIES
End: 2018-07-05
Attending: FAMILY MEDICINE
Payer: OTHER MISCELLANEOUS

## 2018-07-05 PROCEDURE — 97116 GAIT TRAINING THERAPY: CPT | Mod: GP

## 2018-07-05 PROCEDURE — 40000185 ZZHC STATISTIC PT OUTPT VISIT

## 2018-07-05 PROCEDURE — 97110 THERAPEUTIC EXERCISES: CPT | Mod: GP

## 2018-07-12 ENCOUNTER — HOSPITAL ENCOUNTER (OUTPATIENT)
Dept: PHYSICAL THERAPY | Facility: OTHER | Age: 38
Setting detail: THERAPIES SERIES
End: 2018-07-12
Attending: FAMILY MEDICINE
Payer: OTHER MISCELLANEOUS

## 2018-07-12 PROCEDURE — 97016 VASOPNEUMATIC DEVICE THERAPY: CPT | Mod: GP

## 2018-07-12 PROCEDURE — 40000185 ZZHC STATISTIC PT OUTPT VISIT

## 2018-07-12 PROCEDURE — 97110 THERAPEUTIC EXERCISES: CPT | Mod: GP

## 2018-07-13 ENCOUNTER — HOSPITAL ENCOUNTER (OUTPATIENT)
Dept: PHYSICAL THERAPY | Facility: OTHER | Age: 38
Setting detail: THERAPIES SERIES
End: 2018-07-13
Attending: FAMILY MEDICINE
Payer: OTHER MISCELLANEOUS

## 2018-07-13 PROCEDURE — 40000185 ZZHC STATISTIC PT OUTPT VISIT

## 2018-07-13 PROCEDURE — 97016 VASOPNEUMATIC DEVICE THERAPY: CPT | Mod: GP

## 2018-07-13 PROCEDURE — 97110 THERAPEUTIC EXERCISES: CPT | Mod: GP

## 2018-07-26 ENCOUNTER — HOSPITAL ENCOUNTER (OUTPATIENT)
Dept: PHYSICAL THERAPY | Facility: OTHER | Age: 38
Setting detail: THERAPIES SERIES
End: 2018-07-26
Attending: FAMILY MEDICINE
Payer: OTHER MISCELLANEOUS

## 2018-07-26 PROCEDURE — 97110 THERAPEUTIC EXERCISES: CPT | Mod: GP

## 2018-07-26 PROCEDURE — 40000185 ZZHC STATISTIC PT OUTPT VISIT

## 2018-08-07 ENCOUNTER — HOSPITAL ENCOUNTER (OUTPATIENT)
Dept: PHYSICAL THERAPY | Facility: OTHER | Age: 38
Setting detail: THERAPIES SERIES
End: 2018-08-07
Attending: FAMILY MEDICINE
Payer: OTHER MISCELLANEOUS

## 2018-08-07 PROCEDURE — 97110 THERAPEUTIC EXERCISES: CPT | Mod: GP

## 2018-08-07 PROCEDURE — 40000185 ZZHC STATISTIC PT OUTPT VISIT

## 2018-08-10 ENCOUNTER — HOSPITAL ENCOUNTER (OUTPATIENT)
Dept: PHYSICAL THERAPY | Facility: OTHER | Age: 38
Setting detail: THERAPIES SERIES
End: 2018-08-10
Attending: FAMILY MEDICINE
Payer: OTHER MISCELLANEOUS

## 2018-08-10 PROCEDURE — 40000185 ZZHC STATISTIC PT OUTPT VISIT

## 2018-08-10 PROCEDURE — 97110 THERAPEUTIC EXERCISES: CPT | Mod: GP

## 2018-08-13 ENCOUNTER — HOSPITAL ENCOUNTER (OUTPATIENT)
Dept: PHYSICAL THERAPY | Facility: OTHER | Age: 38
Setting detail: THERAPIES SERIES
End: 2018-08-13
Attending: FAMILY MEDICINE
Payer: OTHER MISCELLANEOUS

## 2018-08-13 PROCEDURE — 40000185 ZZHC STATISTIC PT OUTPT VISIT

## 2018-08-13 PROCEDURE — 97110 THERAPEUTIC EXERCISES: CPT | Mod: GP

## 2018-08-15 ENCOUNTER — HOSPITAL ENCOUNTER (OUTPATIENT)
Dept: PHYSICAL THERAPY | Facility: OTHER | Age: 38
Setting detail: THERAPIES SERIES
End: 2018-08-15
Attending: FAMILY MEDICINE
Payer: OTHER MISCELLANEOUS

## 2018-08-15 PROCEDURE — 97110 THERAPEUTIC EXERCISES: CPT | Mod: GP

## 2018-08-15 PROCEDURE — 40000185 ZZHC STATISTIC PT OUTPT VISIT

## 2018-08-17 ENCOUNTER — HOSPITAL ENCOUNTER (OUTPATIENT)
Dept: PHYSICAL THERAPY | Facility: OTHER | Age: 38
Setting detail: THERAPIES SERIES
End: 2018-08-17
Attending: FAMILY MEDICINE
Payer: OTHER MISCELLANEOUS

## 2018-08-17 PROCEDURE — 97110 THERAPEUTIC EXERCISES: CPT | Mod: GP

## 2018-08-17 PROCEDURE — 40000185 ZZHC STATISTIC PT OUTPT VISIT

## 2018-08-20 ENCOUNTER — HOSPITAL ENCOUNTER (OUTPATIENT)
Dept: PHYSICAL THERAPY | Facility: OTHER | Age: 38
Setting detail: THERAPIES SERIES
End: 2018-08-20
Attending: FAMILY MEDICINE
Payer: OTHER MISCELLANEOUS

## 2018-08-20 PROCEDURE — 40000185 ZZHC STATISTIC PT OUTPT VISIT

## 2018-08-20 PROCEDURE — 97110 THERAPEUTIC EXERCISES: CPT | Mod: GP

## 2018-08-20 NOTE — PROGRESS NOTES
Outpatient Physical Therapy Progress Note     Patient: Matt May  : 1980    Beginning/End Dates of Reporting Period:  6/15/18 to 2018    Referring Provider: Dr. Rehman    Therapy Diagnosis: s/p open repain of left ankle anterior talofibular and calcaneofibular ligaments     Client Self Report: Patient states he has weaned from the cane.  He is riding a pedal bike now.  He is pleased with current progress.      Objective Measurements:  Objective Measure: Left ankle pain   Details: 0/10 at rest.         Goals:  Goal Identifier ROM   Goal Description Demonstrate 10 degrees of left ankle dorsiflexion to allow a normal gait pattern   Target Date 18   Date Met      Progress:   Progressing      Goal Identifier Weakness    Goal Description Demonstrate appropriate strength and neuromuscular control to allow walking with compensation    Target Date 08/10/18   Date Met      Progress: Progressing      Goal Identifier mobility    Goal Description Demomstrate the ability to squat and  items from the ground   Target Date 08/10/18   Date Met      Progress: Met        Progress Toward Goals:   Progress this reporting period: Patient is making gradual progress with strength, gait and proprioception       Plan:  Continue therapy per current plan of care.

## 2018-08-20 NOTE — ADDENDUM NOTE
Encounter addended by: Franklin Martinez, PT on: 8/20/2018  8:02 AM<BR>     Actions taken: Flowsheet accepted, Sign clinical note

## 2018-08-22 ENCOUNTER — HOSPITAL ENCOUNTER (OUTPATIENT)
Dept: PHYSICAL THERAPY | Facility: OTHER | Age: 38
Setting detail: THERAPIES SERIES
End: 2018-08-22
Attending: FAMILY MEDICINE
Payer: OTHER MISCELLANEOUS

## 2018-08-22 PROCEDURE — 97110 THERAPEUTIC EXERCISES: CPT | Mod: GP

## 2018-08-22 PROCEDURE — 40000185 ZZHC STATISTIC PT OUTPT VISIT

## 2018-08-27 ENCOUNTER — HOSPITAL ENCOUNTER (OUTPATIENT)
Dept: PHYSICAL THERAPY | Facility: OTHER | Age: 38
Setting detail: THERAPIES SERIES
End: 2018-08-27
Attending: FAMILY MEDICINE
Payer: OTHER MISCELLANEOUS

## 2018-08-27 PROCEDURE — 97110 THERAPEUTIC EXERCISES: CPT | Mod: GP

## 2018-08-27 PROCEDURE — 40000185 ZZHC STATISTIC PT OUTPT VISIT

## 2018-09-04 ENCOUNTER — HOSPITAL ENCOUNTER (OUTPATIENT)
Dept: PHYSICAL THERAPY | Facility: OTHER | Age: 38
Setting detail: THERAPIES SERIES
End: 2018-09-04
Attending: FAMILY MEDICINE
Payer: OTHER MISCELLANEOUS

## 2018-09-04 PROCEDURE — 40000185 ZZHC STATISTIC PT OUTPT VISIT

## 2018-09-04 PROCEDURE — 97110 THERAPEUTIC EXERCISES: CPT | Mod: GP

## 2018-09-06 ENCOUNTER — HOSPITAL ENCOUNTER (OUTPATIENT)
Dept: PHYSICAL THERAPY | Facility: OTHER | Age: 38
Setting detail: THERAPIES SERIES
End: 2018-09-06
Attending: FAMILY MEDICINE
Payer: OTHER MISCELLANEOUS

## 2018-09-06 PROCEDURE — 40000185 ZZHC STATISTIC PT OUTPT VISIT

## 2018-09-06 PROCEDURE — 97110 THERAPEUTIC EXERCISES: CPT | Mod: GP

## 2018-09-06 NOTE — PROGRESS NOTES
Outpatient Physical Therapy Progress Note     Patient: Matt May  : 1980    Beginning/End Dates of Reporting Period:  18 to 2018    Referring Provider: Dr. Rehman    Therapy Diagnosis: s/p open repain of left ankle anterior talofibular and calcaneofibular ligaments     Client Self Report: Patient reports 80-90% improved overall.  He is pleased with current progress.  He reports single leg balance on the left remains limited. He has pain when he bends down to reach and  items from the floor. States the left leg will cramp at times. He has a follow up with Dr. Rehman on 18.      Objective Measurements:  Objective Measure: Left ankle pain   Details: 1/10 at rest.      Objective Measure: Left Ankle dorsiflexion ROM   Details: 7 degrees    Objective Measure: Left ankle plantarflexion ROM   Details: 45 degrees       Goals:  Goal Identifier ROM   Goal Description Demonstrate 10 degrees of left ankle dorsiflexion to allow a normal gait pattern   Target Date 18   Date Met      Progress: Progressing      Goal Identifier Weakness    Goal Description Demonstrate appropriate strength and neuromuscular control to allow walking with compensation    Target Date 08/10/18   Date Met      Progress: Progressing,  Mild compensated gait when walking with ASO brace on.      Goal Identifier mobility    Goal Description Demomstrate the ability to squat and  items from the ground   Target Date 08/10/18   Date Met  18   Progress: Patient is performing floor to waist lifts with 40#, 3 sets of 10 reps       Progress Toward Goals:   Progress this reporting period: Patient is making continued progress with ROM, strength, gait and neuromuscular control.        Plan:  Continue therapy per current plan of care.

## 2018-09-12 ENCOUNTER — HOSPITAL ENCOUNTER (OUTPATIENT)
Dept: PHYSICAL THERAPY | Facility: OTHER | Age: 38
Setting detail: THERAPIES SERIES
End: 2018-09-12
Attending: FAMILY MEDICINE
Payer: OTHER MISCELLANEOUS

## 2018-09-12 PROCEDURE — 97110 THERAPEUTIC EXERCISES: CPT | Mod: GP

## 2018-09-12 PROCEDURE — 40000185 ZZHC STATISTIC PT OUTPT VISIT

## 2018-09-25 ENCOUNTER — HOSPITAL ENCOUNTER (OUTPATIENT)
Dept: PHYSICAL THERAPY | Facility: OTHER | Age: 38
Setting detail: THERAPIES SERIES
End: 2018-09-25
Attending: FAMILY MEDICINE
Payer: OTHER MISCELLANEOUS

## 2018-09-25 PROCEDURE — 40000185 ZZHC STATISTIC PT OUTPT VISIT

## 2018-09-25 PROCEDURE — 97110 THERAPEUTIC EXERCISES: CPT | Mod: GP

## 2018-09-27 ENCOUNTER — HOSPITAL ENCOUNTER (OUTPATIENT)
Dept: PHYSICAL THERAPY | Facility: OTHER | Age: 38
Setting detail: THERAPIES SERIES
End: 2018-09-27
Attending: FAMILY MEDICINE
Payer: OTHER MISCELLANEOUS

## 2018-09-27 PROCEDURE — 97110 THERAPEUTIC EXERCISES: CPT | Mod: GP

## 2018-09-27 PROCEDURE — 40000185 ZZHC STATISTIC PT OUTPT VISIT

## 2018-10-04 ENCOUNTER — HOSPITAL ENCOUNTER (OUTPATIENT)
Dept: PHYSICAL THERAPY | Facility: OTHER | Age: 38
Setting detail: THERAPIES SERIES
End: 2018-10-04
Attending: FAMILY MEDICINE
Payer: OTHER MISCELLANEOUS

## 2018-10-04 PROCEDURE — 97110 THERAPEUTIC EXERCISES: CPT | Mod: GP

## 2018-10-04 PROCEDURE — 40000185 ZZHC STATISTIC PT OUTPT VISIT

## 2018-10-08 DIAGNOSIS — M25.372 LEFT ANKLE INSTABILITY: Primary | ICD-10-CM

## 2018-10-11 ENCOUNTER — HOSPITAL ENCOUNTER (OUTPATIENT)
Dept: PHYSICAL THERAPY | Facility: OTHER | Age: 38
Setting detail: THERAPIES SERIES
End: 2018-10-11
Attending: FAMILY MEDICINE
Payer: OTHER MISCELLANEOUS

## 2018-10-11 PROCEDURE — 40000185 ZZHC STATISTIC PT OUTPT VISIT

## 2018-10-11 PROCEDURE — 97110 THERAPEUTIC EXERCISES: CPT | Mod: GP

## 2018-10-24 ENCOUNTER — HOSPITAL ENCOUNTER (OUTPATIENT)
Dept: PHYSICAL THERAPY | Facility: OTHER | Age: 38
Setting detail: THERAPIES SERIES
End: 2018-10-24
Attending: FAMILY MEDICINE
Payer: OTHER MISCELLANEOUS

## 2018-10-24 ENCOUNTER — TRANSFERRED RECORDS (OUTPATIENT)
Dept: PHYSICAL THERAPY | Facility: OTHER | Age: 38
End: 2018-10-24

## 2018-10-24 PROCEDURE — 40000185 ZZHC STATISTIC PT OUTPT VISIT

## 2018-10-24 PROCEDURE — 97110 THERAPEUTIC EXERCISES: CPT | Mod: GP

## 2018-11-07 ENCOUNTER — HOSPITAL ENCOUNTER (OUTPATIENT)
Dept: PHYSICAL THERAPY | Facility: OTHER | Age: 38
Setting detail: THERAPIES SERIES
End: 2018-11-07
Attending: FAMILY MEDICINE
Payer: OTHER MISCELLANEOUS

## 2018-11-07 PROCEDURE — 97110 THERAPEUTIC EXERCISES: CPT | Mod: GP

## 2018-11-07 PROCEDURE — 40000185 ZZHC STATISTIC PT OUTPT VISIT

## 2018-11-12 ENCOUNTER — HOSPITAL ENCOUNTER (OUTPATIENT)
Dept: PHYSICAL THERAPY | Facility: OTHER | Age: 38
Setting detail: THERAPIES SERIES
End: 2018-11-12
Attending: FAMILY MEDICINE
Payer: OTHER MISCELLANEOUS

## 2018-11-12 PROCEDURE — 97110 THERAPEUTIC EXERCISES: CPT | Mod: GP

## 2018-11-12 PROCEDURE — 40000185 ZZHC STATISTIC PT OUTPT VISIT

## 2018-11-14 ENCOUNTER — HOSPITAL ENCOUNTER (OUTPATIENT)
Dept: PHYSICAL THERAPY | Facility: OTHER | Age: 38
Setting detail: THERAPIES SERIES
End: 2018-11-14
Attending: FAMILY MEDICINE
Payer: OTHER MISCELLANEOUS

## 2018-11-14 PROCEDURE — 40000185 ZZHC STATISTIC PT OUTPT VISIT

## 2018-11-14 PROCEDURE — 97035 APP MDLTY 1+ULTRASOUND EA 15: CPT | Mod: GP

## 2018-11-14 PROCEDURE — 97110 THERAPEUTIC EXERCISES: CPT | Mod: GP

## 2018-11-20 ENCOUNTER — HOSPITAL ENCOUNTER (OUTPATIENT)
Dept: PHYSICAL THERAPY | Facility: OTHER | Age: 38
Setting detail: THERAPIES SERIES
End: 2018-11-20
Attending: FAMILY MEDICINE
Payer: OTHER MISCELLANEOUS

## 2018-11-20 PROCEDURE — 40000185 ZZHC STATISTIC PT OUTPT VISIT

## 2018-11-20 PROCEDURE — 97110 THERAPEUTIC EXERCISES: CPT | Mod: GP

## 2018-11-28 ENCOUNTER — HOSPITAL ENCOUNTER (OUTPATIENT)
Dept: PHYSICAL THERAPY | Facility: OTHER | Age: 38
Setting detail: THERAPIES SERIES
End: 2018-11-28
Attending: FAMILY MEDICINE
Payer: OTHER MISCELLANEOUS

## 2018-11-28 PROCEDURE — 40000185 ZZHC STATISTIC PT OUTPT VISIT

## 2018-11-28 PROCEDURE — 97110 THERAPEUTIC EXERCISES: CPT | Mod: GP

## 2018-12-05 ENCOUNTER — HOSPITAL ENCOUNTER (OUTPATIENT)
Dept: PHYSICAL THERAPY | Facility: OTHER | Age: 38
Setting detail: THERAPIES SERIES
End: 2018-12-05
Attending: FAMILY MEDICINE
Payer: OTHER MISCELLANEOUS

## 2018-12-05 PROCEDURE — 40000185 ZZHC STATISTIC PT OUTPT VISIT

## 2018-12-05 PROCEDURE — 97110 THERAPEUTIC EXERCISES: CPT | Mod: GP

## 2018-12-11 ENCOUNTER — HOSPITAL ENCOUNTER (OUTPATIENT)
Dept: PHYSICAL THERAPY | Facility: OTHER | Age: 38
Setting detail: THERAPIES SERIES
End: 2018-12-11
Attending: FAMILY MEDICINE
Payer: OTHER MISCELLANEOUS

## 2018-12-11 PROCEDURE — 97110 THERAPEUTIC EXERCISES: CPT | Mod: GP

## 2018-12-11 PROCEDURE — 40000185 ZZHC STATISTIC PT OUTPT VISIT

## 2018-12-13 ENCOUNTER — HOSPITAL ENCOUNTER (OUTPATIENT)
Dept: PHYSICAL THERAPY | Facility: OTHER | Age: 38
Setting detail: THERAPIES SERIES
End: 2018-12-13
Attending: FAMILY MEDICINE
Payer: OTHER MISCELLANEOUS

## 2018-12-13 PROCEDURE — 97110 THERAPEUTIC EXERCISES: CPT | Mod: GP

## 2018-12-18 ENCOUNTER — HOSPITAL ENCOUNTER (OUTPATIENT)
Dept: PHYSICAL THERAPY | Facility: OTHER | Age: 38
Setting detail: THERAPIES SERIES
End: 2018-12-18
Attending: FAMILY MEDICINE
Payer: OTHER MISCELLANEOUS

## 2018-12-18 PROCEDURE — 97110 THERAPEUTIC EXERCISES: CPT | Mod: GP

## 2019-01-31 NOTE — ADDENDUM NOTE
Encounter addended by: Franklin Martinez, PT on: 1/31/2019 3:00 PM   Actions taken: Sign clinical note, Episode resolved

## 2019-01-31 NOTE — PROGRESS NOTES
"Outpatient Physical Therapy Discharge Note     Patient: Matt May  : 1980    Beginning/End Dates of Reporting Period:  18 to 18    Referring Provider: Dr. Rehman     Therapy Diagnosis: s/p open repain of left ankle anterior talofibular and calcaneofibular ligaments     Client Self Report on 18: Patient states the left ankle \"feels great\".  He is pleased with current progress.      Patient had a no show visit on 18. No additional visits were scheduled.     Objective Measurements: Not completed due to unplanned discharge     Outcome Measures (most recent score): Not completed due to unplanned discharge     Goals:  Goal Identifier ROM   Goal Description Demonstrate 10 degrees of left ankle dorsiflexion to allow a normal gait pattern   Target Date 18   Date Met      Progress:  Met      Goal Identifier Weakness    Goal Description Demonstrate appropriate strength and neuromuscular control to allow walking with compensation    Target Date 08/10/18   Date Met      Progress: Progressing, patient had mild gait compensation at time of final visit.      Goal Identifier mobility    Goal Description Demomstrate the ability to squat and  items from the ground   Target Date 08/10/18   Date Met  18   Progress: Met          Progress Toward Goals:   Progress this reporting period: Patient was making good progress towards goals at time of final visit.     Plan:  Discharge from therapy.    Discharge:    Reason for Discharge: Patient progressed to independent management     Discharge Plan: Patient to continue home program.  "

## 2019-11-22 ENCOUNTER — APPOINTMENT (OUTPATIENT)
Dept: GENERAL RADIOLOGY | Facility: OTHER | Age: 39
End: 2019-11-22
Attending: EMERGENCY MEDICINE
Payer: OTHER MISCELLANEOUS

## 2019-11-22 ENCOUNTER — HOSPITAL ENCOUNTER (EMERGENCY)
Facility: OTHER | Age: 39
Discharge: HOME | End: 2019-11-22
Attending: EMERGENCY MEDICINE | Admitting: EMERGENCY MEDICINE
Payer: OTHER MISCELLANEOUS

## 2019-11-22 VITALS
WEIGHT: 315 LBS | SYSTOLIC BLOOD PRESSURE: 139 MMHG | BODY MASS INDEX: 44.35 KG/M2 | TEMPERATURE: 98.4 F | HEART RATE: 87 BPM | OXYGEN SATURATION: 96 % | RESPIRATION RATE: 18 BRPM | DIASTOLIC BLOOD PRESSURE: 103 MMHG

## 2019-11-22 DIAGNOSIS — M25.572 PAIN IN JOINT, ANKLE AND FOOT, LEFT: ICD-10-CM

## 2019-11-22 PROCEDURE — 25000132 ZZH RX MED GY IP 250 OP 250 PS 637: Performed by: EMERGENCY MEDICINE

## 2019-11-22 PROCEDURE — 99283 EMERGENCY DEPT VISIT LOW MDM: CPT | Performed by: EMERGENCY MEDICINE

## 2019-11-22 PROCEDURE — 73610 X-RAY EXAM OF ANKLE: CPT | Mod: LT

## 2019-11-22 PROCEDURE — 99283 EMERGENCY DEPT VISIT LOW MDM: CPT | Mod: Z6 | Performed by: EMERGENCY MEDICINE

## 2019-11-22 RX ORDER — IBUPROFEN 400 MG/1
800 TABLET, FILM COATED ORAL ONCE
Status: COMPLETED | OUTPATIENT
Start: 2019-11-22 | End: 2019-11-22

## 2019-11-22 RX ADMIN — IBUPROFEN 800 MG: 400 TABLET ORAL at 08:14

## 2019-11-22 NOTE — ED TRIAGE NOTES
Patient presents to the ED with complaints of post op foot pain.  Patient states he had surgery on his L) foot 2 years ago and now feels like the pins are not in place.  Patient states he has experienced pain since surgery, however, it is mild and usually subsides, however, pain returned yesterday and patient states he woke up this AM with intractable pain and swelling.  Patient states he used Biofreeze which normally helps with no relief.

## 2019-11-22 NOTE — DISCHARGE INSTRUCTIONS
Take ibuprofen for pain as directed on the  label.  Where your brace and use crutches for comfort  Elevate your ankle as much as possible

## 2019-11-22 NOTE — ED AVS SNAPSHOT
[facility]  [address]  [place] [address]  Phone:  [phone]  Fax:  [phone]                                    Matt May   MRN: [id number]    Department:  [facility]   Date of Visit:  11/22/2019           After Visit Summary Signature Page    I have received my discharge instructions, and my questions have been answered. I have discussed any challenges I see with this plan with the nurse or doctor.    ..........................................................................................................................................  Patient/Patient Representative Signature      ..........................................................................................................................................  Patient Representative Print Name and Relationship to Patient    ..................................................               ................................................  Date                                   Time    ..........................................................................................................................................  Reviewed by Signature/Title    ...................................................              ..............................................  Date                                               Time          22EPIC Rev 08/18

## 2019-11-22 NOTE — ED PROVIDER NOTES
[facility]  Emergency Department Visit Note    Foot Pain      History of Present Illness     HPI:  Matt May is a 39 year old male presenting with left ankle pain. These symptoms started 1 days ago. The patient injured this ankel while at work. He had surgery several years ago and thinks a screw is loose. That patient is able to walk on this foot. There is no numbness, tingling, or weakness. The patient denies additional injury and there is no pain in the ankle, knee, or hip.     Medications:  Prior to Admission medications    Medication Sig Last Dose Taking? Auth Provider   DiazePAM (VALIUM PO) Take 5 mg by mouth every 12 hours as needed for anxiety Past Month at Unknown time Yes Reported, Patient   ibuprofen (ADVIL/MOTRIN) 200 MG tablet Take 800 mg by mouth every 6 hours as needed Past Week at Unknown time Yes Reported, Patient   oxyCODONE-acetaminophen (PERCOCET) 5-325 MG per tablet Take 1 tablet by mouth every 6 hours as needed for pain Past Month at Unknown time Yes Abel Gan MD   cyclobenzaprine (FLEXERIL) 10 MG tablet Take 10 mg by mouth 3 times daily More than a month at Unknown time  Reported, Patient       Allergies:  Allergies   Allergen Reactions     Penicillins Hives     Other reaction(s): Edema     Codeine Itching and Rash       Problem List:  Patient Active Problem List   Diagnosis     Adhesive capsulitis of left shoulder     Backache     Cannabis abuse     Esophageal reflux     Pain in joint, lower leg     Nonspecific reaction to tuberculin skin test without active tuberculosis     Obesity     Tendinitis of left rotator cuff     Tobacco use disorder     Tear of talofibular ligament of left lower extremity, sequela       Past Medical History:  Past Medical History:   Diagnosis Date     Adhesive capsulitis of left shoulder     11/29/2016     Gastro-esophageal reflux disease without esophagitis     No Comments Provided     Nonspecific reaction to tuberculin skin test  without active tuberculosis     2009,negative chest x-ray. Completed isoniazid.     Other shoulder lesions, left shoulder     11/28/2016       Past Surgical History:  Past Surgical History:   Procedure Laterality Date     APPENDECTOMY OPEN      2005       Social History:  Social History     Tobacco Use     Smoking status: Current Every Day Smoker     Packs/day: 5.00     Types: Cigars     Smokeless tobacco: Current User     Types: Chew   Substance Use Topics     Alcohol use: Yes     Alcohol/week: 0.0 standard drinks     Comment: Alcoholic Drinks/day: rare     Drug use: Unknown     Types: Marijuana, Other     Comment: Drug use: Yes       Review of Systems:  10 point review of systems obtained and pertinent positive and negative findings noted in HPI. Review of systems otherwise negative.      Physical Exam     Vital signs: BP (!) 139/103   Pulse 87   Temp 98.4  F (36.9  C) (Tympanic)   Resp 18   Wt 148.3 kg (327 lb)   SpO2 96%   BMI 44.35 kg/m      Physical Exam:  General: awake and alert, comfortable while lying in bed  HEENT: atraumatic, no scleral injection, no nasal discharge, neck supple  Extremities:  Left foot is nontender without focal tenderness over the fifth metatarsal and without focal tenderness over the navicular, without deformity or open wounds, the ankle is tender over distal fibula without swelling. a pain with passive range of motion of the foot, there is no tenderness of the proximal fibula.   Neuro: alert and oriented x 3, moving extremities x 4, sensation intact in all toes, dorsal and volar feet, and medial and lateral ankles to light touch, wiggles all toes with strength grossly intact, formal strength testing of the affected foot is limited by pain      Medical Decision Making & ED Course     Matt May is a 39 year old male presenting with left ankle pain. Differential includes fracture, sprain, dislocation, subluxation, contusion. Given the patient's exam findings, an x-ray of the  ankle was indicated to evaluate the hardware and there is no evidence of hardware in the xray or any other fracture or dislocation.  This evaluation is most suggestive of an soft tissue foot sprain. The patient was placed in an ankle brace. The patient was not able to ambulate comfortably in the emergency department and did require crutches. Follow up with Dr Rehman for further evaluation and management. Patient given instructions on follow-up and warning signs for which to return to ED. All questions were answered and the patient is comfortable with plan for discharge. The patient was discharged in stable condition.    Diagnosis & Disposition     Diagnosis:  1. Pain in joint, ankle and foot, left        Disposition:  Home    MD Mathew Oliver Theresa M, MD  11/22/19 [id number]

## 2019-11-25 ENCOUNTER — OFFICE VISIT (OUTPATIENT)
Dept: FAMILY MEDICINE | Facility: OTHER | Age: 39
End: 2019-11-25
Attending: FAMILY MEDICINE
Payer: OTHER MISCELLANEOUS

## 2019-11-25 VITALS
DIASTOLIC BLOOD PRESSURE: 92 MMHG | TEMPERATURE: 98.6 F | OXYGEN SATURATION: 96 % | SYSTOLIC BLOOD PRESSURE: 148 MMHG | RESPIRATION RATE: 16 BRPM | HEART RATE: 81 BPM | BODY MASS INDEX: 40.65 KG/M2 | HEIGHT: 73 IN | WEIGHT: 306.7 LBS

## 2019-11-25 DIAGNOSIS — M25.572 ACUTE LEFT ANKLE PAIN: Primary | ICD-10-CM

## 2019-11-25 PROCEDURE — 99213 OFFICE O/P EST LOW 20 MIN: CPT | Performed by: FAMILY MEDICINE

## 2019-11-25 ASSESSMENT — MIFFLIN-ST. JEOR: SCORE: 2347.12

## 2019-11-25 ASSESSMENT — ENCOUNTER SYMPTOMS
FATIGUE: 0
WOUND: 0
ARTHRALGIAS: 0

## 2019-11-25 ASSESSMENT — PAIN SCALES - GENERAL: PAINLEVEL: MILD PAIN (2)

## 2019-11-25 NOTE — PROGRESS NOTES
"  SUBJECTIVE:   Matt May is a 39 year old male who presents to clinic today for the following health issues:    HPI  Emergency department follow up.  Last Thursday he was working outside, had slipped on ice but no known injury to the left ankle.  Had a prior work related trauma to the left ankle, including surgery.  Was seen in the emergency department, x-ray was normal.  The hardware is non metallic.  Was given crutches and told to follow up.  He now has no pain at all.  He has spoken with his orthopedist and told to follow up in a week with him only if has symptoms.  Since he has none at all, wants a release.      Past Surgical History:   Procedure Laterality Date     APPENDECTOMY OPEN      2005     Current Outpatient Medications   Medication Sig Dispense Refill     ibuprofen (ADVIL/MOTRIN) 200 MG tablet Take 800 mg by mouth every 6 hours as needed       Allergies   Allergen Reactions     Penicillins Hives     Other reaction(s): Edema     Codeine Itching and Rash       Review of Systems   Constitutional: Negative for fatigue.   Musculoskeletal: Negative for arthralgias.   Skin: Negative for wound.        OBJECTIVE:     BP (!) 148/92 (BP Location: Right arm, Patient Position: Sitting, Cuff Size: Adult Large)   Pulse 81   Temp 98.6  F (37  C) (Tympanic)   Resp 16   Ht 1.842 m (6' 0.5\")   Wt 139.1 kg (306 lb 11.2 oz)   SpO2 96%   BMI 41.02 kg/m    Body mass index is 41.02 kg/m .  Physical Exam  Constitutional:       Appearance: Normal appearance.   Musculoskeletal:      Comments: Left ankle with full range of motion.  No pain on palpation    Neurological:      General: No focal deficit present.      Mental Status: He is alert and oriented to person, place, and time.   Psychiatric:         Mood and Affect: Mood normal.         Behavior: Behavior normal.         Diagnostic Test Results:  none     ASSESSMENT/PLAN:       (M25.572) Acute left ankle pain  (primary encounter diagnosis)  Comment: this is now " resolved  Given significant prior trauma, ligamentous instability is possible, or even a loose body.  The symptoms resolved very quickly, so simply follow up as needed   Plan:        Abel Gan MD  [facility] [place]

## 2019-11-25 NOTE — NURSING NOTE
"Needs a note to go back to work    Chief Complaint   Patient presents with     Letter for School/Work     coming in for a letter to go back to work       Initial BP (!) 148/92 (BP Location: Right arm, Patient Position: Sitting, Cuff Size: Adult Large)   Pulse 81   Temp 98.6  F (37  C) (Tympanic)   Resp 16   Ht 1.842 m (6' 0.5\")   Wt 139.1 kg (306 lb 11.2 oz)   SpO2 96%   BMI 41.02 kg/m   Estimated body mass index is 41.02 kg/m  as calculated from the following:    Height as of this encounter: 1.842 m (6' 0.5\").    Weight as of this encounter: 139.1 kg (306 lb 11.2 oz).  Medication Reconciliation: complete    Cinthya An LPN  "

## 2020-10-12 ENCOUNTER — VIRTUAL VISIT (OUTPATIENT)
Dept: FAMILY MEDICINE | Facility: OTHER | Age: 40
End: 2020-10-12
Payer: OTHER GOVERNMENT

## 2020-10-12 ENCOUNTER — ALLIED HEALTH/NURSE VISIT (OUTPATIENT)
Dept: FAMILY MEDICINE | Facility: OTHER | Age: 40
End: 2020-10-12
Attending: NURSE PRACTITIONER
Payer: OTHER GOVERNMENT

## 2020-10-12 DIAGNOSIS — Z20.828 CONTACT WITH OR EXPOSURE TO VIRAL DISEASE: Primary | ICD-10-CM

## 2020-10-12 DIAGNOSIS — Z20.822 ENCOUNTER FOR LABORATORY TESTING FOR COVID-19 VIRUS: Primary | ICD-10-CM

## 2020-10-12 DIAGNOSIS — Z20.822 EXPOSURE TO COVID-19 VIRUS: ICD-10-CM

## 2020-10-12 PROCEDURE — C9803 HOPD COVID-19 SPEC COLLECT: HCPCS

## 2020-10-12 PROCEDURE — U0003 INFECTIOUS AGENT DETECTION BY NUCLEIC ACID (DNA OR RNA); SEVERE ACUTE RESPIRATORY SYNDROME CORONAVIRUS 2 (SARS-COV-2) (CORONAVIRUS DISEASE [COVID-19]), AMPLIFIED PROBE TECHNIQUE, MAKING USE OF HIGH THROUGHPUT TECHNOLOGIES AS DESCRIBED BY CMS-2020-01-R: HCPCS | Mod: ZL | Performed by: NURSE PRACTITIONER

## 2020-10-12 PROCEDURE — 99207 PR NO CHARGE NURSE ONLY: CPT

## 2020-10-12 PROCEDURE — 99212 OFFICE O/P EST SF 10 MIN: CPT | Mod: 95 | Performed by: NURSE PRACTITIONER

## 2020-10-12 NOTE — PROGRESS NOTES
"Matt May is a 40 year old male who is being evaluated via a billable telephone visit.      The patient has been notified of following:     \"This telephone visit will be conducted via a call between you and your physician/provider. We have found that certain health care needs can be provided without the need for a physical exam.  This service lets us provide the care you need with a short phone conversation.  If a prescription is necessary we can send it directly to your pharmacy.  If lab work is needed we can place an order for that and you can then stop by our lab to have the test done at a later time.    Telephone visits are billed at different rates depending on your insurance coverage. During this emergency period, for some insurers they may be billed the same as an in-person visit.  Please reach out to your insurance provider with any questions.    If during the course of the call the physician/provider feels a telephone visit is not appropriate, you will not be charged for this service.\"    Patient has given verbal consent for Telephone visit?  Yes    What phone number would you like to be contacted at? [phone]    How would you like to obtain your AVS? Mail a copy    Subjective     Matt May is a 40 year old male who presents via phone visit today for the following health issues:  covid testing      HPI  States his wife testing positive, notified of results this morning.  Wife so far only has a sore throat.  States he has no idea where the exposure came from but assuming work.    Suspected COVID exposure: yes  Known COVID exposure: yes   Fevers or chills: chills started today, no fevers  Nasal congestion or drainage: No  Cough: yes - chronic from tobacco use  Chest tightness or heaviness: No  Shortness of breath: No  Sore throat: No  Nausea or vomiting: No  Diarrhea: No  Loss of taste or smell: No  Headaches: No  Body aches: No  Fatigue: No  Previous covid test: NO        Objective    "       Vitals:  No vitals were obtained today due to virtual visit.    healthy, alert, no distress and cooperative  PSYCH: Alert and oriented times 3; coherent speech, normal   rate and volume, able to articulate logical thoughts, able   to abstract reason, no tangential thoughts, no hallucinations   or delusions  His affect is normal and pleasant  RESP: No cough, no audible wheezing, able to talk in full sentences  Remainder of exam unable to be completed due to telephone visits        Assessment & Plan     Encounter for laboratory testing for COVID-19 virus    - Symptomatic COVID-19 Virus (Coronavirus) by PCR; Future  - Symptomatic COVID-19 Virus (Coronavirus) by PCR    Exposure to COVID-19 virus    - Symptomatic COVID-19 Virus (Coronavirus) by PCR; Future  - Symptomatic COVID-19 Virus (Coronavirus) by PCR      Come to clinic for curbside COVID-19 test, phone number provided and process discussed.    Self quarantine until test results are available and continue if positive.    Instructed to limit movements outside of home as much as possible, social distance, mask in public spaces, and monitor closely for COVID-19 symptoms      Discussed warning signs/symptoms indicative of need to f/u  Come to be seen in clinic if worsening or concerns      Neris Houston NP  [facility]    Phone call duration:  5 minutes

## 2020-10-14 ENCOUNTER — TELEPHONE (OUTPATIENT)
Dept: FAMILY MEDICINE | Facility: OTHER | Age: 40
End: 2020-10-14

## 2020-10-14 ENCOUNTER — TELEPHONE (OUTPATIENT)
Dept: EMERGENCY MEDICINE | Facility: CLINIC | Age: 40
End: 2020-10-14

## 2020-10-14 LAB
SARS-COV-2 RNA SPEC QL NAA+PROBE: ABNORMAL
SPECIMEN SOURCE: ABNORMAL

## 2020-10-14 NOTE — TELEPHONE ENCOUNTER
"Coronavirus (COVID-19) Notification    Caller Name (Patient, parent, daughter/son, grandparent, etc)  Matt May    Reason for call  Notify of Positive Coronavirus (COVID-19) lab results, assess symptoms,  review [facility] recommendations    Lab Result    Lab test:  2019-nCoV rRt-PCR or SARS-CoV-2 PCR    Oropharyngeal AND/OR nasopharyngeal swabs is POSITIVE for 2019-nCoV RNA/SARS-COV-2 PCR (COVID-19 virus)    RN Recommendations/Instructions per [facility] Coronavirus COVID-19 recommendations    Brief introduction script  Introduce self then review script:  \"I am calling on behalf of [employer].  We were notified that your Coronavirus test (COVID-19) for was POSITIVE for the virus.  I have some information to relay to you but first I wanted to mention that the [place] Dept of Health will be contacting you shortly [it's possible MD already called Patient] to talk to you more about how you are feeling and other people you have had contact with who might now also have the virus.  Also, [facility] is Partnering with the [facility] for Covid-19 research, you may be contacted directly by research staff.\"    Assessment (Inquire about Patient's current symptoms)   Assessment   Current Symptoms at time of phone call: (if no symptoms, document No symptoms] Body aches, cough, dizziness, headache and eyes hurt, diarrhea   Symptoms onset (if applicable) 10/11/20     If at time of call, Patients symptoms hare worsened, the Patient should contact 911 or have someone drive them to Emergency Dept promptly:      If Patient calling 911, inform 911 personal that you have tested positive for the Coronavirus (COVID-19).  Place mask on and await 911 to arrive.    If Emergency Dept, If possible, please have another adult drive you to the Emergency Dept but you need to wear mask when in contact with other people.      Review information with Patient    Your result was positive. This means you have COVID-19 " (coronavirus).  We have sent you a letter that reviews the information that I'll be reviewing with you now.    How can I protect others?    If you have symptoms: stay home and away from others (self-isolate) until:    You've had no fever--and no medicine that reduces fever--for 1 full day (24 hours). And       Your other symptoms have gotten better. For example, your cough or breathing has improved. And     At least 10 days have passed since your symptoms started. (If you've been told by a doctor that you have a weak immune system, wait 20 days.)     If you don't have symptoms: Stay home and away from others (self-isolate) until at least 10 days have passed since your first positive COVID-19 test. (Date test collected)    During this time:    Stay in your own room, including for meals. Use your own bathroom if you can.    Stay away from others in your home. No hugging, kissing or shaking hands. No visitors.     Don't go to work, school or anywhere else.     Clean  high touch  surfaces often (doorknobs, counters, handles, etc.). Use a household cleaning spray or wipes. You'll find a full list on the EPA website at www.epa.gov/pesticide-registration/list-n-disinfectants-use-against-sars-cov-2.     Cover your mouth and nose with a mask, tissue or other face covering to avoid spreading germs.    Wash your hands and face often with soap and water.    Caregivers in these groups are at risk for severe illness due to COVID-19:  o People 65 years and older  o People who live in a nursing home or long-term care facility  o People with chronic disease (lung, heart, cancer, diabetes, kidney, liver, immunologic)  o People who have a weakened immune system, including those who:  - Are in cancer treatment  - Take medicine that weakens the immune system, such as corticosteroids  - Had a bone marrow or organ transplant  - Have an immune deficiency  - Have poorly controlled HIV or AIDS  - Are obese (body mass index of 40 or  higher)  - Smoke regularly    Caregivers should wear gloves while washing dishes, handling laundry and cleaning bedrooms and bathrooms.    Wash and dry laundry with special caution. Don't shake dirty laundry, and use the warmest water setting you can.    If you have a weakened immune system, ask your doctor about other actions you should take.    For more tips, go to www.cdc.gov/coronavirus/2019-ncov/downloads/10Things.pdf.    You should not go back to work until you meet the guidelines above for ending your home isolation. You don't need to be retested for COVID-19 before going back to work--studies show that you won't spread the virus if it's been at least 10 days since your symptoms started (or 20 days, if you have a weak immune system).    Employers: This document serves as formal notice of your employee's medical guidelines for going back to work. They must meet the above guidelines before going back to work in person.    How can I take care of myself?    1. Get lots of rest. Drink extra fluids (unless a doctor has told you not to).    2. Take Tylenol (acetaminophen) for fever or pain. If you have liver or kidney problems, ask your family doctor if it's okay to take Tylenol.     Take either:     650 mg (two 325 mg pills) every 4 to 6 hours, or     1,000 mg (two 500 mg pills) every 8 hours as needed.     Note: Don't take more than 3,000 mg in one day. Acetaminophen is found in many medicines (both prescribed and over-the-counter medicines). Read all labels to be sure you don't take too much.    For children, check the Tylenol bottle for the right dose (based on their age or weight).    3. If you have other health problems (like cancer, heart failure, an organ transplant or severe kidney disease): Call your specialty clinic if you don't feel better in the next 2 days.    4. Know when to call 911: Emergency warning signs include:    Trouble breathing or shortness of breath    Pain or pressure in the chest that  doesn't go away    Feeling confused like you haven't felt before, or not being able to wake up    Bluish-colored lips or face    5. Sign up for [employer]. We know it's scary to hear that you have COVID-19. We want to track your symptoms to make sure you're okay over the next 2 weeks. Please look for an email from [employer]--this is a free, online program that we'll use to keep in touch. To sign up, follow the link in the email. Learn more at [email or web address].    Where can I get more information?    [facility]: [email or web address]    Coronavirus Basics: www.health.[facility].[place]/diseases/coronavirus/basics.html    What to Do If You're Sick: www.cdc.gov/coronavirus/2019-ncov/about/steps-when-sick.html    Ending Home Isolation: www.cdc.gov/coronavirus/2019-ncov/hcp/disposition-in-home-patients.html     Caring for Someone with COVID-19: www.cdc.gov/coronavirus/2019-ncov/if-you-are-sick/care-for-someone.html     [facility] clinical trials (COVID-19 research studies): clinicalaffairs.[facility]/n-clinical-trials     A Positive COVID-19 letter will be sent via [employer] or the mail. (Exception, no letters sent to Presurgerical/Preprocedure Patients)    [Name]  Kiersten Hernandes LPN

## 2020-10-14 NOTE — TELEPHONE ENCOUNTER
"Coronavirus (COVID-19) Notification    Caller Name (Patient, parent, daughter/son, grandparent, etc)  Patient     Reason for call  Notify of Positive Coronavirus (COVID-19) lab results, assess symptoms,  review [facility] recommendations    Lab Result    Lab test:  2019-nCoV rRt-PCR or SARS-CoV-2 PCR    Oropharyngeal AND/OR nasopharyngeal swabs is POSITIVE for 2019-nCoV RNA/SARS-COV-2 PCR (COVID-19 virus)    RN Recommendations/Instructions per [facility] Coronavirus COVID-19 recommendations    Brief introduction script  Introduce self then review script:  \"I am calling on behalf of [email or web address].  We were notified that your Coronavirus test (COVID-19) for was POSITIVE for the virus.  I have some information to relay to you but first I wanted to mention that the [place] Dept of Health will be contacting you shortly [it's possible MD already called Patient] to talk to you more about how you are feeling and other people you have had contact with who might now also have the virus.  Also, [facility] is Partnering with the [facility] for Covid-19 research, you may be contacted directly by research staff.\"    Assessment (Inquire about Patient's current symptoms)   Assessment   Current Symptoms at time of phone call: (if no symptoms, document No symptoms]  body aches, congestion,  Fever, no smell and taste   Symptoms onset (if applicable)  10/11/20     If at time of call, Patients symptoms hare worsened, the Patient should contact 911 or have someone drive them to Emergency Dept promptly:      If Patient calling 911, inform 911 personal that you have tested positive for the Coronavirus (COVID-19).  Place mask on and await 911 to arrive.    If Emergency Dept, If possible, please have another adult drive you to the Emergency Dept but you need to wear mask when in contact with other people.      Review information with Patient    Your result was positive. This means you have COVID-19 (coronavirus).  " We have sent you a letter that reviews the information that I'll be reviewing with you now.    How can I protect others?    If you have symptoms: stay home and away from others (self-isolate) until:    You've had no fever--and no medicine that reduces fever--for 1 full day (24 hours). And       Your other symptoms have gotten better. For example, your cough or breathing has improved. And     At least 10 days have passed since your symptoms started. (If you've been told by a doctor that you have a weak immune system, wait 20 days.)     If you don't have symptoms: Stay home and away from others (self-isolate) until at least 10 days have passed since your first positive COVID-19 test. (Date test collected)    During this time:    Stay in your own room, including for meals. Use your own bathroom if you can.    Stay away from others in your home. No hugging, kissing or shaking hands. No visitors.     Don't go to work, school or anywhere else.     Clean  high touch  surfaces often (doorknobs, counters, handles, etc.). Use a household cleaning spray or wipes. You'll find a full list on the EPA website at www.epa.gov/pesticide-registration/list-n-disinfectants-use-against-sars-cov-2.     Cover your mouth and nose with a mask, tissue or other face covering to avoid spreading germs.    Wash your hands and face often with soap and water.    Caregivers in these groups are at risk for severe illness due to COVID-19:  o People 65 years and older  o People who live in a nursing home or long-term care facility  o People with chronic disease (lung, heart, cancer, diabetes, kidney, liver, immunologic)  o People who have a weakened immune system, including those who:  - Are in cancer treatment  - Take medicine that weakens the immune system, such as corticosteroids  - Had a bone marrow or organ transplant  - Have an immune deficiency  - Have poorly controlled HIV or AIDS  - Are obese (body mass index of 40 or higher)  - Smoke  regularly    Caregivers should wear gloves while washing dishes, handling laundry and cleaning bedrooms and bathrooms.    Wash and dry laundry with special caution. Don't shake dirty laundry, and use the warmest water setting you can.    If you have a weakened immune system, ask your doctor about other actions you should take.    For more tips, go to www.cdc.gov/coronavirus/2019-ncov/downloads/10Things.pdf.    You should not go back to work until you meet the guidelines above for ending your home isolation. You don't need to be retested for COVID-19 before going back to work--studies show that you won't spread the virus if it's been at least 10 days since your symptoms started (or 20 days, if you have a weak immune system).    Employers: This document serves as formal notice of your employee's medical guidelines for going back to work. They must meet the above guidelines before going back to work in person.    How can I take care of myself?    1. Get lots of rest. Drink extra fluids (unless a doctor has told you not to).    2. Take Tylenol (acetaminophen) for fever or pain. If you have liver or kidney problems, ask your family doctor if it's okay to take Tylenol.     Take either:     650 mg (two 325 mg pills) every 4 to 6 hours, or     1,000 mg (two 500 mg pills) every 8 hours as needed.     Note: Don't take more than 3,000 mg in one day. Acetaminophen is found in many medicines (both prescribed and over-the-counter medicines). Read all labels to be sure you don't take too much.    For children, check the Tylenol bottle for the right dose (based on their age or weight).    3. If you have other health problems (like cancer, heart failure, an organ transplant or severe kidney disease): Call your specialty clinic if you don't feel better in the next 2 days.    4. Know when to call 911: Emergency warning signs include:    Trouble breathing or shortness of breath    Pain or pressure in the chest that doesn't go  away    Feeling confused like you haven't felt before, or not being able to wake up    Bluish-colored lips or face    5. Sign up for [employer]. We know it's scary to hear that you have COVID-19. We want to track your symptoms to make sure you're okay over the next 2 weeks. Please look for an email from [employer]--this is a free, online program that we'll use to keep in touch. To sign up, follow the link in the email. Learn more at [email or web address].    Where can I get more information?    [facility]: [email or web address]    Coronavirus Basics: www.health.[facility].[place]./diseases/coronavirus/basics.html    What to Do If You're Sick: www.cdc.gov/coronavirus/2019-ncov/about/steps-when-sick.html    Ending Home Isolation: www.cdc.gov/coronavirus/2019-ncov/hcp/disposition-in-home-patients.html     Caring for Someone with COVID-19: www.cdc.gov/coronavirus/2019-ncov/if-you-are-sick/care-for-someone.html     [facility] clinical trials (COVID-19 research studies): clinicalaffairs.[facility]-clinical-trials     A Positive COVID-19 letter will be sent via [employer] or the mail. (Exception, no letters sent to Presurgerical/Preprocedure Patients)    [Name]  Xiomy Young RN  [facility]  COVID19 Results Team RN  Ph# [phone]

## 2021-12-23 ENCOUNTER — APPOINTMENT (OUTPATIENT)
Dept: GENERAL RADIOLOGY | Facility: OTHER | Age: 41
End: 2021-12-23
Attending: EMERGENCY MEDICINE
Payer: MEDICAID

## 2021-12-23 ENCOUNTER — HOSPITAL ENCOUNTER (EMERGENCY)
Facility: OTHER | Age: 41
Discharge: HOME | End: 2021-12-23
Attending: EMERGENCY MEDICINE | Admitting: EMERGENCY MEDICINE
Payer: MEDICAID

## 2021-12-23 ENCOUNTER — TELEPHONE (OUTPATIENT)
Dept: FAMILY MEDICINE | Facility: OTHER | Age: 41
End: 2021-12-23

## 2021-12-23 VITALS
TEMPERATURE: 96.7 F | HEIGHT: 73 IN | HEART RATE: 67 BPM | SYSTOLIC BLOOD PRESSURE: 149 MMHG | BODY MASS INDEX: 41.75 KG/M2 | RESPIRATION RATE: 18 BRPM | OXYGEN SATURATION: 97 % | WEIGHT: 315 LBS | DIASTOLIC BLOOD PRESSURE: 106 MMHG

## 2021-12-23 DIAGNOSIS — M25.512 ACUTE PAIN OF LEFT SHOULDER: ICD-10-CM

## 2021-12-23 LAB
ALBUMIN SERPL-MCNC: 4.5 G/DL (ref 3.5–5.7)
ALP SERPL-CCNC: 49 U/L (ref 34–104)
ALT SERPL W P-5'-P-CCNC: 29 U/L (ref 7–52)
ANION GAP SERPL CALCULATED.3IONS-SCNC: 8 MMOL/L (ref 3–14)
AST SERPL W P-5'-P-CCNC: 23 U/L (ref 13–39)
ATRIAL RATE - MUSE: 68 BPM
BASOPHILS # BLD AUTO: 0 10E3/UL (ref 0–0.2)
BASOPHILS NFR BLD AUTO: 0 %
BILIRUB SERPL-MCNC: 0.3 MG/DL (ref 0.3–1)
BUN SERPL-MCNC: 15 MG/DL (ref 7–25)
CALCIUM SERPL-MCNC: 9.5 MG/DL (ref 8.6–10.3)
CHLORIDE BLD-SCNC: 104 MMOL/L (ref 98–107)
CO2 SERPL-SCNC: 26 MMOL/L (ref 21–31)
CREAT SERPL-MCNC: 0.95 MG/DL (ref 0.7–1.3)
DIASTOLIC BLOOD PRESSURE - MUSE: NORMAL MMHG
EOSINOPHIL # BLD AUTO: 0.2 10E3/UL (ref 0–0.7)
EOSINOPHIL NFR BLD AUTO: 2 %
ERYTHROCYTE [DISTWIDTH] IN BLOOD BY AUTOMATED COUNT: 12.8 % (ref 10–15)
GFR SERPL CREATININE-BSD FRML MDRD: >90 ML/MIN/1.73M2
GLUCOSE BLD-MCNC: 110 MG/DL (ref 70–105)
HCT VFR BLD AUTO: 45.1 % (ref 40–53)
HGB BLD-MCNC: 15.5 G/DL (ref 13.3–17.7)
IMM GRANULOCYTES # BLD: 0 10E3/UL
IMM GRANULOCYTES NFR BLD: 0 %
INTERPRETATION ECG - MUSE: NORMAL
LYMPHOCYTES # BLD AUTO: 3 10E3/UL (ref 0.8–5.3)
LYMPHOCYTES NFR BLD AUTO: 27 %
MCH RBC QN AUTO: 29.7 PG (ref 26.5–33)
MCHC RBC AUTO-ENTMCNC: 34.4 G/DL (ref 31.5–36.5)
MCV RBC AUTO: 86 FL (ref 78–100)
MONOCYTES # BLD AUTO: 1.2 10E3/UL (ref 0–1.3)
MONOCYTES NFR BLD AUTO: 11 %
NEUTROPHILS # BLD AUTO: 6.6 10E3/UL (ref 1.6–8.3)
NEUTROPHILS NFR BLD AUTO: 60 %
NRBC # BLD AUTO: 0 10E3/UL
NRBC BLD AUTO-RTO: 0 /100
P AXIS - MUSE: 38 DEGREES
PLATELET # BLD AUTO: 199 10E3/UL (ref 150–450)
POTASSIUM BLD-SCNC: 4 MMOL/L (ref 3.5–5.1)
PR INTERVAL - MUSE: 146 MS
PROT SERPL-MCNC: 7 G/DL (ref 6.4–8.9)
QRS DURATION - MUSE: 108 MS
QT - MUSE: 394 MS
QTC - MUSE: 418 MS
R AXIS - MUSE: 26 DEGREES
RBC # BLD AUTO: 5.22 10E6/UL (ref 4.4–5.9)
SODIUM SERPL-SCNC: 138 MMOL/L (ref 134–144)
SYSTOLIC BLOOD PRESSURE - MUSE: NORMAL MMHG
T AXIS - MUSE: 32 DEGREES
TROPONIN I SERPL-MCNC: 6.1 PG/ML (ref 0–34)
TROPONIN I SERPL-MCNC: 7.3 PG/ML (ref 0–34)
VENTRICULAR RATE- MUSE: 68 BPM
WBC # BLD AUTO: 11 10E3/UL (ref 4–11)

## 2021-12-23 PROCEDURE — 93005 ELECTROCARDIOGRAM TRACING: CPT | Performed by: EMERGENCY MEDICINE

## 2021-12-23 PROCEDURE — 84484 ASSAY OF TROPONIN QUANT: CPT | Mod: 91 | Performed by: EMERGENCY MEDICINE

## 2021-12-23 PROCEDURE — 96374 THER/PROPH/DIAG INJ IV PUSH: CPT | Mod: XU | Performed by: EMERGENCY MEDICINE

## 2021-12-23 PROCEDURE — 36415 COLL VENOUS BLD VENIPUNCTURE: CPT | Performed by: EMERGENCY MEDICINE

## 2021-12-23 PROCEDURE — 250N000013 HC RX MED GY IP 250 OP 250 PS 637: Performed by: EMERGENCY MEDICINE

## 2021-12-23 PROCEDURE — 73030 X-RAY EXAM OF SHOULDER: CPT | Mod: TC,LT

## 2021-12-23 PROCEDURE — 93010 ELECTROCARDIOGRAM REPORT: CPT | Performed by: INTERNAL MEDICINE

## 2021-12-23 PROCEDURE — 99283 EMERGENCY DEPT VISIT LOW MDM: CPT | Performed by: EMERGENCY MEDICINE

## 2021-12-23 PROCEDURE — 250N000011 HC RX IP 250 OP 636: Performed by: EMERGENCY MEDICINE

## 2021-12-23 PROCEDURE — 71046 X-RAY EXAM CHEST 2 VIEWS: CPT | Mod: TC

## 2021-12-23 PROCEDURE — 85025 COMPLETE CBC W/AUTO DIFF WBC: CPT | Performed by: EMERGENCY MEDICINE

## 2021-12-23 PROCEDURE — 93308 TTE F-UP OR LMTD: CPT | Performed by: EMERGENCY MEDICINE

## 2021-12-23 PROCEDURE — 99285 EMERGENCY DEPT VISIT HI MDM: CPT | Mod: 25 | Performed by: EMERGENCY MEDICINE

## 2021-12-23 PROCEDURE — 80053 COMPREHEN METABOLIC PANEL: CPT | Performed by: EMERGENCY MEDICINE

## 2021-12-23 RX ORDER — OXYCODONE HYDROCHLORIDE 5 MG/1
5 TABLET ORAL ONCE
Status: COMPLETED | OUTPATIENT
Start: 2021-12-23 | End: 2021-12-23

## 2021-12-23 RX ORDER — BACLOFEN 10 MG/1
10 TABLET ORAL ONCE
Status: COMPLETED | OUTPATIENT
Start: 2021-12-23 | End: 2021-12-23

## 2021-12-23 RX ORDER — BACLOFEN 10 MG/1
5 TABLET ORAL 3 TIMES DAILY PRN
Qty: 3 TABLET | Refills: 0 | Status: SHIPPED | OUTPATIENT
Start: 2021-12-23 | End: 2021-12-25

## 2021-12-23 RX ORDER — ACETAMINOPHEN 500 MG
500 TABLET ORAL ONCE
Status: COMPLETED | OUTPATIENT
Start: 2021-12-23 | End: 2021-12-23

## 2021-12-23 RX ORDER — LIDOCAINE 50 MG/G
1 PATCH TOPICAL ONCE
Status: DISCONTINUED | OUTPATIENT
Start: 2021-12-23 | End: 2021-12-23 | Stop reason: HOSPADM

## 2021-12-23 RX ORDER — OXYCODONE AND ACETAMINOPHEN 5; 325 MG/1; MG/1
1 TABLET ORAL EVERY 6 HOURS PRN
COMMUNITY
End: 2022-01-03

## 2021-12-23 RX ORDER — KETOROLAC TROMETHAMINE 15 MG/ML
15 INJECTION, SOLUTION INTRAMUSCULAR; INTRAVENOUS ONCE
Status: COMPLETED | OUTPATIENT
Start: 2021-12-23 | End: 2021-12-23

## 2021-12-23 RX ADMIN — ACETAMINOPHEN 500 MG: 500 TABLET, FILM COATED ORAL at 01:44

## 2021-12-23 RX ADMIN — BACLOFEN 10 MG: 10 TABLET ORAL at 04:56

## 2021-12-23 RX ADMIN — OXYCODONE HYDROCHLORIDE 5 MG: 5 TABLET ORAL at 03:15

## 2021-12-23 RX ADMIN — LIDOCAINE 5% 1 PATCH: 700 PATCH TOPICAL at 02:25

## 2021-12-23 RX ADMIN — OXYCODONE HYDROCHLORIDE 5 MG: 5 TABLET ORAL at 04:18

## 2021-12-23 RX ADMIN — KETOROLAC TROMETHAMINE 15 MG: 15 INJECTION, SOLUTION INTRAMUSCULAR; INTRAVENOUS at 01:42

## 2021-12-23 ASSESSMENT — MIFFLIN-ST. JEOR: SCORE: 2402.22

## 2021-12-23 NOTE — Clinical Note
Matt May was seen and treated in our emergency department on 12/23/2021.  He may return to work on 12/27/2021.       If you have any questions or concerns, please don't hesitate to call.      Rema Mahoney MD

## 2021-12-23 NOTE — TELEPHONE ENCOUNTER
TJP-Reason for call: Patient wanting a work in appointment.    Patient is having the following symptoms:  ER follow up     The patient is requesting an appointment with  TJP    Was an appointment offered for this call? No        If Yes, what is the date of the appointment?  NA     Preferred method for responding to this message: Telephone Call    Phone number patient can be reached at? Cell number on file:    Telephone Information:   Mobile [phone]       If we can't reach you directly, may we leave a detailed response at the number you provided? Yes    Can this message wait until your PCP/provider returns if unavailable today? No

## 2021-12-23 NOTE — ED NOTES
Baclofen effective in reducing shoulder, back, chest, and neck discomfort.  Cold packs applied for 20 minutes.  Warm blanket provided for comfort.
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
## 2021-12-23 NOTE — TELEPHONE ENCOUNTER
Left message to call back. Dr. Gan does not have openings today and he will be out of clinic after today until 1/3/2022. Would recommend scheduling follow up appointment with another provider.     Roxana Kumar CMA on 12/23/2021 at 12:00 PM

## 2021-12-23 NOTE — ED TRIAGE NOTES
Pain to left shoulder, knife like and sharp.  Started Thursday, but today he was stretching it out because it hurt and he felt it pop.  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  He said it takes three pulls each time and he starts it at least 26 times per day for his work.  Pain radiates to neck, back, and chest.  Concerned about cardiac issues, requesting EKG.  No history of cardiac issues.

## 2021-12-23 NOTE — DISCHARGE INSTRUCTIONS
Ibuprofen 600 mg 3 times a day for 5 days. Take with food and plenty of water  Tylenol 500-1000 mg every 6 hours as needed for pain.  Do not take more than 4000 mg per day   Apply ice wrapped in a towel to painful area 10 minutes 5-6 times/day  Continue gentle range of motion exercises like pendulum swings and shoulder rotations. Avoid heavy lifting and dramatic arm movements.  Follow up with orthopedics in 1-2 weeks.   Follow up with your primary doctor as soon as possible for physical therapy referral.  Return to the emergency department for worsening pain, fever, numbness/weakness of arm, worsening chest pain or difficulty breathing, or if you have any new or changing symptoms/concerns.

## 2021-12-24 ENCOUNTER — TELEPHONE (OUTPATIENT)
Dept: NURSING | Facility: CLINIC | Age: 41
End: 2021-12-24

## 2021-12-24 ENCOUNTER — HOSPITAL ENCOUNTER (EMERGENCY)
Facility: OTHER | Age: 41
Discharge: HOME | End: 2021-12-24
Attending: EMERGENCY MEDICINE | Admitting: EMERGENCY MEDICINE
Payer: MEDICAID

## 2021-12-24 ENCOUNTER — NURSE TRIAGE (OUTPATIENT)
Dept: NURSING | Facility: CLINIC | Age: 41
End: 2021-12-24
Payer: MEDICAID

## 2021-12-24 ENCOUNTER — ANESTHESIA EVENT (OUTPATIENT)
Dept: EMERGENCY MEDICINE | Facility: OTHER | Age: 41
End: 2021-12-24
Payer: MEDICAID

## 2021-12-24 ENCOUNTER — APPOINTMENT (OUTPATIENT)
Dept: CT IMAGING | Facility: OTHER | Age: 41
End: 2021-12-24
Attending: EMERGENCY MEDICINE
Payer: MEDICAID

## 2021-12-24 ENCOUNTER — ANESTHESIA (OUTPATIENT)
Dept: EMERGENCY MEDICINE | Facility: OTHER | Age: 41
End: 2021-12-24
Payer: MEDICAID

## 2021-12-24 VITALS
TEMPERATURE: 98.2 F | RESPIRATION RATE: 18 BRPM | SYSTOLIC BLOOD PRESSURE: 152 MMHG | HEART RATE: 90 BPM | BODY MASS INDEX: 41.75 KG/M2 | OXYGEN SATURATION: 98 % | WEIGHT: 315 LBS | HEIGHT: 73 IN | DIASTOLIC BLOOD PRESSURE: 87 MMHG

## 2021-12-24 DIAGNOSIS — R07.9 CHEST PAIN, UNSPECIFIED TYPE: ICD-10-CM

## 2021-12-24 LAB
ANION GAP SERPL CALCULATED.3IONS-SCNC: 11 MMOL/L (ref 3–14)
BASOPHILS # BLD AUTO: 0 10E3/UL (ref 0–0.2)
BASOPHILS NFR BLD AUTO: 0 %
BUN SERPL-MCNC: 13 MG/DL (ref 7–25)
CALCIUM SERPL-MCNC: 10.4 MG/DL (ref 8.6–10.3)
CHLORIDE BLD-SCNC: 101 MMOL/L (ref 98–107)
CO2 SERPL-SCNC: 26 MMOL/L (ref 21–31)
CREAT SERPL-MCNC: 0.89 MG/DL (ref 0.7–1.3)
EOSINOPHIL # BLD AUTO: 0.1 10E3/UL (ref 0–0.7)
EOSINOPHIL NFR BLD AUTO: 1 %
ERYTHROCYTE [DISTWIDTH] IN BLOOD BY AUTOMATED COUNT: 12.8 % (ref 10–15)
GFR SERPL CREATININE-BSD FRML MDRD: >90 ML/MIN/1.73M2
GLUCOSE BLD-MCNC: 116 MG/DL (ref 70–105)
HCT VFR BLD AUTO: 49.4 % (ref 40–53)
HGB BLD-MCNC: 16.9 G/DL (ref 13.3–17.7)
IMM GRANULOCYTES # BLD: 0 10E3/UL
IMM GRANULOCYTES NFR BLD: 0 %
LYMPHOCYTES # BLD AUTO: 2.1 10E3/UL (ref 0.8–5.3)
LYMPHOCYTES NFR BLD AUTO: 20 %
MCH RBC QN AUTO: 29.4 PG (ref 26.5–33)
MCHC RBC AUTO-ENTMCNC: 34.2 G/DL (ref 31.5–36.5)
MCV RBC AUTO: 86 FL (ref 78–100)
MONOCYTES # BLD AUTO: 0.9 10E3/UL (ref 0–1.3)
MONOCYTES NFR BLD AUTO: 9 %
NEUTROPHILS # BLD AUTO: 7.3 10E3/UL (ref 1.6–8.3)
NEUTROPHILS NFR BLD AUTO: 70 %
NRBC # BLD AUTO: 0 10E3/UL
NRBC BLD AUTO-RTO: 0 /100
PLATELET # BLD AUTO: 150 10E3/UL (ref 150–450)
POTASSIUM BLD-SCNC: 3.9 MMOL/L (ref 3.5–5.1)
RBC # BLD AUTO: 5.74 10E6/UL (ref 4.4–5.9)
SODIUM SERPL-SCNC: 138 MMOL/L (ref 134–144)
TROPONIN I SERPL-MCNC: 8.9 PG/ML (ref 0–34)
WBC # BLD AUTO: 10.5 10E3/UL (ref 4–11)

## 2021-12-24 PROCEDURE — 99283 EMERGENCY DEPT VISIT LOW MDM: CPT | Mod: 25 | Performed by: EMERGENCY MEDICINE

## 2021-12-24 PROCEDURE — 96374 THER/PROPH/DIAG INJ IV PUSH: CPT | Mod: XU | Performed by: EMERGENCY MEDICINE

## 2021-12-24 PROCEDURE — 71275 CT ANGIOGRAPHY CHEST: CPT | Mod: TC

## 2021-12-24 PROCEDURE — 99285 EMERGENCY DEPT VISIT HI MDM: CPT | Mod: 25 | Performed by: EMERGENCY MEDICINE

## 2021-12-24 PROCEDURE — 36415 COLL VENOUS BLD VENIPUNCTURE: CPT | Performed by: EMERGENCY MEDICINE

## 2021-12-24 PROCEDURE — 85025 COMPLETE CBC W/AUTO DIFF WBC: CPT | Performed by: EMERGENCY MEDICINE

## 2021-12-24 PROCEDURE — 93308 TTE F-UP OR LMTD: CPT | Mod: 26 | Performed by: EMERGENCY MEDICINE

## 2021-12-24 PROCEDURE — 96376 TX/PRO/DX INJ SAME DRUG ADON: CPT | Performed by: EMERGENCY MEDICINE

## 2021-12-24 PROCEDURE — 84484 ASSAY OF TROPONIN QUANT: CPT | Performed by: EMERGENCY MEDICINE

## 2021-12-24 PROCEDURE — 258N000003 HC RX IP 258 OP 636: Performed by: EMERGENCY MEDICINE

## 2021-12-24 PROCEDURE — 36410 VNPNXR 3YR/> PHY/QHP DX/THER: CPT | Performed by: NURSE ANESTHETIST, CERTIFIED REGISTERED

## 2021-12-24 PROCEDURE — 93308 TTE F-UP OR LMTD: CPT | Mod: TC | Performed by: EMERGENCY MEDICINE

## 2021-12-24 PROCEDURE — 250N000011 HC RX IP 250 OP 636: Performed by: EMERGENCY MEDICINE

## 2021-12-24 PROCEDURE — 93005 ELECTROCARDIOGRAM TRACING: CPT | Performed by: EMERGENCY MEDICINE

## 2021-12-24 PROCEDURE — 82374 ASSAY BLOOD CARBON DIOXIDE: CPT | Performed by: EMERGENCY MEDICINE

## 2021-12-24 PROCEDURE — 93010 ELECTROCARDIOGRAM REPORT: CPT | Performed by: INTERNAL MEDICINE

## 2021-12-24 PROCEDURE — 250N000013 HC RX MED GY IP 250 OP 250 PS 637: Performed by: EMERGENCY MEDICINE

## 2021-12-24 RX ORDER — OXYCODONE HYDROCHLORIDE 5 MG/1
10 TABLET ORAL ONCE
Status: COMPLETED | OUTPATIENT
Start: 2021-12-24 | End: 2021-12-24

## 2021-12-24 RX ORDER — FENTANYL CITRATE 50 UG/ML
100 INJECTION, SOLUTION INTRAMUSCULAR; INTRAVENOUS ONCE
Status: COMPLETED | OUTPATIENT
Start: 2021-12-24 | End: 2021-12-24

## 2021-12-24 RX ORDER — IOPAMIDOL 755 MG/ML
110 INJECTION, SOLUTION INTRAVASCULAR ONCE
Status: COMPLETED | OUTPATIENT
Start: 2021-12-24 | End: 2021-12-24

## 2021-12-24 RX ORDER — OXYCODONE HYDROCHLORIDE 5 MG/1
5-10 TABLET ORAL EVERY 6 HOURS PRN
Qty: 12 TABLET | Refills: 0 | Status: SHIPPED | OUTPATIENT
Start: 2021-12-24 | End: 2021-12-28

## 2021-12-24 RX ADMIN — OXYCODONE HYDROCHLORIDE 10 MG: 5 TABLET ORAL at 13:43

## 2021-12-24 RX ADMIN — IOPAMIDOL 110 ML: 755 INJECTION, SOLUTION INTRAVENOUS at 12:13

## 2021-12-24 RX ADMIN — FENTANYL CITRATE 100 MCG: 50 INJECTION INTRAMUSCULAR; INTRAVENOUS at 12:03

## 2021-12-24 RX ADMIN — FENTANYL CITRATE 100 MCG: 50 INJECTION INTRAMUSCULAR; INTRAVENOUS at 08:09

## 2021-12-24 RX ADMIN — SODIUM CHLORIDE 1000 ML: 9 INJECTION, SOLUTION INTRAVENOUS at 08:07

## 2021-12-24 ASSESSMENT — MIFFLIN-ST. JEOR: SCORE: 2400.85

## 2021-12-24 NOTE — TELEPHONE ENCOUNTER
"Wife called and patient is having very bad pain in his left shoulder pain into chest.    Got permission from Florencio to speak with his wife.    Patient was in last night in ED and sent home.  Pain has now gotten worse.  Patient states pain is 10/10.  Nothing that he does helps it.      Per protocol patient should be seen in ED.  Wife will drive patient to ED.    Myrna Cary RN   12/24/21 6:16 AM  [facility] Nurse Advisor      Reason for Disposition    [1] Age > 40 AND [2] no obvious cause AND [3] pain even when not moving the arm    (Exception: pain is clearly made worse by moving arm or bending neck)    Additional Information    Negative: Passed out (i.e., lost consciousness, collapsed and was not responding)    Negative: Shock suspected (e.g., cold/pale/clammy skin, too weak to stand, low BP, rapid pulse)    Negative: [1] Similar pain previously AND [2] it was from \"heart attack\"    Negative: [1] Similar pain previously AND [2] it was from \"angina\" AND [3] not relieved by nitroglycerin    Negative: Sounds like a life-threatening emergency to the triager    Negative: Followed a shoulder injury    Negative: Chest pain    Negative: Difficulty breathing or unusual sweating (e.g., sweating without exertion)    Negative: [1] Pain lasting > 5 minutes AND [2] pain also present in chest  (Exception: pain is clearly made worse by movement)    Protocols used: SHOULDER PAIN-A-AH      "

## 2021-12-24 NOTE — ED NOTES
Continued complaints of pain.  Had some relief with fentanyl but time gone by while waiting for iv to do testing.  
Verbalizes understanding of discharge instructions alert orientated VSS   
ANNE MARIE MENA is a 15y old Female with a past medical history significant for neuromuscular scoliosis, sensorineural hearing loss with b/l TM perforations, chronic constipation, global developmental delays and hypotonia presenting with thoracolumbar scoliosis s/p the second stage of a two stage spinal repair. She underwent the first part of her surgery on 7/23/20 and is now scheduled for the second stage of her surgery. Today, she underwent a T3-L4 posterior spinal fusion with plastics closur. EBL 400mL.

## 2021-12-24 NOTE — ANESTHESIA CARE TRANSFER NOTE
Patient: Matt May    Procedure: * No procedures listed *       Diagnosis: * No pre-op diagnosis entered *  Diagnosis Additional Information: No value filed.    Anesthesia Type:   No value filed.     Note:      Level of Consciousness: awake  Oxygen Supplementation: room air    Independent Airway: airway patency satisfactory and stable  Dentition: dentition unchanged  Vital Signs Stable: post-procedure vital signs reviewed and stable  Report to RN Given: handoff report given  Patient transferred to: Emergency Department    Handoff Report: Identifed the Patient, Identified the Reponsible Provider, Reviewed the pertinent medical history, Discussed the surgical course, Reviewed Intra-OP anesthesia mangement and issues during anesthesia, Set expectations for post-procedure period and Allowed opportunity for questions and acknowledgement of understanding      Vitals:  Vitals Value Taken Time   BP     Temp     Pulse     Resp     SpO2         Electronically Signed By: David Kellerman, APRN CRNA  December 24, 2021  11:05 AM

## 2021-12-24 NOTE — ED TRIAGE NOTES
"ED Nursing Triage Note (General)   ________________________________    Matt CHAVES Lalo is a 41 year old Male that presents to triage private car  With history of  Chest pain onset last Thursday that gradually got worse and now cannot sleep.  Had workup in this ED yesterday reported by patient   Significant symptoms had onset 2 day(s) ago.  BP (!) 180/115   Pulse 90   Temp 98.2  F (36.8  C) (Tympanic)   Resp 18   Ht 1.854 m (6' 1\")   Wt 144.7 kg (319 lb)   SpO2 96%   BMI 42.09 kg/m  t  Patient appears alert , in severe distress., and cooperative behavior.    GCS Total = 15  Airway: intact  Breathing noted as Normal  Circulation Normal  Skin:  Normal  Action taken:  Triage to critical care immediately      PRE HOSPITAL PRIOR LIVING SITUATION Spouse  "

## 2021-12-24 NOTE — ED PROVIDER NOTES
Emergency Department Provider Note  : 1980 Age: 41 year old Sex: male MRN: [id number]    Chief Complaint   Patient presents with     Chest Pain       Medical Decision Making / Assessment / Plan   41 year old male presenting with chest pain.  Significant chest pain rating to the left shoulder and his back.  He had a broad cardiac work-up a few days ago, bedside ultrasound not demonstrate large pericardial effusion or dilated aorta.  Given the nature of the pain though and hypertension, CT aortogram was performed and thankfully was negative.  Additional cardiac work-up negative as well.  Likely musculoskeletal in nature.  Provided pain control, follow-up home.    The patient was informed of the plan and verbalized understanding and agreed with the plan. The patient was given strict return to Emergency Department precautions as well as appropriate follow up instructions. The patient was discharged in stable condition.    Discharge Medication List as of 2021  2:10 PM      START taking these medications    Details   oxyCODONE (ROXICODONE) 5 MG tablet Take 1-2 tablets (5-10 mg) by mouth every 6 hours as needed for pain, Disp-12 tablet, R-0, E-Prescribe             Final diagnoses:   Chest pain, unspecified type       Jose Jha MD  2021   Emergency Department    Subjective   Matt is a 41 year old male who presents at  7:02 AM with chest pain that is been severe nature and is ripping sensation towards his back as well as his left shoulder.  Does not worse with repositioning.  Constantly there.  No syncope, shortness of breath    I have reviewed the Medications, Allergies, Past Medical and Surgical History, and Social History in the Epic System and with family.    Review of Systems:  Please see HPI for pertinent positives and negatives. All other systems reviewed and found to be negative.      Objective   BP: (!) 180/115  Pulse: 90  Temp: 98.2  F (36.8  C)  Resp: 18  Height: 185.4 cm (6'  "1\")  Weight: 144.7 kg (319 lb)  SpO2: 96 %    Physical Exam:   General: Awake, alert, in a lot of pain.  Head: Normocephalic, atraumatic.  Eyes: Conjugate gaze.  ENT: Moist membranes, external ear appears normal.   Chest/Respiratory: Equal chest rise.  Cardiovascular: Peripheral pulses present.  Abdominal: Soft, non-distended, non-tender.  Extremities: No obvious deformity.  Neurological: GCS 15, moving all extremities without gross deficit.  Skin: Warm, no rashes, lesions, or bruising.   Psychiatric: Appropriate affect.     Procedures / Critical Care   Procedures    Critical Care Time: None.          Medical/Surgical History:  Past Medical History:   Diagnosis Date     Adhesive capsulitis of left shoulder     11/29/2016     Gastro-esophageal reflux disease without esophagitis     No Comments Provided     Nonspecific reaction to tuberculin skin test without active tuberculosis     2009,negative chest x-ray. Completed isoniazid.     Other shoulder lesions, left shoulder     11/28/2016     Past Surgical History:   Procedure Laterality Date     APPENDECTOMY OPEN      2005       Medications:  No current facility-administered medications for this encounter.     Current Outpatient Medications   Medication     baclofen (LIORESAL) 10 MG tablet     ibuprofen (ADVIL/MOTRIN) 200 MG tablet     oxyCODONE (ROXICODONE) 5 MG tablet     oxyCODONE-acetaminophen (PERCOCET) 5-325 MG tablet       Allergies:  Penicillins and Codeine    Relevant labs, images, EKGs, Epic and outside hospital (if applicable) charts were reviewed. The findings, diagnosis, plan, and need for follow up were discussed with the patient/family. Nursing notes were reviewed.      Jose Jha MD  12/24/21 8042    "

## 2021-12-24 NOTE — ANESTHESIA POSTPROCEDURE EVALUATION
Patient: Matt May    Procedure: * No procedures listed *       Diagnosis:* No pre-op diagnosis entered *  Diagnosis Additional Information: No value filed.    Anesthesia Type:  No value filed.    Note:  Disposition: Emergency room 5.   Postop Pain Control:            Sign Out: Base line Pain control.   PONV: No   Neuro/Psych:             Sign Out: Acceptable/Baseline neuro status   Airway/Respiratory:             Sign Out: Acceptable/Baseline resp. status   CV/Hemodynamics:             Sign Out: Acceptable CV status   Other NRE: NONE   DID A NON-ROUTINE EVENT OCCUR? No           Last vitals:  Vitals Value Taken Time   BP     Temp     Pulse     Resp     SpO2         Electronically Signed By: David Kellerman, APRN CRNA  December 24, 2021  11:05 AM

## 2021-12-25 NOTE — TELEPHONE ENCOUNTER
Patient calling with question about:    Taking 2 medications at the same time for pain.    Patient states he has shoulder pain issues and was seen in ER today for this pain.  He reports that he was given oxycodone 5 mg (12 tabs) to take every 6 hours, PRN.      Patient found old and  Rx for baclofen at home and was wondering if he could take both of these medications together as he 'remembers they gave him both while at ER today.'    Patient is advised:  1.  Taking medications that are  can often have serious consequences    2.  He would have to be advised by either a Pharmacist or prescribing provider about mixing pain and muscle relaxes without being monitored.    Patient verbalized understanding and agreement.  He stated he will only take the Oxycodone tonight and will call back tomorrow if he has further questions or concerns.    Lis Jerez RN, Nurse Advisor 8:57 PM 2021    COVID 19 Nurse Triage Plan/Patient Instructions    Please be aware that novel coronavirus (COVID-19) may be circulating in the community. If you develop symptoms such as fever, cough, or SOB or if you have concerns about the presence of another infection including coronavirus (COVID-19), please contact your health care provider or visit [email or web address].     Disposition/Instructions    Home care recommended. Follow home care protocol based instructions.    Thank you for taking steps to prevent the spread of this virus.  o Limit your contact with others.  o Wear a simple mask to cover your cough.  o Wash your hands well and often.    Resources    [facility]: About COVID-19: [email or web address]    CDC: What to Do If You're Sick: www.cdc.gov/coronavirus/2019-ncov/about/steps-when-sick.html    CDC: Ending Home Isolation: www.cdc.gov/coronavirus/2019-ncov/hcp/disposition-in-home-patients.html     CDC: Caring for Someone: www.cdc.gov/coronavirus/2019-ncov/if-you-are-sick/care-for-someone.html      [facility]: Interim Guidance for Hospital Discharge to Home: www.health.[facility].[place]/diseases/coronavirus/hcp/hospdischarge.pdf    [facility] clinical trials (COVID-19 research studies): clinicalaffairs.[email or web address]/n-clinical-trials     Below are the COVID-19 hotlines at the [place] Department of Health ([facility]). Interpreters are available.   o For health questions: Call 335-323-7584 or 1-325.729.2692 (7 a.m. to 7 p.m.)  o For questions about schools and childcare: Call 272-661-3278 or 1-503.920.4573 (7 a.m. to 7 p.m.)

## 2021-12-28 ENCOUNTER — OFFICE VISIT (OUTPATIENT)
Dept: FAMILY MEDICINE | Facility: OTHER | Age: 41
End: 2021-12-28
Attending: FAMILY MEDICINE
Payer: MEDICAID

## 2021-12-28 ENCOUNTER — HOSPITAL ENCOUNTER (OUTPATIENT)
Dept: GENERAL RADIOLOGY | Facility: OTHER | Age: 41
End: 2021-12-28
Attending: FAMILY MEDICINE
Payer: MEDICAID

## 2021-12-28 VITALS
SYSTOLIC BLOOD PRESSURE: 140 MMHG | WEIGHT: 309.6 LBS | TEMPERATURE: 97.6 F | DIASTOLIC BLOOD PRESSURE: 102 MMHG | OXYGEN SATURATION: 98 % | HEART RATE: 96 BPM | BODY MASS INDEX: 40.85 KG/M2 | RESPIRATION RATE: 16 BRPM

## 2021-12-28 DIAGNOSIS — E66.01 MORBID OBESITY (H): ICD-10-CM

## 2021-12-28 DIAGNOSIS — M54.12 CERVICAL RADICULOPATHY: ICD-10-CM

## 2021-12-28 DIAGNOSIS — M54.12 CERVICAL RADICULOPATHY: Primary | ICD-10-CM

## 2021-12-28 LAB
ATRIAL RATE - MUSE: 80 BPM
DIASTOLIC BLOOD PRESSURE - MUSE: NORMAL MMHG
INTERPRETATION ECG - MUSE: NORMAL
P AXIS - MUSE: 68 DEGREES
PR INTERVAL - MUSE: 122 MS
QRS DURATION - MUSE: 100 MS
QT - MUSE: 374 MS
QTC - MUSE: 431 MS
R AXIS - MUSE: 26 DEGREES
SYSTOLIC BLOOD PRESSURE - MUSE: NORMAL MMHG
T AXIS - MUSE: 42 DEGREES
VENTRICULAR RATE- MUSE: 80 BPM

## 2021-12-28 PROCEDURE — G0463 HOSPITAL OUTPT CLINIC VISIT: HCPCS

## 2021-12-28 PROCEDURE — 72040 X-RAY EXAM NECK SPINE 2-3 VW: CPT

## 2021-12-28 PROCEDURE — 99214 OFFICE O/P EST MOD 30 MIN: CPT | Performed by: FAMILY MEDICINE

## 2021-12-28 RX ORDER — GABAPENTIN 100 MG/1
100 CAPSULE ORAL AT BEDTIME
Qty: 20 CAPSULE | Refills: 0 | Status: SHIPPED | OUTPATIENT
Start: 2021-12-28 | End: 2022-01-03

## 2021-12-28 RX ORDER — METHYLPREDNISOLONE 4 MG
TABLET, DOSE PACK ORAL
Qty: 21 TABLET | Refills: 0 | Status: SHIPPED | OUTPATIENT
Start: 2021-12-28 | End: 2022-01-03

## 2021-12-28 RX ORDER — OXYCODONE HYDROCHLORIDE 5 MG/1
5-10 TABLET ORAL EVERY 6 HOURS PRN
Qty: 12 TABLET | Refills: 0 | Status: SHIPPED | OUTPATIENT
Start: 2021-12-28 | End: 2022-01-10

## 2021-12-28 ASSESSMENT — ANXIETY QUESTIONNAIRES
2. NOT BEING ABLE TO STOP OR CONTROL WORRYING: NOT AT ALL
5. BEING SO RESTLESS THAT IT IS HARD TO SIT STILL: NOT AT ALL
GAD7 TOTAL SCORE: 3
3. WORRYING TOO MUCH ABOUT DIFFERENT THINGS: NOT AT ALL
7. FEELING AFRAID AS IF SOMETHING AWFUL MIGHT HAPPEN: NOT AT ALL
4. TROUBLE RELAXING: NEARLY EVERY DAY
GAD7 TOTAL SCORE: 3
6. BECOMING EASILY ANNOYED OR IRRITABLE: NOT AT ALL
1. FEELING NERVOUS, ANXIOUS, OR ON EDGE: NOT AT ALL
7. FEELING AFRAID AS IF SOMETHING AWFUL MIGHT HAPPEN: NOT AT ALL
GAD7 TOTAL SCORE: 3

## 2021-12-28 ASSESSMENT — PATIENT HEALTH QUESTIONNAIRE - PHQ9
SUM OF ALL RESPONSES TO PHQ QUESTIONS 1-9: 3
SUM OF ALL RESPONSES TO PHQ QUESTIONS 1-9: 3
10. IF YOU CHECKED OFF ANY PROBLEMS, HOW DIFFICULT HAVE THESE PROBLEMS MADE IT FOR YOU TO DO YOUR WORK, TAKE CARE OF THINGS AT HOME, OR GET ALONG WITH OTHER PEOPLE: SOMEWHAT DIFFICULT

## 2021-12-28 ASSESSMENT — PAIN SCALES - GENERAL: PAINLEVEL: WORST PAIN (10)

## 2021-12-28 NOTE — LETTER
December 28, 2021      Matt May  [address] [facility] [address]        To Whom It May Concern:    Matt May was seen in our clinic, he is experiencing acute   left upper extremity pain. He has been evaluated in the emergency department as well as clinic. Currently undergoing additional work-up. Is unable to perform his work duties at this time. Will re-evaluate next week after MRI has been completed      Sincerely,        Mirta Pollack MD

## 2021-12-28 NOTE — NURSING NOTE
Patient is here for follow up for recent ER visit. States has been having pain across his chest, and tingling in his arm, hands and leg.     Medication Reconciliation: complete    FOOD SECURITY SCREENING QUESTIONS  Hunger Vital Signs:  Within the past 12 months we worried whether our food would run out before we got money to buy more. Never  Within the past 12 months the food we bought just didn't last and we didn't have money to get more. Never  Bronwyn Quinones LPN 12/28/2021 9:42 AM       Advance care directive on file? no  Advance care directive provided to patient? no       Bronwyn Quinones LPN

## 2021-12-28 NOTE — PATIENT INSTRUCTIONS
Description of pain is consistent with nerve impingement.  I suspect it may be related to nerve impingement in the neck.  Recommend proceeding with cervical MRI for further evaluation.    Steroid taper will help decrease inflammation and improve pain.  You cannot use anti-inflammatory such as Motrin Aleve or Advil while taking steroids.    You can continue to use oxycodone or Tylenol for pain.    Also recommend starting gabapentin 1 to 2 capsules at bedtime.  This is a nerve pain medication and will help with sleep.

## 2021-12-28 NOTE — PROGRESS NOTES
SUBJECTIVE:   Matt May is a 41 year old male who presents to clinic today for the following health issues:  Nursing Notes:   Bronwyn Quinones LPN  12/28/2021  9:52 AM  Signed  Patient is here for follow up for recent ER visit. States has been having pain across his chest, and tingling in his arm, hands and leg.     Medication Reconciliation: complete    FOOD SECURITY SCREENING QUESTIONS  Hunger Vital Signs:  Within the past 12 months we worried whether our food would run out before we got money to buy more. Never  Within the past 12 months the food we bought just didn't last and we didn't have money to get more. Never  Bronwyn Quinones LPN 12/28/2021 9:42 AM       Advance care directive on file? no  Advance care directive provided to patient? no       Bronwyn Quinones LPN      HPI    42 yo male with left shoulder/lateral chest wall pain for 10 days, Was seen x 2 in ER over the holidays. Initially thought related to RTC. Pain persisted and he returned. Cardiac work-up negative including CTA.    Describes as burning constant pain that starts in his mid left scapula radiates into the anterior shoulder and then down the lateral aspect of the forearm into his fourth and fifth fingers.  Pain improves if he lifts his arm above his head.    He describes a tingling sensation in his fingers.  Denies  Weakness.    He denies specific injury or trauma.  He does work in manual labor job    Oxycodone is dulling of pain.  Patient Active Problem List    Diagnosis Date Noted     Morbid obesity (H) 12/28/2021     Priority: Medium     Tear of talofibular ligament of left lower extremity, sequela 04/25/2018     Priority: Medium     Adhesive capsulitis of left shoulder 11/29/2016     Priority: Medium     Tendinitis of left rotator cuff 11/28/2016     Priority: Medium     Backache 01/11/2012     Priority: Medium     Pain in joint, lower leg 01/11/2012     Priority: Medium     Obesity 01/11/2012     Priority: Medium      Tobacco use disorder 01/11/2012     Priority: Medium     Cannabis abuse 07/14/2010     Priority: Medium     Esophageal reflux 06/16/2010     Priority: Medium     Nonspecific reaction to tuberculin skin test without active tuberculosis 07/23/2009     Priority: Medium     Overview:   Positive Mantoux.  Negative chest x-ray.  Started on isoniazid.       Past Medical History:   Diagnosis Date     Adhesive capsulitis of left shoulder     11/29/2016     Gastro-esophageal reflux disease without esophagitis     No Comments Provided     Nonspecific reaction to tuberculin skin test without active tuberculosis     2009,negative chest x-ray. Completed isoniazid.     Other shoulder lesions, left shoulder     11/28/2016      Past Surgical History:   Procedure Laterality Date     APPENDECTOMY OPEN      2005       Review of Systems   All other systems reviewed and are negative.       OBJECTIVE:     BP (!) 140/102   Pulse 96   Temp 97.6  F (36.4  C) (Tympanic)   Resp 16   Wt 140.4 kg (309 lb 9.6 oz)   SpO2 98%   BMI 40.85 kg/m    Body mass index is 40.85 kg/m .  Physical Exam  Constitutional:       Comments: Appears uncomfortable, has arm above his head   Musculoskeletal:      Cervical back: Normal range of motion. No rigidity.   Neurological:      General: No focal deficit present.      Mental Status: He is alert.      Cranial Nerves: No cranial nerve deficit.      Sensory: No sensory deficit.      Motor: No weakness.      Gait: Gait normal.      Deep Tendon Reflexes: Reflexes normal.             ASSESSMENT/PLAN:           ICD-10-CM    1. Cervical radiculopathy  M54.12 XR Cervical Spine 2/3 Views     gabapentin (NEURONTIN) 100 MG capsule     methylPREDNISolone (MEDROL DOSEPAK) 4 MG tablet therapy pack     MR Cervical Spine w/o Contrast     oxyCODONE (ROXICODONE) 5 MG tablet   2. Morbid obesity (H)  E66.01      Reviewed recent ER notes.  Doubt cardiac in origin given its a constant pain that improves when he lifts his arm  above his head.  Cardiac work-up was negative.    Symptoms consistent with cervical radiculopathy.  C6-C7.     Recommend steroid taper.  Gabapentin at bedtime.  Proceed with cervical MRI for further evaluation    Patient Instructions   Description of pain is consistent with nerve impingement.  I suspect it may be related to nerve impingement in the neck.  Recommend proceeding with cervical MRI for further evaluation.    Steroid taper will help decrease inflammation and improve pain.  You cannot use anti-inflammatory such as Motrin Aleve or Advil while taking steroids.    You can continue to use oxycodone or Tylenol for pain.    Also recommend starting gabapentin 1 to 2 capsules at bedtime.  This is a nerve pain medication and will help with sleep.    Mirta Pollack MD  [facility] AND \[facility]\""  Answers for HPI/ROS submitted by the patient on 12/28/2021  If you checked off any problems, how difficult have these problems made it for you to do your work, take care of things at home, or get along with other people?: Somewhat difficult  PHQ9 TOTAL SCORE: 3  FRANCISCO J 7 TOTAL SCORE: 3

## 2021-12-29 ENCOUNTER — TELEPHONE (OUTPATIENT)
Dept: FAMILY MEDICINE | Facility: OTHER | Age: 41
End: 2021-12-29
Payer: MEDICAID

## 2021-12-29 ASSESSMENT — ANXIETY QUESTIONNAIRES: GAD7 TOTAL SCORE: 3

## 2021-12-29 ASSESSMENT — PATIENT HEALTH QUESTIONNAIRE - PHQ9: SUM OF ALL RESPONSES TO PHQ QUESTIONS 1-9: 3

## 2021-12-29 NOTE — TELEPHONE ENCOUNTER
Needs to get clarification on the directions for medication. Please call.    Dameon Segura on 12/29/2021 at 9:08 AM

## 2021-12-29 NOTE — TELEPHONE ENCOUNTER
"Per 12/28/2021 OV note: \"Recommend steroid taper.  Gabapentin at bedtime.  Proceed with cervical MRI for further evaluation\"    Pharmacy informed Gabapentin to be taken at bedtime.     Roxana Kumar CMA on 12/29/2021 at 1:32 PM      "

## 2021-12-30 ENCOUNTER — HOSPITAL ENCOUNTER (OUTPATIENT)
Dept: MRI IMAGING | Facility: OTHER | Age: 41
Discharge: HOME | End: 2021-12-30
Attending: FAMILY MEDICINE | Admitting: FAMILY MEDICINE
Payer: MEDICAID

## 2021-12-30 DIAGNOSIS — M54.12 CERVICAL RADICULOPATHY: ICD-10-CM

## 2021-12-30 PROCEDURE — 72141 MRI NECK SPINE W/O DYE: CPT

## 2021-12-31 ENCOUNTER — TELEPHONE (OUTPATIENT)
Dept: FAMILY MEDICINE | Facility: OTHER | Age: 41
End: 2021-12-31
Payer: MEDICAID

## 2021-12-31 DIAGNOSIS — M50.123 CERVICAL DISC DISORDER AT C6-C7 LEVEL WITH RADICULOPATHY: Primary | ICD-10-CM

## 2021-12-31 NOTE — TELEPHONE ENCOUNTER
Patient called back and was given results. States that the pain is not any better. States that he is currently not interested in the spinal injection but rather your opinion on something else what will not just treat the pain but eliminate the cause. Is surgery an option?     Roxana Davis LPN on 12/31/2021 at 9:46 AM

## 2021-12-31 NOTE — TELEPHONE ENCOUNTER
Surgery is an option when conservative management fails (medications, PT and injection)    We can certainly have him see the spine surgeon to discuss    Mirta Pollack MD

## 2021-12-31 NOTE — TELEPHONE ENCOUNTER
Called and notified patient of below note. He would also like to try PT.     Referral pending. Would like GIKRISTEN.     Roxana Davis LPN on 12/31/2021 at 10:01 AM

## 2022-01-03 ENCOUNTER — OFFICE VISIT (OUTPATIENT)
Dept: FAMILY MEDICINE | Facility: OTHER | Age: 42
End: 2022-01-03
Attending: FAMILY MEDICINE
Payer: COMMERCIAL

## 2022-01-03 VITALS
DIASTOLIC BLOOD PRESSURE: 94 MMHG | SYSTOLIC BLOOD PRESSURE: 152 MMHG | TEMPERATURE: 98.3 F | OXYGEN SATURATION: 96 % | HEART RATE: 84 BPM | RESPIRATION RATE: 20 BRPM

## 2022-01-03 DIAGNOSIS — M54.12 CERVICAL RADICULOPATHY: ICD-10-CM

## 2022-01-03 PROCEDURE — G0463 HOSPITAL OUTPT CLINIC VISIT: HCPCS

## 2022-01-03 PROCEDURE — G0463 HOSPITAL OUTPT CLINIC VISIT: HCPCS | Mod: 25

## 2022-01-03 PROCEDURE — 99214 OFFICE O/P EST MOD 30 MIN: CPT | Performed by: FAMILY MEDICINE

## 2022-01-03 RX ORDER — GABAPENTIN 300 MG/1
CAPSULE ORAL
Qty: 120 CAPSULE | Refills: 1 | Status: SHIPPED | OUTPATIENT
Start: 2022-01-03 | End: 2022-01-10

## 2022-01-03 RX ORDER — OXYCODONE HYDROCHLORIDE 5 MG/1
5-10 TABLET ORAL EVERY 6 HOURS PRN
Qty: 12 TABLET | Refills: 0 | Status: CANCELLED | OUTPATIENT
Start: 2022-01-03

## 2022-01-03 RX ORDER — OXYCODONE AND ACETAMINOPHEN 5; 325 MG/1; MG/1
1-2 TABLET ORAL EVERY 6 HOURS PRN
Qty: 30 TABLET | Refills: 0 | Status: SHIPPED | OUTPATIENT
Start: 2022-01-03 | End: 2022-01-10

## 2022-01-03 ASSESSMENT — PAIN SCALES - GENERAL: PAINLEVEL: WORST PAIN (10)

## 2022-01-03 NOTE — LETTER
January 3, 2022      Matt May  [address] [facility] [address]        To Whom It May Concern:    Matt May was seen in our clinic. He is unable to work at this time due to a cervical radiculopathy.  No work until after seeing spine surgeon, which would be February 15 at the earliest.      Sincerely,        Marques Veras MD

## 2022-01-03 NOTE — PROGRESS NOTES
"Nursing Notes:   Vee Lamb LPN  1/3/2022 10:02 AM  Sign at exiting of workspace  Patient presents to the clinic for follow up with medication management for Herniated Cervical Disks.    FOOD SECURITY SCREENING QUESTIONS:    The next two questions are to help us understand your food security.  If you are feeling you need any assistance in this area, we have resources available to support you today.    Hunger Vital Signs:  Within the past 12 months we worried whether our food would run out before we got money to buy more. Never  Within the past 12 months the food we bought just didn't last and we didn't have money to get more. Never    Advance Care Directive on file? no  Advance Care Directive provided to patient? Declined.     Chief Complaint   Patient presents with     Recheck Medication       Initial BP (!) 152/94 (BP Location: Right arm, Patient Position: Sitting, Cuff Size: Adult Large)   Pulse 84   Temp 98.3  F (36.8  C) (Tympanic)   Resp 20   SpO2 96%  Estimated body mass index is 40.85 kg/m  as calculated from the following:    Height as of 12/24/21: 1.854 m (6' 1\").    Weight as of 12/28/21: 140.4 kg (309 lb 9.6 oz).  Medication Reconciliation: complete        Vee Lamb LPN         Assessment & Plan       ICD-10-CM    1. Cervical radiculopathy  M54.12 oxyCODONE-acetaminophen (PERCOCET) 5-325 MG tablet     gabapentin (NEURONTIN) 300 MG capsule     Reviewed ED notes and clinic note  Reviewed MRI with patient    MRI showed severe left C7 nerve root compression consistent with symptoms in arm and left chest.  He may have weakness, assessment is difficult as it is limited by pain.  Has noticed mild benefit from gabapentin at 200 mg.  No side effects.  Recommend increasing gabapentin to 300 mg twice daily for 2 days, then 300 mg three times daily for a couple days then 300 mg (1 pill) twice daily and 600 mg (2 pills) at night    Oxycodone as needed for pain, given 12 more pills to use 1 per day " to help with pain in the evening. Careful of sedation with concurrent gabapentin. Continue ibuprofen use.  [facility] Review       Value Time User    State [facility] site checked  Yes 1/3/2022 10:25 AM Marques Veras MD         Recommend starting physical therapy.  It is possible that he will see enough improvement through treatment to avoid cervical KULWINDER or surgery.  Does have an appointment arranged with Dr. Rasheed in February.  If he has a decline in strength, we may need to move up his spine surgery appointment.    Should remain off work given significance of nerve impingement on MRI as well as uncontrolled radiculopathy symptoms.    Follow up 2 weeks      Marques Veras MD     [facility]OSPITAL      SUBJECTIVE:  41 year old male presents for follow up on cervical radiculopathy  Woke up 3 weeks ago with a stiff neck. Seen in ED twice for left chest pain, eventually felt to be a cervical radiculopathy with pain down arm  MRI showed severe left C7 nerve root compression  Given methylprednisolone taper last week by Dr Pollack. No change in symptoms  Oxycodone provided to help pain. He does get temporary relief.  Gabapentin for bedtime has helped slightly  He reports a lot of burning and electrical discomfort.   Off work due to discomfort. 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    Current Outpatient Medications   Medication Sig Dispense Refill     gabapentin (NEURONTIN) 100 MG capsule Take 1 capsule (100 mg) by mouth At Bedtime 1-2 capsules at bedtime 20 capsule 0     ibuprofen (ADVIL/MOTRIN) 200 MG tablet Take 800 mg by mouth every 6 hours as needed       oxyCODONE (ROXICODONE) 5 MG tablet Take 1-2 tablets (5-10 mg) by mouth every 6 hours as needed for pain 12 tablet 0     oxyCODONE-acetaminophen (PERCOCET) 5-325 MG tablet Take 1 tablet by mouth every 6 hours as needed for severe pain       Allergies    Allergen Reactions     Penicillins Hives     Other reaction(s): Edema     Codeine Itching and Rash       OBJECTIVE:  BP (!) 152/94 (BP Location: Right arm, Patient Position: Sitting, Cuff Size: Adult Large)   Pulse 84   Temp 98.3  F (36.8  C) (Tympanic)   Resp 20   SpO2 96%     EXAM:  General Appearance: Pleasant, alert, appropriate appearance for age. Uncomfortable, but no acute distress  Musculoskeletal Exam: Minimal tenderness over posterior neck.  Positive Spurling sign on the left.  Neurologic Exam: reflexes 2+, strength appears symmetric upper extremities, but assessment limited by pain  Psychiatric: Normal affect and mentation

## 2022-01-03 NOTE — NURSING NOTE
"Patient presents to the clinic for follow up with medication management for Herniated Cervical Disks.    FOOD SECURITY SCREENING QUESTIONS:    The next two questions are to help us understand your food security.  If you are feeling you need any assistance in this area, we have resources available to support you today.    Hunger Vital Signs:  Within the past 12 months we worried whether our food would run out before we got money to buy more. Never  Within the past 12 months the food we bought just didn't last and we didn't have money to get more. Never    Advance Care Directive on file? no  Advance Care Directive provided to patient? Declined.     Chief Complaint   Patient presents with     Recheck Medication       Initial BP (!) 152/94 (BP Location: Right arm, Patient Position: Sitting, Cuff Size: Adult Large)   Pulse 84   Temp 98.3  F (36.8  C) (Tympanic)   Resp 20   SpO2 96%  Estimated body mass index is 40.85 kg/m  as calculated from the following:    Height as of 12/24/21: 1.854 m (6' 1\").    Weight as of 12/28/21: 140.4 kg (309 lb 9.6 oz).  Medication Reconciliation: complete        Vee Lamb LPN    "

## 2022-01-03 NOTE — PATIENT INSTRUCTIONS
Take gabapentin 300 mg twice daily for 2 days, then 300 mg three times daily for a couple days then 300 mg (1 pill) twice daily and 600 mg (2 pills) at night    Oxycodone as needed for pain. Gabapentin should be a better option for the nerve pain. Careful of sedation    Start physical therapy  Follow up in a couple weeks    No work for now

## 2022-01-10 ENCOUNTER — OFFICE VISIT (OUTPATIENT)
Dept: FAMILY MEDICINE | Facility: OTHER | Age: 42
End: 2022-01-10
Attending: FAMILY MEDICINE
Payer: COMMERCIAL

## 2022-01-10 VITALS
DIASTOLIC BLOOD PRESSURE: 100 MMHG | OXYGEN SATURATION: 98 % | RESPIRATION RATE: 20 BRPM | SYSTOLIC BLOOD PRESSURE: 144 MMHG | HEART RATE: 72 BPM | TEMPERATURE: 98.6 F

## 2022-01-10 DIAGNOSIS — M54.12 CERVICAL RADICULOPATHY: ICD-10-CM

## 2022-01-10 PROCEDURE — G0463 HOSPITAL OUTPT CLINIC VISIT: HCPCS | Mod: 25

## 2022-01-10 PROCEDURE — 99214 OFFICE O/P EST MOD 30 MIN: CPT | Performed by: FAMILY MEDICINE

## 2022-01-10 PROCEDURE — G0463 HOSPITAL OUTPT CLINIC VISIT: HCPCS

## 2022-01-10 RX ORDER — GABAPENTIN 600 MG/1
1200 TABLET ORAL 3 TIMES DAILY
Qty: 180 TABLET | Refills: 0 | Status: SHIPPED | OUTPATIENT
Start: 2022-01-10 | End: 2022-04-18

## 2022-01-10 RX ORDER — OXYCODONE AND ACETAMINOPHEN 5; 325 MG/1; MG/1
1 TABLET ORAL 2 TIMES DAILY
Qty: 30 TABLET | Refills: 0 | Status: SHIPPED | OUTPATIENT
Start: 2022-01-10 | End: 2022-04-18

## 2022-01-10 ASSESSMENT — PAIN SCALES - GENERAL: PAINLEVEL: WORST PAIN (10)

## 2022-01-10 NOTE — PROGRESS NOTES
"Nursing Notes:   Vee Lamb LPN  1/10/2022 10:41 AM  Signed  Patient presents to the clinic for follow up with herniated disc.    FOOD SECURITY SCREENING QUESTIONS:    The next two questions are to help us understand your food security.  If you are feeling you need any assistance in this area, we have resources available to support you today.    Hunger Vital Signs:  Within the past 12 months we worried whether our food would run out before we got money to buy more. Never  Within the past 12 months the food we bought just didn't last and we didn't have money to get more. Never    Advance Care Directive on file? no  Advance Care Directive provided to patient? Declined.    Chief Complaint   Patient presents with     Clinic Care Coordination - Follow-up       Initial BP (!) 144/100 (BP Location: Right arm, Patient Position: Sitting, Cuff Size: Adult Large)   Pulse 72   Temp 98.6  F (37  C) (Tympanic)   Resp 20   SpO2 98%  Estimated body mass index is 40.85 kg/m  as calculated from the following:    Height as of 12/24/21: 1.854 m (6' 1\").    Weight as of 12/28/21: 140.4 kg (309 lb 9.6 oz).  Medication Reconciliation: complete        Vee Lamb LPN            Assessment & Plan       ICD-10-CM    1. Cervical radiculopathy  M54.12 gabapentin (NEURONTIN) 600 MG tablet     Physical Therapy Referral     XR Cervical/Thoracic Epidural Inj Incl Imaging     oxyCODONE-acetaminophen (PERCOCET) 5-325 MG tablet       MRI showed severe left C7 nerve root compression consistent with symptoms in arm and left chest.  Symptoms are still uncontrolled.  Gabapentin helping slightly. He may increase to 1200 mg TID. No increase above this amount  Oxycodone as needed for pain up to twice daily, 30 pills provided  reviewed.     Recommend starting physical therapy. Previous referral not complete, placed referral again today    Due to significant discomfort and severe nerve compression, recommend cervical KULWINDER to see if he can " get some relief to participate in PT once able to schedule    Keep appointment arranged with Dr. Rasheed in February.     Follow up 2 weeks      Marques Veras MD     [facility] HOSPITAL      SUBJECTIVE:  41 year old male presents for follow up on cervical radiculopathy  Woke up 3 weeks ago with a stiff neck. Seen in ED twice for left chest pain, eventually felt to be a cervical radiculopathy with pain down arm  MRI showed severe left C7 nerve root compression  Given methylprednisolone taper without improvement in symptoms   Still has a lot of burning and electrical discomfort.   Started gabapentin, was to increase to 1200 mg in a day. He self escalated. Taking gabapentin 900 mg in the morning and 600 mg three times later in the day  Also using oxycodone as well 1 pill twice daily. Took 2 pills and felt too sedated.        REVIEW OF SYSTEMS:    Pertinent items are noted in HPI.    Current Outpatient Medications   Medication Sig Dispense Refill     gabapentin (NEURONTIN) 300 MG capsule Take 300 mg twice daily and 600 mg at bedtime 120 capsule 1     ibuprofen (ADVIL/MOTRIN) 200 MG tablet Take 800 mg by mouth every 6 hours as needed       oxyCODONE (ROXICODONE) 5 MG tablet Take 1-2 tablets (5-10 mg) by mouth every 6 hours as needed for pain 12 tablet 0     oxyCODONE-acetaminophen (PERCOCET) 5-325 MG tablet Take 1-2 tablets by mouth every 6 hours as needed for severe pain 30 tablet 0     Allergies   Allergen Reactions     Penicillins Hives     Other reaction(s): Edema     Codeine Itching and Rash       OBJECTIVE:  BP (!) 144/100 (BP Location: Right arm, Patient Position: Sitting, Cuff Size: Adult Large)   Pulse 72   Temp 98.6  F (37  C) (Tympanic)   Resp 20   SpO2 98%     EXAM:  General Appearance: Pleasant, alert, appropriate appearance for age. Uncomfortable, but no acute distress  Musculoskeletal Exam: Minimal tenderness over posterior neck.  Positive Spurling sign on the left.  Neurologic Exam:  reflexes 2+, strength appears symmetric upper extremities, but assessment limited by pain  Psychiatric: Normal affect and mentation

## 2022-01-10 NOTE — NURSING NOTE
"Patient presents to the clinic for follow up with herniated disc.    FOOD SECURITY SCREENING QUESTIONS:    The next two questions are to help us understand your food security.  If you are feeling you need any assistance in this area, we have resources available to support you today.    Hunger Vital Signs:  Within the past 12 months we worried whether our food would run out before we got money to buy more. Never  Within the past 12 months the food we bought just didn't last and we didn't have money to get more. Never    Advance Care Directive on file? no  Advance Care Directive provided to patient? Declined.    Chief Complaint   Patient presents with     Clinic Care Coordination - Follow-up       Initial BP (!) 144/100 (BP Location: Right arm, Patient Position: Sitting, Cuff Size: Adult Large)   Pulse 72   Temp 98.6  F (37  C) (Tympanic)   Resp 20   SpO2 98%  Estimated body mass index is 40.85 kg/m  as calculated from the following:    Height as of 12/24/21: 1.854 m (6' 1\").    Weight as of 12/28/21: 140.4 kg (309 lb 9.6 oz).  Medication Reconciliation: complete        Vee Lamb LPN       "

## 2022-01-10 NOTE — PATIENT INSTRUCTIONS
Increase gabapentin to 1200 mg three times daily. This is the maximum dose. Do not increase over this limit    Still use oxycodone up to twice daily to help pain    Referral to physical therapy    Referral for a steroid injection in the neck    Follow up in 2 weeks

## 2022-01-17 ENCOUNTER — TELEPHONE (OUTPATIENT)
Dept: FAMILY MEDICINE | Facility: OTHER | Age: 42
End: 2022-01-17
Payer: COMMERCIAL

## 2022-01-17 DIAGNOSIS — M54.12 CERVICAL RADICULOPATHY: ICD-10-CM

## 2022-01-17 NOTE — TELEPHONE ENCOUNTER
Reason for call: Medication or medication refill    Name of medication requested: Gabapentin    Are you out of the medication? No    What pharmacy do you use? NA    Preferred method for responding to this message: Telephone Call    Phone number patient can be reached at: Cell number on file:    Telephone Information:   Mobile [phone]       If we cannot reach you directly, may we leave a detailed response at the number you provided? Yes     PCP is gone-He would like a call-Can he take above medication with Ibuprofen?

## 2022-01-17 NOTE — TELEPHONE ENCOUNTER
Spoke with patient and he wanted to know if he could take Tylenol with his Gabapentin. Informed him that there is no indicated contraindication between the two meds. He wanted to know how much tylenol he can take per day. Informed him that he can take 1000mg every 6 hours as needed with a max of 4,000mg per day. He verbalized his understanding. Solomon Lazar RN, BSN  ....................  1/17/2022   11:36 AM

## 2022-02-01 ENCOUNTER — TELEPHONE (OUTPATIENT)
Dept: FAMILY MEDICINE | Facility: OTHER | Age: 42
End: 2022-02-01
Payer: COMMERCIAL

## 2022-02-01 NOTE — TELEPHONE ENCOUNTER
Received incoming form for work release status.  Patient has an appointment tomorrow with Marques Veras MD.      Vee Lamb LPN 2/1/2022 12:38 PM

## 2022-02-11 ENCOUNTER — HOSPITAL ENCOUNTER (OUTPATIENT)
Dept: GENERAL RADIOLOGY | Facility: OTHER | Age: 42
Discharge: HOME | End: 2022-02-11
Attending: FAMILY MEDICINE | Admitting: FAMILY MEDICINE
Payer: COMMERCIAL

## 2022-02-11 DIAGNOSIS — M54.12 CERVICAL RADICULOPATHY: ICD-10-CM

## 2022-02-11 PROCEDURE — 62321 NJX INTERLAMINAR CRV/THRC: CPT

## 2022-02-11 PROCEDURE — 250N000009 HC RX 250: Performed by: RADIOLOGY

## 2022-02-11 PROCEDURE — 250N000011 HC RX IP 250 OP 636: Performed by: RADIOLOGY

## 2022-02-11 PROCEDURE — 255N000002 HC RX 255 OP 636: Performed by: RADIOLOGY

## 2022-02-11 RX ORDER — LIDOCAINE HYDROCHLORIDE 10 MG/ML
5 INJECTION, SOLUTION INFILTRATION; PERINEURAL ONCE
Status: COMPLETED | OUTPATIENT
Start: 2022-02-11 | End: 2022-02-11

## 2022-02-11 RX ORDER — BETAMETHASONE SODIUM PHOSPHATE AND BETAMETHASONE ACETATE 3; 3 MG/ML; MG/ML
2 INJECTION, SUSPENSION INTRA-ARTICULAR; INTRALESIONAL; INTRAMUSCULAR; SOFT TISSUE ONCE
Status: COMPLETED | OUTPATIENT
Start: 2022-02-11 | End: 2022-02-11

## 2022-02-11 RX ADMIN — IOHEXOL 2 ML: 240 INJECTION, SOLUTION INTRATHECAL; INTRAVASCULAR; INTRAVENOUS; ORAL at 13:20

## 2022-02-11 RX ADMIN — LIDOCAINE HYDROCHLORIDE 1 ML: 10 INJECTION, SOLUTION INFILTRATION; PERINEURAL at 13:20

## 2022-02-11 RX ADMIN — BETAMETHASONE SODIUM PHOSPHATE AND BETAMETHASONE ACETATE 2 ML: 3; 3 INJECTION, SUSPENSION INTRA-ARTICULAR; INTRALESIONAL; INTRAMUSCULAR at 13:20

## 2022-02-14 DIAGNOSIS — M50.20 HERNIATED CERVICAL INTERVERTEBRAL DISC: Primary | ICD-10-CM

## 2022-02-15 ENCOUNTER — OFFICE VISIT (OUTPATIENT)
Dept: ORTHOPEDICS | Facility: OTHER | Age: 42
End: 2022-02-15
Attending: FAMILY MEDICINE
Payer: COMMERCIAL

## 2022-02-15 ENCOUNTER — HOSPITAL ENCOUNTER (OUTPATIENT)
Dept: GENERAL RADIOLOGY | Facility: OTHER | Age: 42
End: 2022-02-15
Attending: STUDENT IN AN ORGANIZED HEALTH CARE EDUCATION/TRAINING PROGRAM
Payer: COMMERCIAL

## 2022-02-15 DIAGNOSIS — M50.20 HERNIATED CERVICAL INTERVERTEBRAL DISC: ICD-10-CM

## 2022-02-15 DIAGNOSIS — M50.123 CERVICAL DISC DISORDER AT C6-C7 LEVEL WITH RADICULOPATHY: ICD-10-CM

## 2022-02-15 PROCEDURE — 72040 X-RAY EXAM NECK SPINE 2-3 VW: CPT

## 2022-02-15 PROCEDURE — G0463 HOSPITAL OUTPT CLINIC VISIT: HCPCS

## 2022-02-15 PROCEDURE — 99214 OFFICE O/P EST MOD 30 MIN: CPT | Performed by: STUDENT IN AN ORGANIZED HEALTH CARE EDUCATION/TRAINING PROGRAM

## 2022-02-15 NOTE — PROGRESS NOTES
Visit Date: 02/15/2022    HISTORY OF PRESENT ILLNESS:  Florencio is a 41-year-old male who presents for evaluation of severe left-sided radiating arm pain.  He reports back in December, he was moving a heavy object with the assistance of someone else and felt a strain.  He suddenly woke up the next day with severe and debilitating left-sided arm pain that led him going to the ER on [address].  He has subsequently attempted treatment with over-the-counter pain medications, prescription pain medications, and gabapentin.  He has had to discontinue gabapentin for negative side effects.  He has also been having chiropractic care, which gives him a couple hours' worth of lasting relief, but persistent symptoms.  He now has had progressive paresthesias and pain into his hand and some numbness and tingling in the left hand as well.  He has difficulty with any sort of significant motion of his neck, making any additional therapy prohibitive at this time.    PHYSICAL EXAMINATION:    GENERAL:  Well-appearing, well-nourished.   MUSCULOSKELETAL/NEUROLOGIC:  He has 5/5 strength in bilateral deltoid, biceps, triceps, wrist extension/flexion, hand , hand intrinsics.  He has normal sensation to light touch throughout bilateral upper extremities, aside for some patchy numbness of the left thumb and left index finger.    IMAGING:  Imaging available for review today includes an MRI of the cervical spine, which shows a large eccentric left disk herniation at the level of C6-C7 with significant left-sided thecal sac and left-sided neural foraminal narrowing.  He had flexion/extension films of the cervical spine obtained in clinic today, which shows no gross abnormal motion in flexed or extended position.    ASSESSMENT AND PLAN:  Florencio is a 41-year-old male with refractory left-sided arm pain, which is not refractory to injections, chiropractic care, and medication management with progressive paresthesias in the C7 nerve root  distribution.  Risks, benefits and alternatives were discussed with the patient.  He has elected to undergo a C6-C7 anterior cervical total disk replacement, which will be performed at [facility] after we get authorization.    This clinic visit took 45-59 minutes.    Walt Rasheed MD        D: 02/15/2022   T: 02/15/2022   MT: FORTINO    Name:     JUDY SAHNIAbelardo  MRN:      [id number]        Account:    [id number]   :      1980           Visit Date: 02/15/2022     Document: [id number]

## 2022-02-15 NOTE — PROGRESS NOTES
Patient is here for consult on his cervical spine.  Eden Santillan LPN .....................2/15/2022 11:15 AM

## 2022-02-16 ENCOUNTER — HOSPITAL ENCOUNTER (OUTPATIENT)
Facility: OTHER | Age: 42
End: 2022-02-16
Attending: STUDENT IN AN ORGANIZED HEALTH CARE EDUCATION/TRAINING PROGRAM | Admitting: STUDENT IN AN ORGANIZED HEALTH CARE EDUCATION/TRAINING PROGRAM
Payer: OTHER MISCELLANEOUS

## 2022-04-15 ENCOUNTER — TELEPHONE (OUTPATIENT)
Dept: ORTHOPEDICS | Facility: OTHER | Age: 42
End: 2022-04-15
Payer: COMMERCIAL

## 2022-04-15 DIAGNOSIS — M50.123 CERVICAL DISC DISORDER AT C6-C7 LEVEL WITH RADICULOPATHY: Primary | ICD-10-CM

## 2022-04-15 NOTE — TELEPHONE ENCOUNTER
Patient is having C6-7 Anterior total disc replacement DOS 4/28/2022. Needs OP PT to start 5/12/2022, 5/13/2022, or 5/16/2022

## 2022-04-18 ENCOUNTER — OFFICE VISIT (OUTPATIENT)
Dept: FAMILY MEDICINE | Facility: OTHER | Age: 42
End: 2022-04-18
Attending: FAMILY MEDICINE
Payer: COMMERCIAL

## 2022-04-18 VITALS
HEIGHT: 71 IN | WEIGHT: 315 LBS | RESPIRATION RATE: 18 BRPM | SYSTOLIC BLOOD PRESSURE: 140 MMHG | DIASTOLIC BLOOD PRESSURE: 86 MMHG | OXYGEN SATURATION: 98 % | HEART RATE: 72 BPM | TEMPERATURE: 98.2 F | BODY MASS INDEX: 44.1 KG/M2

## 2022-04-18 DIAGNOSIS — F17.200 TOBACCO USE DISORDER: ICD-10-CM

## 2022-04-18 DIAGNOSIS — Z01.818 PREOP GENERAL PHYSICAL EXAM: Primary | ICD-10-CM

## 2022-04-18 DIAGNOSIS — M54.12 CERVICAL RADICULOPATHY: ICD-10-CM

## 2022-04-18 PROCEDURE — G0463 HOSPITAL OUTPT CLINIC VISIT: HCPCS | Mod: 25

## 2022-04-18 PROCEDURE — 99214 OFFICE O/P EST MOD 30 MIN: CPT | Performed by: FAMILY MEDICINE

## 2022-04-18 PROCEDURE — G0463 HOSPITAL OUTPT CLINIC VISIT: HCPCS

## 2022-04-18 ASSESSMENT — PAIN SCALES - GENERAL: PAINLEVEL: SEVERE PAIN (6)

## 2022-04-18 NOTE — PROGRESS NOTES
[facility] [place]  [address]  [place] [address]  Phone: [phone]  Fax: [phone]  Primary Provider: aMrques Veras  Pre-op Performing Provider: MARQUES VERAS      PREOPERATIVE EVALUATION:  Today's date: 4/18/2022    Matt May is a 41 year old male who presents for a preoperative evaluation.    Surgical Information:  Surgery/Procedure:CERVICAL 6-7 ANTERIOR TOTAL DISC REPLACEMENT   Surgery Location: [facility]  Surgeon: Dr. Rasheed  Surgery Date: 4-  Time of Surgery: unknown  Where patient plans to recover: At home with family  Fax number for surgical facility: Note does not need to be faxed, will be available electronically in Epic.    Type of Anesthesia Anticipated: General    Assessment & Plan     The proposed surgical procedure is considered INTERMEDIATE risk.      ICD-10-CM    1. Preop general physical exam  Z01.818    2. Cervical radiculopathy  M54.12    3. Tobacco use disorder  F17.200      Risks and Recommendations:  The patient has the following additional risks and recommendations for perioperative complications:   - No identified additional risk factors other than previously addressed    Medication Instructions:  Hold ibuprofen for 3 full days prior to spine surgery.  He may use acetaminophen for pain.    Recommend quitting any nicotine use to help with healing after surgery.  He expressed interest in not using any chewing tobacco while recovering from surgery.    RECOMMENDATION:  APPROVAL GIVEN to proceed with proposed procedure, without further diagnostic evaluation.    33 minutes spent on the date of the encounter doing chart review, patient visit and documentation     Marques Veras MD   56}    Subjective     HPI related to upcoming procedure: 41 year old male nicotine user (chew) here for preop physical prior to cervical disc replacement    He initially presented to the ED in December 2021 for chest pain, but eventually diagnosed with a cervical  radiculopathy confirmed with MRI showing severe left C7 nerve root compression.  Had slight relief with cervical KULWINDER 2/11/2022  Gabapentin helped slightly, but then caused side effects.  Stopped taking oxycodone.  Ibuprofen provides benefit.      Preop Questions 4/18/2022   1. Have you ever had a heart attack or stroke? No   2. Have you ever had surgery on your heart or blood vessels, such as a stent placement, a coronary artery bypass, or surgery on an artery in your head, neck, heart, or legs? No   3. Do you have chest pain with activity? YES - musculoskeletal, similar to when he lays on it wrong   4. Do you have a history of  heart failure? No   5. Do you currently have a cold, bronchitis or symptoms of other infection? No   6. Do you have a cough, shortness of breath, or wheezing? No   7. Do you or anyone in your family have previous history of blood clots? No   8. Do you or does anyone in your family have a serious bleeding problem such as prolonged bleeding following surgeries or cuts? No   9. Have you ever had problems with anemia or been told to take iron pills? No   10. Have you had any abnormal blood loss such as black, tarry or bloody stools? No   11. Have you ever had a blood transfusion? No   12. Are you willing to have a blood transfusion if it is medically needed before, during, or after your surgery? Yes   13. Have you or any of your relatives ever had problems with anesthesia? No   14. Do you have sleep apnea, excessive snoring or daytime drowsiness? No   15. Do you have any artifical heart valves or other implanted medical devices like a pacemaker, defibrillator, or continuous glucose monitor? No   16. Do you have artificial joints? No   17. Are you allergic to latex? No       Preoperative Review of :   reviewed - received oxycodone, not taking      Review of Systems  General: Denies general constitutional problems  Cardiovascular: Denies problems  Respiratory: Denies problems      Patient  Active Problem List    Diagnosis Date Noted     Morbid obesity (H) 2021     Priority: Medium     Tear of talofibular ligament of left lower extremity, sequela 2018     Priority: Medium     Adhesive capsulitis of left shoulder 2016     Priority: Medium     Tendinitis of left rotator cuff 2016     Priority: Medium     Backache 2012     Priority: Medium     Pain in joint, lower leg 2012     Priority: Medium     Obesity 2012     Priority: Medium     Tobacco use disorder 2012     Priority: Medium     Cannabis abuse 2010     Priority: Medium     Esophageal reflux 2010     Priority: Medium     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  Negative chest x-ray.  Started on isoniazid.        Past Medical History:   Diagnosis Date     Adhesive capsulitis of left shoulder     2016     Gastro-esophageal reflux disease without esophagitis     No Comments Provided     Nonspecific reaction to tuberculin skin test without active tuberculosis     ,negative chest x-ray. Completed isoniazid.     Other shoulder lesions, left shoulder     2016     Past Surgical History:   Procedure Laterality Date     APPENDECTOMY OPEN           Current Outpatient Medications   Medication Sig Dispense Refill     gabapentin (NEURONTIN) 600 MG tablet Take 2 tablets (1,200 mg) by mouth 3 times daily 180 tablet 0     ibuprofen (ADVIL/MOTRIN) 200 MG tablet Take 800 mg by mouth every 6 hours as needed       oxyCODONE-acetaminophen (PERCOCET) 5-325 MG tablet Take 1 tablet by mouth 2 times daily 30 tablet 0       Allergies   Allergen Reactions     Penicillins Hives     Other reaction(s): Edema     Codeine Itching and Rash        Social History     Tobacco Use     Smoking status: Former Smoker     Packs/day: 5.00     Quit date: 10/23/2021     Years since quittin.4     Smokeless tobacco:  "Current User     Types: Chew   Substance Use Topics     Alcohol use: Not Currently     Alcohol/week: 0.0 standard drinks     Comment: rare     Family History   Problem Relation Age of Onset     Diabetes Father      Other - See Comments Father          of a staph infection     History   Drug Use     Types: Marijuana         Objective     BP (!) 140/86   Pulse 72   Temp 98.2  F (36.8  C) (Tympanic)   Resp 18   Ht 1.803 m (5' 11\")   Wt (!) 152.4 kg (336 lb)   SpO2 98%   BMI 46.86 kg/m      Physical Exam  General Appearance: Pleasant, alert, appropriate appearance for age. No acute distress  Ear Exam: Normal TM's bilaterally.   OroPharynx Exam: Dental hygiene adequate. Normal buccal mucosa. Normal pharynx. Mallampati 2/4.  Neck Exam: Supple, no masses or nodes.  Chest/Respiratory Exam: Normal chest wall and respirations. Clear to auscultation.  Cardiovascular Exam: Regular rate and rhythm. S1, S2, no murmur, click, gallop, or rubs.  Extremities: 2 + pedal pulses.  No lower extremity edema.  Neurologic Exam: Nonfocal; symmetric DTRs, normal gross motor movement, tone, and coordination. No tremor.   Psychiatric: Normal affect and mentation      Recent Labs   Lab Test 21  0728 21  0132   HGB 16.9 15.5    199    138   POTASSIUM 3.9 4.0   CR 0.89 0.95        Diagnostics:  No labs or EKG warranted       Revised Cardiac Risk Index (RCRI):  The patient has the following serious cardiovascular risks for perioperative complications:   - No serious cardiac risks = 0 points     RCRI Interpretation: 0 points: Class I (very low risk - 0.4% complication rate)           Signed Electronically by: Marques Veras MD  Copy of this evaluation report is provided to requesting physician.    "

## 2022-04-18 NOTE — PATIENT INSTRUCTIONS
Avoid ibuprofen (Advil, Motrin), naproxen (Aleve) or aspirin after 4/23  Tylenol (acetaminophen) is okay

## 2022-04-18 NOTE — NURSING NOTE
"Patient presents to the clinic for pre-op exam.    FOOD SECURITY SCREENING QUESTIONS:    The next two questions are to help us understand your food security.  If you are feeling you need any assistance in this area, we have resources available to support you today.    Hunger Vital Signs:  Within the past 12 months we worried whether our food would run out before we got money to buy more. Never  Within the past 12 months the food we bought just didn't last and we didn't have money to get more. Never    Advance Care Directive on file? no  Advance Care Directive provided to patient? Declined.    Chief Complaint   Patient presents with     Pre-Op Exam       Initial BP (!) 140/86 (BP Location: Right arm, Patient Position: Sitting, Cuff Size: Adult Large)   Pulse 72   Temp 98.2  F (36.8  C) (Tympanic)   Resp 18   Ht 1.803 m (5' 11\")   Wt (!) 152.4 kg (336 lb)   SpO2 98%   BMI 46.86 kg/m   Estimated body mass index is 46.86 kg/m  as calculated from the following:    Height as of this encounter: 1.803 m (5' 11\").    Weight as of this encounter: 152.4 kg (336 lb).  Medication Reconciliation: complete        Vee Lamb LPN       "

## 2022-04-25 ENCOUNTER — ALLIED HEALTH/NURSE VISIT (OUTPATIENT)
Dept: FAMILY MEDICINE | Facility: OTHER | Age: 42
End: 2022-04-25
Attending: FAMILY MEDICINE
Payer: COMMERCIAL

## 2022-04-25 DIAGNOSIS — Z20.822 COVID-19 RULED OUT: Primary | ICD-10-CM

## 2022-04-25 PROCEDURE — C9803 HOPD COVID-19 SPEC COLLECT: HCPCS

## 2022-04-25 PROCEDURE — U0005 INFEC AGEN DETEC AMPLI PROBE: HCPCS | Mod: ZL

## 2022-04-25 NOTE — PROGRESS NOTES
Patient here today for Covid test. Procedure 4/28/22.     Myrna Cabrera CNA .............................on 4/25/2022 at 11:10 AM

## 2022-04-26 ENCOUNTER — TELEPHONE (OUTPATIENT)
Dept: FAMILY MEDICINE | Facility: OTHER | Age: 42
End: 2022-04-26
Payer: COMMERCIAL

## 2022-04-26 ENCOUNTER — TELEPHONE (OUTPATIENT)
Dept: NURSING | Facility: CLINIC | Age: 42
End: 2022-04-26
Payer: COMMERCIAL

## 2022-04-26 LAB — SARS-COV-2 RNA RESP QL NAA+PROBE: POSITIVE

## 2022-04-26 NOTE — TELEPHONE ENCOUNTER
Patient returned call and he verified his last name and . He was informed of test still in process, and verbalized plan to call back again tomorrow, as he has procedure scheduled on . Morena Davila RN .............. 2022  3:04 PM

## 2022-04-26 NOTE — TELEPHONE ENCOUNTER
Covid test still in process. Left message on machine to call back. Morena Davila RN .............. 4/26/2022  2:39 PM

## 2022-04-26 NOTE — TELEPHONE ENCOUNTER
Coronavirus (COVID-19) Notification    Caller Name (Patient, parent, daughter/son, grandparent, etc)  Patient    Reason for call  Notify of Positive Coronavirus (COVID-19) lab results, assess symptoms,  review [facility] recommendations    Lab Result    Lab test:  2019-nCoV rRt-PCR or SARS-CoV-2 PCR    Oropharyngeal AND/OR nasopharyngeal swabs is POSITIVE for 2019-nCoV RNA/SARS-COV-2 PCR (COVID-19 virus)      Gather patient reported symptoms   Assessment   Current Symptoms at time of phone call, reported by patient: (if no symptoms, document: No symptoms] Stuffy nose   Date of symptom(s) onset (if applicable) 4/23/2022     If at time of call, Patients symptoms have worsened, the Patient should contact 911 or have someone drive them to Emergency Dept promptly:      If Patient calling 911, inform 911 personal that you have tested positive for the Coronavirus (COVID-19).  Place mask on and await 911 to arrive.    If Emergency Dept, If possible, please have another adult drive you to the Emergency Dept but you need to wear mask when in contact with other people.      Treatment Options:   Patient classified as COVID treatment eligible by Epic high risk algorithm: Yes  Is the patient symptomatic at the time of result notification? Yes. Was the onset of symptoms within the last 5 days? No. Was the onset of symptoms within the last 7 days? Yes  Is the patient interested in a visit with a provider to discuss treatment options?: No.  Reason patient declined:  Not that sick and don't think I will get worse (save for people who possibly need it more)      Review information with Patient    Your result was positive. This means you have COVID-19 (coronavirus).    How can I protect others?    These guidelines are for isolating before returning to work, school or .    If you DO have symptoms    Stay home and away from others     For at least 5 days after your symptoms started, AND    You are fever free for 24 hours  (with no medicine that reduces fever), AND    Your other symptoms are better    Wear a mask for 10 full days anytime you are around others    If you DON'T have symptoms    Stay home and away from others for at least 5 days after your positive test    Wear a mask for 10 full days anytime you are around others    There may be different guidelines for healthcare facilities.  Please check with the specific sites before arriving.    If you have been told by a doctor that you were severely ill with COVID-19 or are immunocompromised, you should isolate for at least 10 days.    You should not go back to work until you meet the guidelines above for ending your home isolation. You don't need to be retested for COVID-19 before going back to work--studies show that you won't spread the virus if it's been at least 10 days since your symptoms started (or 20 days, if you have a weak immune system).    Employers, schools, and daycares: This is an official notice for this person's medical guidelines for returning in-person.  They must meet the above guidelines before going back to work, school or  in person.    You will receive a positive COVID-19 letter via [employer] or the mail soon with additional self-care information (exception, no letters will be sent to presurgical/preprocedure patients).    Would you like me to review some of that information with you now?  No    If you were tested for an upcoming procedure, please contact your provider for next steps.    Vaishali Dominguez LPN

## 2022-05-06 ENCOUNTER — TELEPHONE (OUTPATIENT)
Dept: FAMILY MEDICINE | Facility: OTHER | Age: 42
End: 2022-05-06
Payer: COMMERCIAL

## 2022-05-06 DIAGNOSIS — M50.123 CERVICAL DISC DISORDER AT C6-C7 LEVEL WITH RADICULOPATHY: Primary | ICD-10-CM

## 2022-05-06 NOTE — TELEPHONE ENCOUNTER
OK, I see his surgery was cancelled. If he is planning surgery soon should clear injection with spine surgeon first in case it delays any surgery.

## 2022-05-17 NOTE — PROGRESS NOTES
[facility]  [address]  [place] [address]  Phone: [phone]  Fax: [phone]  Primary Provider: Marques Veras  Pre-op Performing Provider: MARQUES VERAS      PREOPERATIVE EVALUATION:  Today's date: 5/18/2022    Matt May is a 41 year old male who presents for a preoperative evaluation.    Surgical Information:  Surgery/Procedure:CERVICAL 6-7 ANTERIOR TOTAL DISC REPLACEMENT   Surgery Location: [facility], [place], operated by [facility]  Surgeon: Dr. Rasheed  Surgery Date: 5-  Time of Surgery: unknown  Where patient plans to recover: At home with family  Fax number for surgical facility: [phone]    Type of Anesthesia Anticipated: General    Assessment & Plan     The proposed surgical procedure is considered INTERMEDIATE risk.      ICD-10-CM    1. Preop general physical exam  Z01.818    2. Cervical disc disorder at C6-C7 level with radiculopathy  M50.123    3. Tobacco use disorder  F17.200           Risks and Recommendations:  The patient has the following additional risks and recommendations for perioperative complications:   - No identified additional risk factors other than previously addressed     Medication Instructions:  Hold ibuprofen for 3 full days prior to spine surgery.  He may use acetaminophen for pain.     Recommend quitting any nicotine use to help with healing after surgery.  He expressed interest in not using any chewing tobacco while recovering from surgery.     RECOMMENDATION:  APPROVAL GIVEN to proceed with proposed procedure, without further diagnostic evaluation.      Marques Veras MD   956}    Subjective     HPI related to upcoming procedure: 41 year old male nicotine user (chew) here for preop physical prior to cervical disc replacement     He initially presented to the ED in December 2021 for chest pain, but eventually diagnosed with a cervical radiculopathy confirmed with MRI showing severe left C7 nerve root compression  Had slight relief with cervical  KULWINDER 2/11/2022  Scheduled for surgery 4/28, but positive for COVID (he reports for the 4th time)  Only had nasal congestion. No cough or SOB. No further symptoms at this time      Preop Questions 5/18/2022   1. Have you ever had a heart attack or stroke? No   2. Have you ever had surgery on your heart or blood vessels, such as a stent placement, a coronary artery bypass, or surgery on an artery in your head, neck, heart, or legs? No   3. Do you have chest pain with activity? No   4. Do you have a history of  heart failure? No   5. Do you currently have a cold, bronchitis or symptoms of other infection? No   6. Do you have a cough, shortness of breath, or wheezing? No   7. Do you or anyone in your family have previous history of blood clots? No   8. Do you or does anyone in your family have a serious bleeding problem such as prolonged bleeding following surgeries or cuts? No   9. Have you ever had problems with anemia or been told to take iron pills? No   10. Have you had any abnormal blood loss such as black, tarry or bloody stools? No   11. Have you ever had a blood transfusion? No   12. Are you willing to have a blood transfusion if it is medically needed before, during, or after your surgery? Yes   13. Have you or any of your relatives ever had problems with anesthesia? No   14. Do you have sleep apnea, excessive snoring or daytime drowsiness? No   15. Do you have any artifical heart valves or other implanted medical devices like a pacemaker, defibrillator, or continuous glucose monitor? No   16. Do you have artificial joints? No   17. Are you allergic to latex? No     Health Care Directive:  Patient does not have a Health Care Directive or Living Will: Discussed advance care planning with patient; however, patient declined at this time.    Preoperative Review of :   reviewed - no record of controlled substances prescribed. Last prescription in Jan 2022      Review of Systems  General: Denies general  constitutional problems  Cardiovascular: Denies problems  Respiratory: Denies problems     Patient Active Problem List    Diagnosis Date Noted     Morbid obesity (H) 2021     Priority: Medium     Tear of talofibular ligament of left lower extremity, sequela 2018     Priority: Medium     Adhesive capsulitis of left shoulder 2016     Priority: Medium     Tendinitis of left rotator cuff 2016     Priority: Medium     Backache 2012     Priority: Medium     Pain in joint, lower leg 2012     Priority: Medium     Obesity 2012     Priority: Medium     Tobacco use disorder 2012     Priority: Medium     Cannabis abuse 2010     Priority: Medium     Esophageal reflux 2010     Priority: Medium     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  Negative chest x-ray.  Started on isoniazid.        Past Medical History:   Diagnosis Date     Adhesive capsulitis of left shoulder     2016     Gastro-esophageal reflux disease without esophagitis     No Comments Provided     Nonspecific reaction to tuberculin skin test without active tuberculosis     ,negative chest x-ray. Completed isoniazid.     Other shoulder lesions, left shoulder     2016     Past Surgical History:   Procedure Laterality Date     ANKLE SURGERY Left 2018     APPENDECTOMY OPEN           Current Outpatient Medications   Medication Sig Dispense Refill     ibuprofen (ADVIL/MOTRIN) 200 MG tablet Take 800 mg by mouth every 6 hours as needed         Allergies   Allergen Reactions     Penicillins Hives     Other reaction(s): Edema     Codeine Itching and Rash        Social History     Tobacco Use     Smoking status: Former Smoker     Packs/day: 5.00     Quit date: 10/23/2021     Years since quittin.5     Smokeless tobacco: Current User     Types: Chew   Substance Use Topics     Alcohol use: Not  "Currently     Alcohol/week: 0.0 standard drinks     Comment: rare     Family History   Problem Relation Age of Onset     Diabetes Father      Other - See Comments Father          of a staph infection     History   Drug Use     Types: Marijuana         Objective     BP (!) 140/82 (BP Location: Right arm, Patient Position: Sitting, Cuff Size: Adult Large)   Pulse 84   Temp 98.9  F (37.2  C) (Tympanic)   Resp 20   Ht 1.803 m (5' 11\")   Wt (!) 151.5 kg (334 lb)   SpO2 96%   BMI 46.58 kg/m      Physical Exam  General Appearance: Pleasant, alert, appropriate appearance for age. No acute distress  Ear Exam: Normal TM's bilaterally.   OroPharynx Exam: Dental hygiene adequate. Normal buccal mucosa. Normal pharynx. Mallampati 2/4.  Neck Exam: Supple, no masses or nodes.  Chest/Respiratory Exam: Normal chest wall and respirations. Clear to auscultation.  Cardiovascular Exam: Regular rate and rhythm. S1, S2, no murmur, click, gallop, or rubs.  Extremities: 2 + pedal pulses.  No lower extremity edema.  Neurologic Exam: Nonfocal; symmetric DTRs, normal gross motor movement, tone, and coordination. No tremor.   Psychiatric: Normal affect and mentation      Recent Labs   Lab Test 21  0728 21  0132   HGB 16.9 15.5    199    138   POTASSIUM 3.9 4.0   CR 0.89 0.95        Diagnostics:  No labs or EKG necessary       Revised Cardiac Risk Index (RCRI):  The patient has the following serious cardiovascular risks for perioperative complications:   - No serious cardiac risks = 0 points     RCRI Interpretation: 0 points: Class I (very low risk - 0.4% complication rate)           Signed Electronically by: Marques Veras MD  Copy of this evaluation report is provided to requesting physician.      "

## 2022-05-18 ENCOUNTER — OFFICE VISIT (OUTPATIENT)
Dept: FAMILY MEDICINE | Facility: OTHER | Age: 42
End: 2022-05-18
Attending: FAMILY MEDICINE
Payer: COMMERCIAL

## 2022-05-18 VITALS
SYSTOLIC BLOOD PRESSURE: 140 MMHG | RESPIRATION RATE: 20 BRPM | OXYGEN SATURATION: 96 % | HEIGHT: 71 IN | BODY MASS INDEX: 44.1 KG/M2 | TEMPERATURE: 98.9 F | WEIGHT: 315 LBS | DIASTOLIC BLOOD PRESSURE: 82 MMHG | HEART RATE: 84 BPM

## 2022-05-18 DIAGNOSIS — Z01.818 PREOP GENERAL PHYSICAL EXAM: Primary | ICD-10-CM

## 2022-05-18 DIAGNOSIS — F17.200 TOBACCO USE DISORDER: ICD-10-CM

## 2022-05-18 DIAGNOSIS — M50.123 CERVICAL DISC DISORDER AT C6-C7 LEVEL WITH RADICULOPATHY: ICD-10-CM

## 2022-05-18 PROCEDURE — G0463 HOSPITAL OUTPT CLINIC VISIT: HCPCS

## 2022-05-18 PROCEDURE — 99213 OFFICE O/P EST LOW 20 MIN: CPT | Performed by: FAMILY MEDICINE

## 2022-05-18 PROCEDURE — G0463 HOSPITAL OUTPT CLINIC VISIT: HCPCS | Mod: 25

## 2022-05-18 ASSESSMENT — PAIN SCALES - GENERAL: PAINLEVEL: MODERATE PAIN (5)

## 2022-05-18 NOTE — NURSING NOTE
"Patient presents to the clinic for pre-op exam.    Chief Complaint   Patient presents with     Pre-Op Exam       Initial BP (!) 140/82 (BP Location: Right arm, Patient Position: Sitting, Cuff Size: Adult Large)   Pulse 84   Temp 98.9  F (37.2  C) (Tympanic)   Resp 20   Ht 1.803 m (5' 11\")   Wt (!) 151.5 kg (334 lb)   SpO2 96%   BMI 46.58 kg/m   Estimated body mass index is 46.58 kg/m  as calculated from the following:    Height as of this encounter: 1.803 m (5' 11\").    Weight as of this encounter: 151.5 kg (334 lb).  Medication Reconciliation: complete        Vee Lamb LPN    "

## 2022-06-17 DIAGNOSIS — Z98.890 S/P CERVICAL DISC REPLACEMENT: Primary | ICD-10-CM

## 2022-06-20 ENCOUNTER — HOSPITAL ENCOUNTER (OUTPATIENT)
Dept: GENERAL RADIOLOGY | Facility: OTHER | Age: 42
Discharge: HOME | End: 2022-06-20
Attending: STUDENT IN AN ORGANIZED HEALTH CARE EDUCATION/TRAINING PROGRAM
Payer: OTHER MISCELLANEOUS

## 2022-06-20 ENCOUNTER — OFFICE VISIT (OUTPATIENT)
Dept: ORTHOPEDICS | Facility: OTHER | Age: 42
End: 2022-06-20
Attending: STUDENT IN AN ORGANIZED HEALTH CARE EDUCATION/TRAINING PROGRAM
Payer: OTHER MISCELLANEOUS

## 2022-06-20 VITALS
BODY MASS INDEX: 41.84 KG/M2 | HEART RATE: 78 BPM | WEIGHT: 300 LBS | RESPIRATION RATE: 16 BRPM | OXYGEN SATURATION: 96 %

## 2022-06-20 DIAGNOSIS — Z98.890 S/P CERVICAL DISC REPLACEMENT: ICD-10-CM

## 2022-06-20 DIAGNOSIS — Z98.890 S/P CERVICAL DISC REPLACEMENT: Primary | ICD-10-CM

## 2022-06-20 PROCEDURE — 72040 X-RAY EXAM NECK SPINE 2-3 VW: CPT

## 2022-06-20 PROCEDURE — 99213 OFFICE O/P EST LOW 20 MIN: CPT | Performed by: STUDENT IN AN ORGANIZED HEALTH CARE EDUCATION/TRAINING PROGRAM

## 2022-06-20 PROCEDURE — G0463 HOSPITAL OUTPT CLINIC VISIT: HCPCS

## 2022-06-20 RX ORDER — METHOCARBAMOL 750 MG/1
TABLET, FILM COATED ORAL
COMMUNITY
Start: 2022-06-07 | End: 2023-05-08

## 2022-06-20 RX ORDER — OXYCODONE HYDROCHLORIDE 5 MG/1
TABLET ORAL
COMMUNITY
Start: 2022-06-07 | End: 2022-11-29

## 2022-06-20 ASSESSMENT — PAIN SCALES - GENERAL: PAINLEVEL: SEVERE PAIN (7)

## 2022-06-20 NOTE — PROGRESS NOTES
Visit Date: 2022    POSTOPERATIVE VISIT    HISTORY OF PRESENT ILLNESS:  Florencio is a 41-year-old male who is approximately 2 weeks out from a C6-7 anterior cervical total disk replacement.  He has done well in the early postoperative period with the expected amount of parascapular postoperative pain but improvement in his radiating arm pain and his baseline neck pain.    PHYSICAL EXAM:  General:  Well-appearing, well-nourished.   MUSCULOSKELETAL/NEUROLOGIC:  He has 5/5 strength in bilateral deltoid, biceps, triceps, wrist extension, flexion, hand , hand intrinsics.  He has normal sensation to light touch throughout bilateral upper extremities.    IMAGING:  Imaging available for review today includes AP and lateral radiographs of the cervical spine, which shows adequate positioning of his hardware and no evidence of early hardware failure or migration.    ASSESSMENT AND PLAN:  Florencio is a 41-year-old male, 2 weeks out from a C6-7 anterior cervical total disk replacement, doing well in the early postoperative period.  He is going to begin his physical therapy 2-3 days a week for the next month and return to clinic in 1 month for routine evaluation.    This clinic visit took 15-29 minutes.    Walt Rasheed MD        D: 2022   T: 2022   MT: PAKMT    Name:     JUDY SAHNI  MRN:      [id number]        Account:    [id number]   :      1980           Visit Date: 2022     Document: [id number]

## 2022-06-20 NOTE — NURSING NOTE
"Chief Complaint   Patient presents with     Post-op Back     C6-7 TDR, DOS: 6/7/22         Initial Pulse 78   Resp 16   Wt 136.1 kg (300 lb)   SpO2 96%   BMI 41.84 kg/m   Estimated body mass index is 41.84 kg/m  as calculated from the following:    Height as of 5/18/22: 1.803 m (5' 11\").    Weight as of this encounter: 136.1 kg (300 lb).       Medication Reconciliation: Complete    Mere Bland LPN .......  6/20/2022  10:42 AM   "

## 2022-06-20 NOTE — PROGRESS NOTES
2 weeks s/p C6-7 TDR   Daughter called regarding his remote monitoring.  He is now at the [facility] until 12-24.  Please return her call.

## 2022-06-20 NOTE — LETTER
[facility]  [address]  [place] [address]  Phone: [phone]  Fax: [phone]    June 20, 2022        Matt May  [address]  [facility] [address]          To whom it may concern:    RE: Matt May      Patient was seen and treated today at our clinic and is to be out of work at this time while completing post-operative physical therapy.      Please contact me for questions or concerns.      Sincerely,        Walt Rasheed MD

## 2022-07-13 ENCOUNTER — HOSPITAL ENCOUNTER (OUTPATIENT)
Dept: PHYSICAL THERAPY | Facility: OTHER | Age: 42
Setting detail: THERAPIES SERIES
Discharge: HOME | End: 2022-07-13
Attending: STUDENT IN AN ORGANIZED HEALTH CARE EDUCATION/TRAINING PROGRAM
Payer: OTHER MISCELLANEOUS

## 2022-07-13 DIAGNOSIS — M50.123 CERVICAL DISC DISORDER AT C6-C7 LEVEL WITH RADICULOPATHY: ICD-10-CM

## 2022-07-13 PROCEDURE — 97161 PT EVAL LOW COMPLEX 20 MIN: CPT | Mod: GP | Performed by: PHYSICAL THERAPIST

## 2022-07-13 PROCEDURE — 97110 THERAPEUTIC EXERCISES: CPT | Mod: GP | Performed by: PHYSICAL THERAPIST

## 2022-07-13 NOTE — PROGRESS NOTES
07/13/22 0900   General Information   Type of Visit Initial OP Ortho PT Evaluation   Start of Care Date 07/13/22   Referring Physician ASHER Lee   Orders Evaluate and Treat   Date of Order 04/18/22   Certification Required? No   Medical Diagnosis cervical disc disorder at C6/7 level with radiculopathy   Surgical/Medical history reviewed Yes   Precautions/Limitations   (post-operative)   General Information Comments s/p C6/7 anterior total disc replacement   Body Part(s)   Body Part(s) Cervical Spine;Shoulder   Presentation and Etiology   Pertinent history of current problem (include personal factors and/or comorbidities that impact the POC) Initial injury in late December 2021.  Was seen by Dr. Rasheed 2/15/22.  Surgery initially planned for late April 2022, but was rescheduled due to patient testing positive for COVID.  DOS: 6/7/22.  Currently on restrictions of no lifting more than 5lb, no working, and only walking for the first 2 weeks.  Patient rates pain 5.5/10 right now.  Worse if looking down and turning or tilting head to the right.  Rolling in bed hurts. Cannot bend forward to scrub feet.  Cannot help with household tasks at this point.  Increased pain with attempt to hold grandson while seated.   Sends a burning sensation into fingers.  Gets a pinching sensation also.  Feels like a knife in the middle of neck and back.  Head feels wobbly.  Hears crunching sounds in the neck with head motion.  Has been able to drive safely.  Also feels discomfort in throat area with some difficulty swallowing and dryness/tickling in the throat.  Coughs occasionally, but nothing comes up.  Does not feel COVID related.  All since surgery.  Since surgery, radicular symptoms are better, but not there are different symptoms.  Numbness is gone in fingers, but now it feels like it's burning.   Impairments A. Pain;B. Decreased WB tolerance;E. Decreased flexibility;F. Decreased strength and endurance;J. Burning;K. Numbness;L.  "Tingling   Functional Limitations perform required work activities;perform desired leisure / sports activities   Onset date of current episode/exacerbation 06/07/22   Chronicity   (chronic with recent surgery)   Pain rating (0-10 point scale) Worst (/10)   Worst (/10) 9/10   Pain quality A. Sharp;C. Aching;D. Burning;F. Stabbing   Pain/symptoms eased by D. Nothing  (tried ice initially, then heat)   Current Level of Function   Patient role/employment history A. Employed   Employment Comments full time at [employer] - Not working at this time.   Living environment [facility]   Fall Risk Screen   Fall screen completed by PT   Have you fallen 2 or more times in the past year? No   Have you fallen and had an injury in the past year? No   Is patient a fall risk? No   Cervical Spine   Cervical Left Side Bending ROM 13, \"feels like its going to pop\", tight and stiffness   Cervical Right Rotation ROM 15   Cervical Left Rotation ROM 25   Cervical Flexion ROM 21, pinching at base of neck   Cervical Extension ROM 31, pulling at base of neck   Cervical Right Side Bending ROM 11, \"feels like it's going to pop\"   Thoracic Flexion ROM WFL   Thoracic Extension ROM WFL, tightness and discomfort at base of neck   Thoracic Right Side Bending ROM WFL   Thoracic Left Sidebending ROM  WFL   Thoracic Right Rotation WFL, tightness at base of neck   Thoracic Left Rotation WFL, tightness at base of neck   Palpation Pain at base of neck and across bilateral upper trapezius.  Pain along bilateral mid and posterior scalenes and mid-muscle belly area of bilateral SCMs.  Minimal discomfort along rhomboids, or scapular muscles.   Integumentary  right sided anterior neck scar, dark pink   Posture mild scapular protraction and depression   Shoulder Objective Findings   Side (if bilateral, select both right and left) Right;Left   Right Shoulder Flexion AROM 145, tightness in base of neck   Left Shoulder Flexion AROM 145, " "tightness in base of neck   Left Shoulder Flexion PROM 152, \"clunk\" in neck around 120 degrees of UE ABD   Right Shoulder Abduction AROM 125, \"clunk\" in neck around 120 degrees of UE ABD   Right Shoulder ER AROM WFL   Left Shoulder ER AROM WFL   Right Shoulder IR PROM 50, no pain, but a \"clunk\" in neck when am is moved back to neutral   Left Shoulder IR AROM 65, no pain, but a \"clunk\"  in neck when arm is moved back to neutral   Planned Therapy Interventions   Planned Therapy Interventions manual therapy;ROM;strengthening;stretching   Planned Modality Interventions   Planned Modality Interventions Cryotherapy;Hot packs;Ultrasound   Clinical Impression   Criteria for Skilled Therapeutic Interventions Met yes, treatment indicated   PT Diagnosis impaired UE and neck mobility   Influenced by the following impairments impaired ROM, flexibility, strength; Pain; Inflammation;   Functional limitations due to impairments reaching, lifting/carrying, push/pull, home and yard tasks, work tasks   Clinical Presentation Evolving/Changing   Clinical Presentation Rationale clinical observation   Clinical Decision Making (Complexity) Low complexity   Therapy Frequency   (12 visits)   Predicted Duration of Therapy Intervention (days/wks) 2 months   Risk & Benefits of therapy have been explained Yes   Patient, Family & other staff in agreement with plan of care Yes   Clinical Impression Comments Patient would benefit from physical therapy for gentle progression of ROM and stabilization exercises to allow return to increased functional activity safely.  Answered questions about ongoing symptoms, and expected progression of therapy.   ORTHO GOALS   PT Ortho Eval Goals 1;2;3;4;5   Ortho Goal 1   Goal Identifier neck Rotation ROM   Goal Description Patient will demonstrate increased Right rotation from 15 degrees to 35 degrees or more for improved safety with driving.   Target Date 09/13/22   Ortho Goal 2   Goal Identifier neck Rotation " ROM   Goal Description Patient will demonstrate increased Left rotation from 25 degrees to 35 degrees or more for improved safety with driving.   Target Date 09/13/22   Ortho Goal 3   Goal Identifier neck stability   Goal Description Patient will demonstrate 145 degrees or more shoulder flexion with no increased pain in base of neck for increased ability to perform overhead reaching for home and work tasks.   Target Date 09/13/22   Ortho Goal 4   Goal Identifier scapular stability   Goal Description Patient will report decreased pain from 9/10 worst to 0-3/10 with activity for increased tolerance to home and work tasks.   Target Date 09/13/22   Ortho Goal 5   Goal Identifier soft tissue mobility   Goal Description Patient will demonstrate full IR ROM bilaterally with 0-3/10 pain max for increased ability to perform self-cares and dressing tasks.   Target Date 09/13/22   Total Evaluation Time   PT Bárbara Low Complexity Minutes (37900) 30

## 2022-07-15 DIAGNOSIS — Z98.890 S/P CERVICAL DISC REPLACEMENT: Primary | ICD-10-CM

## 2022-07-19 ENCOUNTER — OFFICE VISIT (OUTPATIENT)
Dept: ORTHOPEDICS | Facility: OTHER | Age: 42
End: 2022-07-19
Attending: STUDENT IN AN ORGANIZED HEALTH CARE EDUCATION/TRAINING PROGRAM
Payer: OTHER MISCELLANEOUS

## 2022-07-19 ENCOUNTER — HOSPITAL ENCOUNTER (OUTPATIENT)
Dept: GENERAL RADIOLOGY | Facility: OTHER | Age: 42
Discharge: HOME | End: 2022-07-19
Attending: STUDENT IN AN ORGANIZED HEALTH CARE EDUCATION/TRAINING PROGRAM
Payer: OTHER MISCELLANEOUS

## 2022-07-19 DIAGNOSIS — Z98.890 S/P CERVICAL DISC REPLACEMENT: ICD-10-CM

## 2022-07-19 DIAGNOSIS — Z98.890 S/P CERVICAL DISC REPLACEMENT: Primary | ICD-10-CM

## 2022-07-19 PROCEDURE — 99024 POSTOP FOLLOW-UP VISIT: CPT | Performed by: STUDENT IN AN ORGANIZED HEALTH CARE EDUCATION/TRAINING PROGRAM

## 2022-07-19 PROCEDURE — 72040 X-RAY EXAM NECK SPINE 2-3 VW: CPT

## 2022-07-19 NOTE — PROGRESS NOTES
Visit Date: 2022    HISTORY OF PRESENT ILLNESS:  Florencio is a 41-year-old male who is approximately 6 weeks out from a C6-7 anterior cervical diskectomy and fusion.  He continues to make some slow and steady progress, but unfortunately had a delay in beginning of physical therapy, which only started earlier this week.    PHYSICAL EXAMINATION:    GENERAL:  Well-appearing, well-nourished.    MUSCULOSKELETAL NEUROLOGIC:  He has 5/5 strength in bilateral deltoid, biceps, triceps, wrist extension, flexion, hand  and hand intrinsics.  He has normal sensation to light touch throughout bilateral upper extremities.    IMAGING:  Available for review today includes AP and lateral radiographs of the cervical spine, which shows adequate positioning of his hardware.  No evidence of early hardware failure.    ASSESSMENT AND PLAN:  Florencio is a 41-year-old male, 6 weeks out from a C6-7 anterior cervical total disk replacement.  He will basically begin his physical therapy at this time, 2-3 days a week for the next 4-6 weeks with a slow and gradual increase of his physical activity and at this point, we will keep him out of work during physical therapy.  have him come back to clinic in 6 weeks for routine evaluation.    This clinic visit took 15-29 minutes.    Walt Rasheed MD        D: 2022   T: 2022   MT: OLIMPIA    Name:     JUDY SAHNI  MRN:      [id number]        Account:    [id number]   :      1980           Visit Date: 2022     Document: [id number]

## 2022-07-19 NOTE — NURSING NOTE
"Chief Complaint   Patient presents with     RECHECK     Cervical Spine Pain      Pt is here today for a recheck s/p CDR.     Grazyna Pearce LPN on 7/19/2022 at 11:45 AM     Initial There were no vitals taken for this visit. Estimated body mass index is 41.84 kg/m  as calculated from the following:    Height as of 5/18/22: 1.803 m (5' 11\").    Weight as of 6/20/22: 136.1 kg (300 lb).  Medication Reconciliation: complete    Grazyna Pearce LPN  "

## 2022-07-20 ENCOUNTER — HOSPITAL ENCOUNTER (OUTPATIENT)
Dept: PHYSICAL THERAPY | Facility: OTHER | Age: 42
Setting detail: THERAPIES SERIES
Discharge: HOME | End: 2022-07-20
Attending: STUDENT IN AN ORGANIZED HEALTH CARE EDUCATION/TRAINING PROGRAM
Payer: OTHER MISCELLANEOUS

## 2022-07-20 PROCEDURE — 97140 MANUAL THERAPY 1/> REGIONS: CPT | Mod: GP | Performed by: PHYSICAL THERAPIST

## 2022-07-27 ENCOUNTER — HOSPITAL ENCOUNTER (OUTPATIENT)
Dept: PHYSICAL THERAPY | Facility: OTHER | Age: 42
Setting detail: THERAPIES SERIES
Discharge: HOME | End: 2022-07-27
Attending: STUDENT IN AN ORGANIZED HEALTH CARE EDUCATION/TRAINING PROGRAM
Payer: OTHER MISCELLANEOUS

## 2022-07-27 PROCEDURE — 97110 THERAPEUTIC EXERCISES: CPT | Mod: GP | Performed by: PHYSICAL THERAPIST

## 2022-08-03 ENCOUNTER — HOSPITAL ENCOUNTER (OUTPATIENT)
Dept: PHYSICAL THERAPY | Facility: OTHER | Age: 42
Setting detail: THERAPIES SERIES
Discharge: HOME | End: 2022-08-03
Attending: STUDENT IN AN ORGANIZED HEALTH CARE EDUCATION/TRAINING PROGRAM
Payer: OTHER MISCELLANEOUS

## 2022-08-03 PROCEDURE — 97140 MANUAL THERAPY 1/> REGIONS: CPT | Mod: GP

## 2022-08-03 PROCEDURE — 97110 THERAPEUTIC EXERCISES: CPT | Mod: GP

## 2022-08-05 ENCOUNTER — HOSPITAL ENCOUNTER (OUTPATIENT)
Dept: PHYSICAL THERAPY | Facility: OTHER | Age: 42
Setting detail: THERAPIES SERIES
Discharge: HOME | End: 2022-08-05
Attending: STUDENT IN AN ORGANIZED HEALTH CARE EDUCATION/TRAINING PROGRAM
Payer: OTHER MISCELLANEOUS

## 2022-08-05 PROCEDURE — 97110 THERAPEUTIC EXERCISES: CPT | Mod: GP | Performed by: PHYSICAL THERAPIST

## 2022-08-12 ENCOUNTER — HOSPITAL ENCOUNTER (OUTPATIENT)
Dept: PHYSICAL THERAPY | Facility: OTHER | Age: 42
Setting detail: THERAPIES SERIES
Discharge: HOME | End: 2022-08-12
Attending: STUDENT IN AN ORGANIZED HEALTH CARE EDUCATION/TRAINING PROGRAM
Payer: OTHER MISCELLANEOUS

## 2022-08-12 PROCEDURE — 97110 THERAPEUTIC EXERCISES: CPT | Mod: GP | Performed by: PHYSICAL THERAPIST

## 2022-08-12 NOTE — PROGRESS NOTES
[facility]    Outpatient Physical Therapy Progress Note    Patient: Matt May  : 1980    Beginning/End Dates of Reporting Period:  22 to 22  Current certification period through 22    Referring Provider: ASHER Lee    Therapy Diagnosis: cervical disc disorder at C6/7 level with radiculopathy         Client Self Report: Patient reports ongoing burning in the back of the left arm.  Feels hot to touch.  Relatively constant, almost like it felt prior to surgery.  Tingling is coming back and numbness in fingertips.  Next follow-up with surgeon is 22.    Objective Measurements:  Objective Measure: Rotation Right:   = 15         = 45         = 53    Objective Measure: Rotation Left:   = 25          = 55        8 = 53    Objective Measure: Sidebending Right:   = 11  8 = 16    Objective Measure: Sidebending Left:   = 13   = 20    Objective Measure: Flexion:   = 21   = 46, pulling in L side    Objective Measure: Extension:   = 31   = 46      Goals:  Goal Identifier neck Rotation ROM   Goal Description Patient will demonstrate increased Right rotation from 15 degrees to 35 degrees or more for improved safety with driving.   Target Date 22   Date Met  22   Progress (detail required for progress note):  Goal met     Goal Identifier neck Rotation ROM   Goal Description Patient will demonstrate increased Left rotation from 25 degrees to 35 degrees or more for improved safety with driving.   Target Date 22   Date Met  22   Progress (detail required for progress note):  Goal met     Goal Identifier neck stability   Goal Description Patient will demonstrate 145 degrees or more shoulder flexion with no increased pain in base of neck for increased ability to perform overhead reaching for home and work tasks.   Target Date  09/13/22   Date Met      Progress (detail required for progress note):       Goal Identifier scapular stability   Goal Description Patient will report decreased pain from 9/10 worst to 0-3/10 with activity for increased tolerance to home and work tasks.   Target Date 09/13/22   Date Met      Progress (detail required for progress note):       Goal Identifier soft tissue mobility   Goal Description Patient will demonstrate full IR ROM bilaterally with 0-3/10 pain max for increased ability to perform self-cares and dressing tasks.   Target Date 09/13/22   Date Met      Progress (detail required for progress note):           Plan:  Patient has been seen 6/12 approved visits.  Treatment has consisted of manual therapy for decreased muscle tension, supine and seated AROM for cervical spine with no overpressure, supine and seated exercise to facilitate deep neck flexors, and scapular stabilization exercises.    Discharge:  No - recommended patient follow up with surgeon due to increased intensity and increased continuity of LUE symptoms of numbness/tingling/buring.

## 2022-08-19 ENCOUNTER — HOSPITAL ENCOUNTER (OUTPATIENT)
Dept: PHYSICAL THERAPY | Facility: OTHER | Age: 42
Setting detail: THERAPIES SERIES
Discharge: HOME | End: 2022-08-19
Attending: STUDENT IN AN ORGANIZED HEALTH CARE EDUCATION/TRAINING PROGRAM
Payer: OTHER MISCELLANEOUS

## 2022-08-19 PROCEDURE — 97140 MANUAL THERAPY 1/> REGIONS: CPT | Mod: GP | Performed by: PHYSICAL THERAPIST

## 2022-08-24 ENCOUNTER — HOSPITAL ENCOUNTER (OUTPATIENT)
Dept: PHYSICAL THERAPY | Facility: OTHER | Age: 42
Setting detail: THERAPIES SERIES
Discharge: HOME | End: 2022-08-24
Attending: STUDENT IN AN ORGANIZED HEALTH CARE EDUCATION/TRAINING PROGRAM
Payer: OTHER MISCELLANEOUS

## 2022-08-24 PROCEDURE — 999N000104 HC STATISTIC NO CHARGE: Performed by: PHYSICAL THERAPIST

## 2022-08-31 DIAGNOSIS — Z98.1 S/P CERVICAL SPINAL FUSION: Primary | ICD-10-CM

## 2022-09-01 ENCOUNTER — HOSPITAL ENCOUNTER (OUTPATIENT)
Dept: GENERAL RADIOLOGY | Facility: OTHER | Age: 42
Discharge: HOME | End: 2022-09-01
Attending: STUDENT IN AN ORGANIZED HEALTH CARE EDUCATION/TRAINING PROGRAM | Admitting: STUDENT IN AN ORGANIZED HEALTH CARE EDUCATION/TRAINING PROGRAM
Payer: COMMERCIAL

## 2022-09-01 ENCOUNTER — OFFICE VISIT (OUTPATIENT)
Dept: ORTHOPEDICS | Facility: OTHER | Age: 42
End: 2022-09-01
Attending: STUDENT IN AN ORGANIZED HEALTH CARE EDUCATION/TRAINING PROGRAM
Payer: OTHER MISCELLANEOUS

## 2022-09-01 VITALS — HEIGHT: 72 IN | BODY MASS INDEX: 40.63 KG/M2 | WEIGHT: 300 LBS

## 2022-09-01 DIAGNOSIS — Z98.890 S/P CERVICAL DISC REPLACEMENT: Primary | ICD-10-CM

## 2022-09-01 DIAGNOSIS — Z98.1 S/P CERVICAL SPINAL FUSION: ICD-10-CM

## 2022-09-01 PROCEDURE — 99213 OFFICE O/P EST LOW 20 MIN: CPT | Performed by: STUDENT IN AN ORGANIZED HEALTH CARE EDUCATION/TRAINING PROGRAM

## 2022-09-01 PROCEDURE — 72040 X-RAY EXAM NECK SPINE 2-3 VW: CPT

## 2022-09-01 RX ORDER — METHYLPREDNISOLONE 4 MG
TABLET, DOSE PACK ORAL
Qty: 21 TABLET | Refills: 0 | Status: SHIPPED | OUTPATIENT
Start: 2022-09-01

## 2022-09-01 ASSESSMENT — PAIN SCALES - GENERAL: PAINLEVEL: SEVERE PAIN (6)

## 2022-09-01 NOTE — PROGRESS NOTES
Visit Date: 2022    HISTORY OF PRESENT ILLNESS:  Florencio is a 42-year-old male who is approximately 3 months out from a C6 to C7 anterior cervical total disk replacement for acute cervical disk herniation.  He had done extremely well in the first of 6-7 weeks post-surgery and continues to do well from a neck pain standpoint, but has developed some burning dysesthesia into his left upper extremity in a multi-nerve root distribution, which is limiting his rehabilitation.    PHYSICAL EXAMINATION:    GENERAL:  Well-developed, well-nourished.   MUSCULOSKELETAL/NEUROLOGIC:  He has 5/5 strength in bilateral deltoid, biceps, triceps, wrist extension, flexion, hand  and hand intrinsics.  He has normal sensation to light touch throughout bilateral upper extremities.    Imaging available for review today includes AP and lateral radiographs of the cervical spine, which shows adequate positioning of his hardware.  No evidence of early hardware failure, migration, or heterotopic ossification.    ASSESSMENT AND PLAN:  Florencio is a 42-year-old male with some return of his left upper extremity pain with improvement in neck pain 3 months out from a C6 to C7 total disk replacement.  At this time, we will give him a Medrol Dosepak to help with the dysesthetic burning pain in his left arm and obtain an updated MRI of the cervical spine to look for any additional pathology.  We will give him a call after the MRI is completed with the next plan of care, should his pain persist.  At this time, I am okay with him continuing physical therapy to continue to rebuild musculature in his left upper extremity.    This clinic visit took approximately 15 minutes.    Walt Rasheed MD        D: 2022   T: 2022   MT: MIRIAM    Name:     JUDY SAHNI  MRN:      [id number]        Account:    [id number]   :      1980           Visit Date: 2022     Document: [id number]

## 2022-09-07 ENCOUNTER — HOSPITAL ENCOUNTER (OUTPATIENT)
Dept: PHYSICAL THERAPY | Facility: OTHER | Age: 42
Setting detail: THERAPIES SERIES
Discharge: HOME | End: 2022-09-07
Attending: STUDENT IN AN ORGANIZED HEALTH CARE EDUCATION/TRAINING PROGRAM
Payer: OTHER MISCELLANEOUS

## 2022-09-07 PROCEDURE — 97110 THERAPEUTIC EXERCISES: CPT | Mod: GP | Performed by: PHYSICAL THERAPIST

## 2022-09-12 DIAGNOSIS — Z98.890 S/P CERVICAL DISC REPLACEMENT: Primary | ICD-10-CM

## 2022-09-14 ENCOUNTER — TELEPHONE (OUTPATIENT)
Dept: ORTHOPEDICS | Facility: OTHER | Age: 42
End: 2022-09-14

## 2022-09-14 ENCOUNTER — HOSPITAL ENCOUNTER (OUTPATIENT)
Dept: PHYSICAL THERAPY | Facility: OTHER | Age: 42
Setting detail: THERAPIES SERIES
Discharge: HOME | End: 2022-09-14
Attending: STUDENT IN AN ORGANIZED HEALTH CARE EDUCATION/TRAINING PROGRAM
Payer: OTHER MISCELLANEOUS

## 2022-09-14 PROCEDURE — 97110 THERAPEUTIC EXERCISES: CPT | Mod: GP,PO | Performed by: PHYSICAL THERAPIST

## 2022-09-14 PROCEDURE — 97140 MANUAL THERAPY 1/> REGIONS: CPT | Mod: GP,PO | Performed by: PHYSICAL THERAPIST

## 2022-09-21 ENCOUNTER — HOSPITAL ENCOUNTER (OUTPATIENT)
Dept: PHYSICAL THERAPY | Facility: OTHER | Age: 42
Setting detail: THERAPIES SERIES
Discharge: HOME | End: 2022-09-21
Attending: STUDENT IN AN ORGANIZED HEALTH CARE EDUCATION/TRAINING PROGRAM
Payer: OTHER MISCELLANEOUS

## 2022-09-21 PROCEDURE — 97140 MANUAL THERAPY 1/> REGIONS: CPT | Mod: GP,PO | Performed by: PHYSICAL THERAPIST

## 2022-09-21 PROCEDURE — 97110 THERAPEUTIC EXERCISES: CPT | Mod: GP,PO | Performed by: PHYSICAL THERAPIST

## 2022-09-21 NOTE — PROGRESS NOTES
"[facility]    Outpatient Physical Therapy Progress Note    Patient: Matt May  : 1980    Beginning/End Dates of Reporting Period:  22 to 22    Referring Provider: ASHER Lee    Therapy Diagnosis: cervical disc disorder at C6/7 level with radiculopathy           Client Self Report: Patient reports neck pain that gets worse toward end range of motion (see measurements below).  \"Feels like a rubber band that will break if I go any further\".  Reports that neck has been feeling more stiff than usual the past couple of days.      Continues to have pain and numbness/tingling/burning in LUE to hand and fingertips.  This has been extremely frustrating due to constant ongoing nature of symptoms.  Patient has followed up with surgeon who has recommended MRI and continued physical therapy to progress scapular exercises as tolerated.  Patient has been waiting for MRI approval from workman's compensation and for MRI scheduling.      Reports compliance with HEP.  Has not been able to add lower extremity portion to upper extremity [place] exercise due to increased pain and pulling experienced across left side of chest.  Continues to have increased pain in neck with BUE resisted extension past neutral in standing.           Objective Measurements:  Objective Measure: Rotation Right:   = 15         = 45         = 53        = 50    Objective Measure: Rotation Left:   = 25          = 55         = 53          = 45    Objective Measure: Sidebending Right:   = 16          = 29    Objective Measure: Sidebending Left:   = 20          = 22    Objective Measure: Flexion:   = 46        = 40    Objective Measure: Extension:   = 46          = 38        Goals:  Goal Identifier neck Rotation ROM   Goal Description Patient will demonstrate increased Right " rotation from 15 degrees to 35 degrees or more for improved safety with driving.   Target Date 09/13/22   Date Met  09/21/22   Progress (detail required for progress note):  Goal met     Goal Identifier neck Rotation ROM   Goal Description Patient will demonstrate increased Left rotation from 25 degrees to 35 degrees or more for improved safety with driving.   Target Date 09/13/22   Date Met  09/21/22   Progress (detail required for progress note):  Goal met     Goal Identifier neck stability   Goal Description Patient will demonstrate 145 degrees or more shoulder flexion with no increased pain in base of neck for increased ability to perform overhead reaching for home and work tasks.   Target Date 09/13/22   Date Met      Progress (detail required for progress note):  Not met - increased pain in neck with UE flexion     Goal Identifier scapular stability   Goal Description Patient will report decreased pain from 9/10 worst to 0-3/10 with activity for increased tolerance to home and work tasks.   Target Date 09/13/22   Date Met      Progress (detail required for progress note):  Not met - continued pain with ADLs     Goal Identifier soft tissue mobility   Goal Description Patient will demonstrate full IR ROM bilaterally with 0-3/10 pain max for increased ability to perform self-cares and dressing tasks.   Target Date 09/13/22   Date Met      Progress (detail required for progress note):  Progress made - Improved UE motion, but continued pain greater than 3/10.           Plan:  Continue therapy per current plan of care.  Patient has been seen 10/12 approved Work Comp visits.  Recommend seeing patient 8 additional visits for continued progression of scapular and cervical stabilization exercises as tolerated.  Plan to reduce visits to 1x/week and transition to HEP.    Discharge:  No

## 2022-09-28 ENCOUNTER — HOSPITAL ENCOUNTER (OUTPATIENT)
Dept: MRI IMAGING | Facility: OTHER | Age: 42
Discharge: HOME | End: 2022-09-28
Attending: STUDENT IN AN ORGANIZED HEALTH CARE EDUCATION/TRAINING PROGRAM | Admitting: STUDENT IN AN ORGANIZED HEALTH CARE EDUCATION/TRAINING PROGRAM
Payer: OTHER MISCELLANEOUS

## 2022-09-28 DIAGNOSIS — Z98.890 S/P CERVICAL DISC REPLACEMENT: ICD-10-CM

## 2022-09-28 PROCEDURE — 72141 MRI NECK SPINE W/O DYE: CPT

## 2022-09-30 ENCOUNTER — HOSPITAL ENCOUNTER (OUTPATIENT)
Dept: PHYSICAL THERAPY | Facility: OTHER | Age: 42
Setting detail: THERAPIES SERIES
Discharge: HOME | End: 2022-09-30
Attending: STUDENT IN AN ORGANIZED HEALTH CARE EDUCATION/TRAINING PROGRAM
Payer: OTHER MISCELLANEOUS

## 2022-09-30 PROCEDURE — 999N000104 HC STATISTIC NO CHARGE: Mod: PO | Performed by: PHYSICAL THERAPIST

## 2022-10-06 ENCOUNTER — TELEPHONE (OUTPATIENT)
Dept: FAMILY MEDICINE | Facility: OTHER | Age: 42
End: 2022-10-06

## 2022-10-06 NOTE — TELEPHONE ENCOUNTER
Patient still waiting for MRI results from 9/28/22.Please call when PBI gets back.    Temitope Avila on 10/6/2022 at 1:19 PM

## 2022-10-10 NOTE — TELEPHONE ENCOUNTER
Dr Rasheed ordered the scan, so his office should be communicating results.  From what the radiology report states area of surgery looks good. Above that is a disc bulge and tear.  Most of the time these heal.

## 2022-10-11 NOTE — TELEPHONE ENCOUNTER
There was a message sent to Dr. Rasheed letting him know. Dr. Rasheed said that he does want to see him to review MRI results in clinic. [place] office was going to call him to get that scheduled. I will also let scheduling here to know to call him.   Eden Santillan LPN .....................10/11/2022 10:16 AM

## 2022-10-17 ENCOUNTER — OFFICE VISIT (OUTPATIENT)
Dept: ORTHOPEDICS | Facility: OTHER | Age: 42
End: 2022-10-17
Attending: STUDENT IN AN ORGANIZED HEALTH CARE EDUCATION/TRAINING PROGRAM
Payer: OTHER MISCELLANEOUS

## 2022-10-17 DIAGNOSIS — M50.20 HERNIATED CERVICAL INTERVERTEBRAL DISC: Primary | ICD-10-CM

## 2022-10-17 PROCEDURE — 99213 OFFICE O/P EST LOW 20 MIN: CPT | Performed by: STUDENT IN AN ORGANIZED HEALTH CARE EDUCATION/TRAINING PROGRAM

## 2022-10-17 NOTE — PROGRESS NOTES
Visit Date: 10/17/2022    HISTORY OF PRESENT ILLNESS:  Florencio is a 42-year-old male who is approximately 4 months out from a C6 to 7 anterior cervical total disk replacement.  He initially did very well from that surgery, but over the last month or so has developed progressive left sided arm pain in a C5 to C6 nerve root distribution down towards his deltoid and into his biceps with the occasional radiation to the lateral aspect of his left hand.    PHYSICAL EXAMINATION:    GENERAL:  Well-developed, well-nourished.   MUSCULOSKELETAL NEUROLOGIC:  He has 5/5 strength in bilateral deltoid, biceps, triceps, wrist extension, flexion, hand  and hand intrinsics.  He has normal sensation to light touch throughout bilateral upper extremities.    IMAGING:  Available for review today includes an updated MRI of the cervical spine, which shows a well decompressed spinal canal and neural foramen at C6 to C7 with no recurrent stenosis at that level, but does show a disk herniation on the level of C5 to C6 above with a mild to moderate amount of left greater than right sided neural foraminal stenosis.    ASSESSMENT AND PLAN:  Florencio is a 42-year-old male with new disk herniation at C5 to C6 above his prior disk replacement surgery.  For this reason, he is being referred to Dr. Desai for a left sided C5 to C6 targeted epidural steroid injection.  Follow up with me in 6 weeks for routine evaluation.    This clinic visit took 15 to 29 minutes.    Walt Rasheed MD        D: 10/17/2022   T: 10/17/2022   MT: ROMINA    Name:     JUDY SAHNI  MRN:      [id number]        Account:    [id number]   :      1980           Visit Date: 10/17/2022     Document: [id number]

## 2022-10-17 NOTE — PROGRESS NOTES
Patient is here for follow up on his MRI results.  Eden Santillan LPN .....................10/17/2022 9:03 AM

## 2022-10-20 ENCOUNTER — TELEPHONE (OUTPATIENT)
Dept: ORTHOPEDICS | Facility: OTHER | Age: 42
End: 2022-10-20

## 2022-10-20 NOTE — TELEPHONE ENCOUNTER
Reason for call: Medication or medication refill    Name of medication requested: oxyCODONE and methocarbamol    Are you out of the medication? 2 days lef    What pharmacy do you use? [facility]    Preferred method for responding to this message: Telephone Call    Phone number patient can be reached at: Home number on file [phone] (home)    If we cannot reach you directly, may we leave a detailed response at the number you provided? Yes

## 2022-10-20 NOTE — TELEPHONE ENCOUNTER
Per chart review, medications are historically reported. Pt saw Dr. Rasheed on 10/17. Called and spoke with Rema at [facility] and she states this was last prescribed in August and verbalized plan to get request to Dr. Rasheed to address when he returns to the office on 10/26. He is not scheduled to return to [facility] until 11/3. Called and spoke to Patient after verifying last name and date of birth. Pt informed of this plan and he noted he last picked up #30 of pain medication and #60 of muscle relaxer on 9/4. Pt verbalized understanding and will check with [facility] on 10/26. Morena Davila RN .............. 10/20/2022  4:04 PM

## 2022-11-08 ENCOUNTER — HOSPITAL ENCOUNTER (OUTPATIENT)
Dept: GENERAL RADIOLOGY | Facility: OTHER | Age: 42
Discharge: HOME | End: 2022-11-08
Attending: STUDENT IN AN ORGANIZED HEALTH CARE EDUCATION/TRAINING PROGRAM | Admitting: STUDENT IN AN ORGANIZED HEALTH CARE EDUCATION/TRAINING PROGRAM
Payer: OTHER MISCELLANEOUS

## 2022-11-08 DIAGNOSIS — M50.20 HERNIATED CERVICAL INTERVERTEBRAL DISC: ICD-10-CM

## 2022-11-08 PROCEDURE — 255N000002 HC RX 255 OP 636: Performed by: RADIOLOGY

## 2022-11-08 PROCEDURE — 250N000011 HC RX IP 250 OP 636: Performed by: RADIOLOGY

## 2022-11-08 PROCEDURE — 64479 NJX AA&/STRD TFRM EPI C/T 1: CPT | Mod: LT

## 2022-11-08 PROCEDURE — 250N000009 HC RX 250: Performed by: RADIOLOGY

## 2022-11-08 RX ORDER — LIDOCAINE HYDROCHLORIDE 10 MG/ML
1 INJECTION, SOLUTION INFILTRATION; PERINEURAL ONCE
Status: COMPLETED | OUTPATIENT
Start: 2022-11-08 | End: 2022-11-08

## 2022-11-08 RX ORDER — DEXAMETHASONE SODIUM PHOSPHATE 10 MG/ML
10 INJECTION, SOLUTION INTRAMUSCULAR; INTRAVENOUS ONCE
Status: COMPLETED | OUTPATIENT
Start: 2022-11-08 | End: 2022-11-08

## 2022-11-08 RX ORDER — LIDOCAINE HYDROCHLORIDE 10 MG/ML
1.5 INJECTION, SOLUTION EPIDURAL; INFILTRATION; INTRACAUDAL; PERINEURAL ONCE
Status: COMPLETED | OUTPATIENT
Start: 2022-11-08 | End: 2022-11-08

## 2022-11-08 RX ADMIN — LIDOCAINE HYDROCHLORIDE 1.5 ML: 10 INJECTION, SOLUTION EPIDURAL; INFILTRATION; INTRACAUDAL; PERINEURAL at 15:33

## 2022-11-08 RX ADMIN — DEXAMETHASONE SODIUM PHOSPHATE 10 MG: 10 INJECTION, SOLUTION INTRAMUSCULAR; INTRAVENOUS at 15:33

## 2022-11-08 RX ADMIN — LIDOCAINE HYDROCHLORIDE 1 ML: 10 INJECTION, SOLUTION INFILTRATION; PERINEURAL at 15:34

## 2022-11-08 RX ADMIN — IOHEXOL 1 ML: 240 INJECTION, SOLUTION INTRATHECAL; INTRAVASCULAR; INTRAVENOUS; ORAL at 15:34

## 2022-11-29 ENCOUNTER — TELEPHONE (OUTPATIENT)
Dept: FAMILY MEDICINE | Facility: OTHER | Age: 42
End: 2022-11-29

## 2022-11-29 DIAGNOSIS — R68.89 FLU-LIKE SYMPTOMS: Primary | ICD-10-CM

## 2022-11-29 RX ORDER — OSELTAMIVIR PHOSPHATE 75 MG/1
75 CAPSULE ORAL 2 TIMES DAILY
Qty: 10 CAPSULE | Refills: 0 | Status: SHIPPED | OUTPATIENT
Start: 2022-11-29 | End: 2022-12-04

## 2022-11-29 NOTE — TELEPHONE ENCOUNTER
"Daughter tested positive for Influenza A last Friday, spouse on Saturday.  Symptoms for patient started on Sunday: fever, sore throat, shortness of breath and trying to cough but it feels like \"glass\" in his lungs.  OTC cough syrup has not helped and patient would like some Tamiflu if possible.      Vee Lamb LPN 11/29/2022 3:29 PM      "

## 2022-11-29 NOTE — TELEPHONE ENCOUNTER
The patient's wife called and states that the household has influenza A.  Wondering if the patient can get an order called in for this.

## 2022-11-30 NOTE — PROGRESS NOTES
[facility]    Outpatient Physical Therapy Discharge Note    Patient: Matt May  : 1980    Beginning/End Dates of Reporting Period:  22 to 22    Referring Provider: ASHER Lee    Therapy Diagnosis: cervical disc disorder at C6/7 level with radiculopathy           Client Self Report: -NA-  Patient was last seen 22.  Was seen by Dr. Rasheed 10/17/22 and an injection 22.  No further therapy visits are scheduled at this time.      Objective Measurements:  NA - Progress Note was completed on 22    Goals:  NA - Progress Note was completed on 22      Plan:  Discharge from therapy.    Reason for Discharge: Patient was seen 10/12 approved work comp visits.  Patient was not making progress with therapy due to pain.  Patient has not been seen in greater than 30 days and no further visits are scheduled at this time.    Equipment Issued: none    Discharge Plan: Patient to continue home program.

## 2023-01-17 ENCOUNTER — OFFICE VISIT (OUTPATIENT)
Dept: ORTHOPEDICS | Facility: OTHER | Age: 43
End: 2023-01-17
Attending: STUDENT IN AN ORGANIZED HEALTH CARE EDUCATION/TRAINING PROGRAM
Payer: COMMERCIAL

## 2023-01-17 VITALS — WEIGHT: 315 LBS | RESPIRATION RATE: 18 BRPM | HEIGHT: 72 IN | BODY MASS INDEX: 42.66 KG/M2

## 2023-01-17 DIAGNOSIS — M50.20 HERNIATED CERVICAL INTERVERTEBRAL DISC: Primary | ICD-10-CM

## 2023-01-17 PROCEDURE — 99214 OFFICE O/P EST MOD 30 MIN: CPT | Performed by: STUDENT IN AN ORGANIZED HEALTH CARE EDUCATION/TRAINING PROGRAM

## 2023-01-17 PROCEDURE — G0463 HOSPITAL OUTPT CLINIC VISIT: HCPCS

## 2023-01-17 ASSESSMENT — PAIN SCALES - GENERAL: PAINLEVEL: SEVERE PAIN (6)

## 2023-01-17 NOTE — PROGRESS NOTES
Visit Date: 2023    HISTORY OF PRESENT ILLNESS:  Florencio is a 42-year-old male who underwent a C6 to C7 anterior cervical total disk replacement with me back in 2022.  He initially did extremely well after the surgery with near complete resolution of his neck and arm pain, but unfortunately, he subsequently developed an adjacent segment disk herniation at C5 to C6.  We have been trying for the last several months conservative management of this including intermittent injections, medication management and activity modification with only worsened progression of his neck and radiating C5 to C6 nerve root distribution pain.    PHYSICAL EXAMINATION:    GENERAL:  Well-appearing, well-nourished.  MUSCULOSKELETAL/NEUROLOGIC:  He has some pain limited weakness in the bilateral deltoids 4+/5, otherwise 5/5 strength in bilateral biceps, triceps, wrist extension, flexion, hand  and hand intrinsics.  He has normal sensation to light touch throughout bilateral upper extremities.  MRI of his cervical spine that was previously taken shows a sizeable C5 to C6 disk herniation with significant spinal canal and left greater than right sided neural foraminal compression.    ASSESSMENT AND PLAN:  Florencio is a 42-year-old male well-healed from a C6 to C7 anterior cervical total disk replacement with a subsequent failure of conservative management of a C5 to C6 disk herniation.  Risks, benefits and alternatives were discussed with the patient.  He has elected to undergo a C5 to C6 anterior cervical total disk replacement, which will be performed at [facility] with the use of intraoperative neuromonitoring once we can get scheduled.    This clinic visit took 30-44 minutes.    Watl Rasheed MD        D: 2023   T: 2023   MT: AREN    Name:     JUDY SAHNI  MRN:      [id number]        Account:    [id number]   :      1980           Visit Date: 2023     Document: [id number]

## 2023-01-17 NOTE — PROGRESS NOTES
Patient is here today for follow up C6-7 disc replacement s/p 7 months.     Mere Bland LPN .......  1/17/2023  9:38 AM

## 2023-02-16 ENCOUNTER — OFFICE VISIT (OUTPATIENT)
Dept: INTERNAL MEDICINE | Facility: OTHER | Age: 43
End: 2023-02-16
Payer: OTHER MISCELLANEOUS

## 2023-02-16 VITALS
OXYGEN SATURATION: 97 % | SYSTOLIC BLOOD PRESSURE: 136 MMHG | HEART RATE: 93 BPM | BODY MASS INDEX: 42.66 KG/M2 | TEMPERATURE: 98.6 F | WEIGHT: 315 LBS | DIASTOLIC BLOOD PRESSURE: 70 MMHG | HEIGHT: 72 IN | RESPIRATION RATE: 20 BRPM

## 2023-02-16 DIAGNOSIS — Z01.818 PREOPERATIVE EXAMINATION: Primary | ICD-10-CM

## 2023-02-16 DIAGNOSIS — Z01.818 PREOP GENERAL PHYSICAL EXAM: ICD-10-CM

## 2023-02-16 DIAGNOSIS — E66.01 MORBID OBESITY (H): ICD-10-CM

## 2023-02-16 DIAGNOSIS — F12.10 CANNABIS ABUSE: ICD-10-CM

## 2023-02-16 LAB
ALBUMIN SERPL BCG-MCNC: 4.5 G/DL (ref 3.5–5.2)
ALP SERPL-CCNC: 73 U/L (ref 40–129)
ALT SERPL W P-5'-P-CCNC: 59 U/L (ref 10–50)
ANION GAP SERPL CALCULATED.3IONS-SCNC: 5 MMOL/L (ref 7–15)
AST SERPL W P-5'-P-CCNC: 47 U/L (ref 10–50)
BASOPHILS # BLD AUTO: 0 10E3/UL (ref 0–0.2)
BASOPHILS NFR BLD AUTO: 1 %
BILIRUB SERPL-MCNC: 0.4 MG/DL
BUN SERPL-MCNC: 11.2 MG/DL (ref 6–20)
CALCIUM SERPL-MCNC: 9.5 MG/DL (ref 8.6–10)
CHLORIDE SERPL-SCNC: 102 MMOL/L (ref 98–107)
CREAT SERPL-MCNC: 1.03 MG/DL (ref 0.67–1.17)
DEPRECATED HCO3 PLAS-SCNC: 30 MMOL/L (ref 22–29)
EOSINOPHIL # BLD AUTO: 0.1 10E3/UL (ref 0–0.7)
EOSINOPHIL NFR BLD AUTO: 2 %
ERYTHROCYTE [DISTWIDTH] IN BLOOD BY AUTOMATED COUNT: 12.9 % (ref 10–15)
GFR SERPL CREATININE-BSD FRML MDRD: >90 ML/MIN/1.73M2
GLUCOSE SERPL-MCNC: 105 MG/DL (ref 70–99)
HCT VFR BLD AUTO: 46.9 % (ref 40–53)
HGB BLD-MCNC: 15.6 G/DL (ref 13.3–17.7)
HOLD SPECIMEN: NORMAL
IMM GRANULOCYTES # BLD: 0 10E3/UL
IMM GRANULOCYTES NFR BLD: 0 %
LYMPHOCYTES # BLD AUTO: 2.2 10E3/UL (ref 0.8–5.3)
LYMPHOCYTES NFR BLD AUTO: 36 %
MCH RBC QN AUTO: 28.6 PG (ref 26.5–33)
MCHC RBC AUTO-ENTMCNC: 33.3 G/DL (ref 31.5–36.5)
MCV RBC AUTO: 86 FL (ref 78–100)
MONOCYTES # BLD AUTO: 0.6 10E3/UL (ref 0–1.3)
MONOCYTES NFR BLD AUTO: 9 %
NEUTROPHILS # BLD AUTO: 3.2 10E3/UL (ref 1.6–8.3)
NEUTROPHILS NFR BLD AUTO: 52 %
NRBC # BLD AUTO: 0 10E3/UL
NRBC BLD AUTO-RTO: 0 /100
PLATELET # BLD AUTO: 195 10E3/UL (ref 150–450)
POTASSIUM SERPL-SCNC: 4.6 MMOL/L (ref 3.4–5.3)
PROT SERPL-MCNC: 7.8 G/DL (ref 6.4–8.3)
RBC # BLD AUTO: 5.45 10E6/UL (ref 4.4–5.9)
SODIUM SERPL-SCNC: 137 MMOL/L (ref 136–145)
WBC # BLD AUTO: 6.1 10E3/UL (ref 4–11)

## 2023-02-16 PROCEDURE — 99214 OFFICE O/P EST MOD 30 MIN: CPT

## 2023-02-16 PROCEDURE — 85025 COMPLETE CBC W/AUTO DIFF WBC: CPT | Mod: ZL

## 2023-02-16 PROCEDURE — 93000 ELECTROCARDIOGRAM COMPLETE: CPT | Performed by: INTERNAL MEDICINE

## 2023-02-16 PROCEDURE — 80053 COMPREHEN METABOLIC PANEL: CPT | Mod: ZL

## 2023-02-16 PROCEDURE — 36415 COLL VENOUS BLD VENIPUNCTURE: CPT | Mod: ZL

## 2023-02-16 ASSESSMENT — PAIN SCALES - GENERAL: PAINLEVEL: SEVERE PAIN (6)

## 2023-02-16 NOTE — PROGRESS NOTES
[facility]  [address]  [place] [address]  Phone: [phone]  Primary Provider: Marques Veras  Pre-op Performing Provider: SHAUN GARCIA      PREOPERATIVE EVALUATION:  Today's date: 2/16/2023    Matt May is a 42 year old male who presents for a preoperative evaluation.    Surgical Information:  Surgery/Procedure: C5 and C6 anterior cervical total disk replacement   Surgery Location: [facility]  Surgeon: Dr Rasheed  Surgery Date: 2-  Time of Surgery: To be determined  Where patient plans to recover: At home with family      Type of Anesthesia Anticipated: Choice    Assessment & Plan   Matt May is a 42 year old presenting for the following health issues:      ICD-10-CM    1. Preoperative examination  Z01.818       2. Morbid obesity (H)  E66.01       3. Cannabis abuse  F12.10       4. Preop general physical exam  Z01.818 Comprehensive Metabolic Panel     CBC with Platelets & Differential (GI Only)     EKG 12-lead (E and GI Clinics Only)     Comprehensive Metabolic Panel     CBC with Platelets & Differential ([facility] Only)        Patient is as optimized as possible for his upcoming C5-C6 anterior cervical total disk replacement.  CBC, CMP, EKG are all stable today. Instructed him to hold all over-the-counter blood thinners such as ibuprofen, aspirin, consider limiting muscle relaxer as much as possible as this can also increase his risk of bleeding.  Instructed him that if he develops a cough, cold, new shortness of breath or chest pain to call surgery center as soon as possible.      Return if symptoms worsen or fail to improve.    RAMAN Rae CNP  [facility]      Subjective     HPI related to upcoming procedure:     Patient is scheduled to have a C5-C6 anterior cervical total disk replacement at [facility].      He previously had a C6-C7 anterior cervical total disk replacement with subsequent failure of  conservative management of a C5-C6 disc herniation therefore he will be proceeding with a C5-C6 disk replacement.    Preop Questions 2/16/2023   1. Have you ever had a heart attack or stroke? No   2. Have you ever had surgery on your heart or blood vessels, such as a stent placement, a coronary artery bypass, or surgery on an artery in your head, neck, heart, or legs? No   3. Do you have chest pain with activity? No-musculoskeletal pain related to neck pain throughout shoulders and chest area-chronic   4. Do you have a history of  heart failure? No   5. Do you currently have a cold, bronchitis or symptoms of other infection? No   6. Do you have a cough, shortness of breath, or wheezing? No   7. Do you or anyone in your family have previous history of blood clots? No   8. Do you or does anyone in your family have a serious bleeding problem such as prolonged bleeding following surgeries or cuts? No   9. Have you ever had problems with anemia or been told to take iron pills? No   10. Have you had any abnormal blood loss such as black, tarry or bloody stools? No   11. Have you ever had a blood transfusion? No   12. Are you willing to have a blood transfusion if it is medically needed before, during, or after your surgery? Yes   13. Have you or any of your relatives ever had problems with anesthesia? No   14. Do you have sleep apnea, excessive snoring or daytime drowsiness? No   15. Do you have any artifical heart valves or other implanted medical devices like a pacemaker, defibrillator, or continuous glucose monitor? No   16. Do you have artificial joints? No   17. Are you allergic to latex? No       Health Care Directive:  Patient does not have a Health Care Directive or Living Will: Discussed advance care planning with patient; however, patient declined at this time.    Preoperative Review of :   reviewed - controlled substances reflected in medication list.      Status of Chronic Conditions:  None known- quit  smoking cigars 2 years ago     Review of Systems   Constitutional: Negative for fever.   Respiratory: Negative for cough, chest tightness, shortness of breath and wheezing.    Cardiovascular: Negative for chest pain.   Gastrointestinal: Negative for heartburn, nausea and vomiting.   Musculoskeletal: Positive for neck pain and neck stiffness.   All other systems reviewed and are negative.    Constitutional, neuro, ENT, endocrine, pulmonary, cardiac, gastrointestinal, genitourinary, musculoskeletal, integument and psychiatric systems are negative, except as otherwise noted.    Patient Active Problem List    Diagnosis Date Noted     Morbid obesity (H) 12/28/2021     Priority: Medium     Tear of talofibular ligament of left lower extremity, sequela 04/25/2018     Priority: Medium     Adhesive capsulitis of left shoulder 11/29/2016     Priority: Medium     Tendinitis of left rotator cuff 11/28/2016     Priority: Medium     Backache 01/11/2012     Priority: Medium     Pain in joint, lower leg 01/11/2012     Priority: Medium     Obesity 01/11/2012     Priority: Medium     Tobacco use disorder 01/11/2012     Priority: Medium     Cannabis abuse 07/14/2010     Priority: Medium     Esophageal reflux 06/16/2010     Priority: Medium     Nonspecific reaction to tuberculin skin test without active tuberculosis 07/23/2009     Priority: Medium     Overview:   Positive Mantoux.  Negative chest x-ray.  Started on isoniazid.        Past Medical History:   Diagnosis Date     Adhesive capsulitis of left shoulder     11/29/2016     Gastro-esophageal reflux disease without esophagitis     No Comments Provided     Nonspecific reaction to tuberculin skin test without active tuberculosis     2009,negative chest x-ray. Completed isoniazid.     Other shoulder lesions, left shoulder     11/28/2016     Past Surgical History:   Procedure Laterality Date     ANKLE SURGERY Left 05/02/2018     APPENDECTOMY OPEN      2005     Current Outpatient  Medications   Medication Sig Dispense Refill     ibuprofen (ADVIL/MOTRIN) 200 MG tablet Take 800 mg by mouth every 6 hours as needed       methocarbamol (ROBAXIN) 750 MG tablet TAKE 1 TABLET BY MOUTH EVERY 6 HOURS FOR MUSCLE PAIN       methylPREDNISolone (MEDROL DOSEPAK) 4 MG tablet therapy pack Follow Package Directions (Patient not taking: Reported on 2023) 21 tablet 0       Allergies   Allergen Reactions     Penicillins Hives     Other reaction(s): Edema     Codeine Itching and Rash        Social History     Tobacco Use     Smoking status: Former     Packs/day: 5.00     Types: Cigarettes     Quit date: 10/23/2021     Years since quittin.3     Smokeless tobacco: Current     Types: Chew   Substance Use Topics     Alcohol use: Not Currently     Alcohol/week: 0.0 standard drinks     Comment: rare     Family History   Problem Relation Age of Onset     Diabetes Father      Other - See Comments Father          of a staph infection     History   Drug Use     Types: Marijuana         Objective     /70 (BP Location: Right arm, Patient Position: Sitting, Cuff Size: Adult Large)   Pulse 93   Temp 98.6  F (37  C)   Resp 20   Ht 1.829 m (6')   Wt (!) 156 kg (344 lb)   SpO2 97%   BMI 46.65 kg/m      Physical Exam  Vitals reviewed.   Constitutional:       Appearance: Normal appearance.   HENT:      Head: Normocephalic and atraumatic.   Eyes:      General:         Right eye: No discharge.         Left eye: No discharge.      Pupils: Pupils are equal, round, and reactive to light.   Cardiovascular:      Rate and Rhythm: Normal rate and regular rhythm.   Pulmonary:      Effort: Pulmonary effort is normal.      Breath sounds: Normal breath sounds. No rhonchi.   Musculoskeletal:      Cervical back: Tenderness present. Decreased range of motion.   Skin:     General: Skin is warm and dry.      Findings: No erythema or rash.   Neurological:      Mental Status: He is alert.   Psychiatric:         Mood and  Affect: Mood normal.         Behavior: Behavior normal.           Diagnostics:   Recent Results (from the past 24 hour(s))   EKG 12-lead (E and GI Clinics Only)    Collection Time: 02/16/23  4:01 PM   Result Value Ref Range    Systolic Blood Pressure  mmHg    Diastolic Blood Pressure  mmHg    Ventricular Rate 74 BPM    Atrial Rate 74 BPM    MN Interval 148 ms    QRS Duration 108 ms     ms    QTc 419 ms    P Axis 44 degrees    R AXIS 18 degrees    T Axis 48 degrees    Interpretation ECG       Sinus rhythm  Normal ECG  When compared with ECG of 24-DEC-2021 06:52,  No significant change was found     Comprehensive Metabolic Panel    Collection Time: 02/16/23  4:11 PM   Result Value Ref Range    Sodium 137 136 - 145 mmol/L    Potassium 4.6 3.4 - 5.3 mmol/L    Chloride 102 98 - 107 mmol/L    Carbon Dioxide (CO2) 30 (H) 22 - 29 mmol/L    Anion Gap 5 (L) 7 - 15 mmol/L    Urea Nitrogen 11.2 6.0 - 20.0 mg/dL    Creatinine 1.03 0.67 - 1.17 mg/dL    Calcium 9.5 8.6 - 10.0 mg/dL    Glucose 105 (H) 70 - 99 mg/dL    Alkaline Phosphatase 73 40 - 129 U/L    AST 47 10 - 50 U/L    ALT 59 (H) 10 - 50 U/L    Protein Total 7.8 6.4 - 8.3 g/dL    Albumin 4.5 3.5 - 5.2 g/dL    Bilirubin Total 0.4 <=1.2 mg/dL    GFR Estimate >90 >60 mL/min/1.73m2   CBC with platelets and differential    Collection Time: 02/16/23  4:11 PM   Result Value Ref Range    WBC Count 6.1 4.0 - 11.0 10e3/uL    RBC Count 5.45 4.40 - 5.90 10e6/uL    Hemoglobin 15.6 13.3 - 17.7 g/dL    Hematocrit 46.9 40.0 - 53.0 %    MCV 86 78 - 100 fL    MCH 28.6 26.5 - 33.0 pg    MCHC 33.3 31.5 - 36.5 g/dL    RDW 12.9 10.0 - 15.0 %    Platelet Count 195 150 - 450 10e3/uL    % Neutrophils 52 %    % Lymphocytes 36 %    % Monocytes 9 %    % Eosinophils 2 %    % Basophils 1 %    % Immature Granulocytes 0 %    NRBCs per 100 WBC 0 <1 /100    Absolute Neutrophils 3.2 1.6 - 8.3 10e3/uL    Absolute Lymphocytes 2.2 0.8 - 5.3 10e3/uL    Absolute Monocytes 0.6 0.0 - 1.3 10e3/uL     Absolute Eosinophils 0.1 0.0 - 0.7 10e3/uL    Absolute Basophils 0.0 0.0 - 0.2 10e3/uL    Absolute Immature Granulocytes 0.0 <=0.4 10e3/uL    Absolute NRBCs 0.0 10e3/uL   Extra Serum Separator Tube (SST)    Collection Time: 02/16/23  4:11 PM   Result Value Ref Range    Hold Specimen x       EKG: Normal Sinus Rhythm, normal axis, normal intervals, no acute ST/T changes c/w ischemia, no LVH by voltage criteria, unchanged from previous tracings    Revised Cardiac Risk Index (RCRI):  The patient has the following serious cardiovascular risks for perioperative complications:   - No serious cardiac risks = 0 points     RCRI Interpretation: 0 points: Class I (very low risk - 0.4% complication rate)           Signed Electronically by: RAMAN Rae CNP  Copy of this evaluation report is provided to requesting physician.

## 2023-02-16 NOTE — PATIENT INSTRUCTIONS
For informational purposes only. Not to replace the advice of your health care provider. Copyright   2003,  [employer] [facility]. All rights reserved. Clinically reviewed by Veronica Rubi MD. [employer] [id number] - REV .  Preparing for Your Surgery  Getting started  A nurse will call you to review your health history and instructions. They will give you an arrival time based on your scheduled surgery time. Please be ready to share:  Your doctor's clinic name and phone number  Your medical, surgical, and anesthesia history  A list of allergies and sensitivities  A list of medicines, including herbal treatments and over-the-counter drugs  Whether the patient has a legal guardian (ask how to send us the papers in advance)  Please tell us if you're pregnant--or if there's any chance you might be pregnant. Some surgeries may injure a fetus (unborn baby), so they require a pregnancy test. Surgeries that are safe for a fetus don't always need a test, and you can choose whether to have one.   If you have a child who's having surgery, please ask for a copy of Preparing for Your Child's Surgery.    Preparing for surgery  Within 10 to 30 days of surgery: Have a pre-op exam (sometimes called an H&P, or History and Physical). This can be done at a clinic or pre-operative center.  If you're having a , you may not need this exam. Talk to your care team.  At your pre-op exam, talk to your care team about all medicines you take. If you need to stop any medicines before surgery, ask when to start taking them again.  We do this for your safety. Many medicines can make you bleed too much during surgery. Some change how well surgery (anesthesia) drugs work.  Call your insurance company to let them know you're having surgery. (If you don't have insurance, call [phone].)  Call your clinic if there's any change in your health. This includes signs of a cold or flu (sore throat, runny nose, cough, rash, fever). It  also includes a scrape or scratch near the surgery site.  If you have questions on the day of surgery, call your hospital or surgery center.  Eating and drinking guidelines  For your safety: Unless your surgeon tells you otherwise, follow the guidelines below.  Eat and drink as usual until 8 hours before you arrive for surgery. After that, no food or milk.  Drink clear liquids until 2 hours before you arrive. These are liquids you can see through, like water, Gatorade, and Propel Water. They also include plain black coffee and tea (no cream or milk), candy, and breath mints. You can spit out gum when you arrive.  If you drink alcohol: Stop drinking it the night before surgery.  If your care team tells you to take medicine on the morning of surgery, it's okay to take it with a sip of water.  Preventing infection  Shower or bathe the night before and morning of your surgery. Follow the instructions your clinic gave you. (If no instructions, use regular soap.)  Don't shave or clip hair near your surgery site. We'll remove the hair if needed.  Don't smoke or vape the morning of surgery. You may chew nicotine gum up to 2 hours before surgery. A nicotine patch is okay.  Note: Some surgeries require you to completely quit smoking and nicotine. Check with your surgeon.  Your care team will make every effort to keep you safe from infection. We will:  Clean our hands often with soap and water (or an alcohol-based hand rub).  Clean the skin at your surgery site with a special soap that kills germs.  Give you a special gown to keep you warm. (Cold raises the risk of infection.)  Wear special hair covers, masks, gowns and gloves during surgery.  Give antibiotic medicine, if prescribed. Not all surgeries need antibiotics.  What to bring on the day of surgery  Photo ID and insurance card  Copy of your health care directive, if you have one  Glasses and hearing aids (bring cases)  You can't wear contacts during surgery  Inhaler and  eye drops, if you use them (tell us about these when you arrive)  CPAP machine or breathing device, if you use them  A few personal items, if spending the night  If you have . . .  A pacemaker, ICD (cardiac defibrillator) or other implant: Bring the ID card.  An implanted stimulator: Bring the remote control.  A legal guardian: Bring a copy of the certified (court-stamped) guardianship papers.  Please remove any jewelry, including body piercings. Leave jewelry and other valuables at home.  If you're going home the day of surgery  You must have a responsible adult drive you home. They should stay with you overnight as well.  If you don't have someone to stay with you, and you aren't safe to go home alone, we may keep you overnight. Insurance often won't pay for this.  After surgery  If it's hard to control your pain or you need more pain medicine, please call your surgeon's office.  Questions?   If you have any questions for your care team, list them here: _________________________________________________________________________________________________________________________________________________________________________ ____________________________________ ____________________________________ ____________________________________    How to Take Your Medication Before Surgery  - HOLD (do not take) ibuprofen, muscle relaxor 1 week before surgery

## 2023-02-17 LAB
ATRIAL RATE - MUSE: 74 BPM
DIASTOLIC BLOOD PRESSURE - MUSE: NORMAL MMHG
INTERPRETATION ECG - MUSE: NORMAL
P AXIS - MUSE: 44 DEGREES
PR INTERVAL - MUSE: 148 MS
QRS DURATION - MUSE: 108 MS
QT - MUSE: 378 MS
QTC - MUSE: 419 MS
R AXIS - MUSE: 18 DEGREES
SYSTOLIC BLOOD PRESSURE - MUSE: NORMAL MMHG
T AXIS - MUSE: 48 DEGREES
VENTRICULAR RATE- MUSE: 74 BPM

## 2023-02-17 ASSESSMENT — ENCOUNTER SYMPTOMS
COUGH: 0
CHEST TIGHTNESS: 0
WHEEZING: 0
NAUSEA: 0
FEVER: 0
SHORTNESS OF BREATH: 0
HEARTBURN: 0
NECK STIFFNESS: 1
VOMITING: 0
NECK PAIN: 1

## 2023-03-20 DIAGNOSIS — Z98.890 S/P CERVICAL DISC REPLACEMENT: Primary | ICD-10-CM

## 2023-03-21 ENCOUNTER — OFFICE VISIT (OUTPATIENT)
Dept: ORTHOPEDICS | Facility: OTHER | Age: 43
End: 2023-03-21
Attending: STUDENT IN AN ORGANIZED HEALTH CARE EDUCATION/TRAINING PROGRAM
Payer: COMMERCIAL

## 2023-03-21 ENCOUNTER — HOSPITAL ENCOUNTER (OUTPATIENT)
Dept: GENERAL RADIOLOGY | Facility: OTHER | Age: 43
Discharge: HOME | End: 2023-03-21
Attending: STUDENT IN AN ORGANIZED HEALTH CARE EDUCATION/TRAINING PROGRAM
Payer: COMMERCIAL

## 2023-03-21 DIAGNOSIS — Z98.890 S/P CERVICAL DISC REPLACEMENT: ICD-10-CM

## 2023-03-21 DIAGNOSIS — Z98.890 S/P CERVICAL DISC REPLACEMENT: Primary | ICD-10-CM

## 2023-03-21 PROCEDURE — 99024 POSTOP FOLLOW-UP VISIT: CPT | Performed by: STUDENT IN AN ORGANIZED HEALTH CARE EDUCATION/TRAINING PROGRAM

## 2023-03-21 PROCEDURE — 72040 X-RAY EXAM NECK SPINE 2-3 VW: CPT

## 2023-03-21 PROCEDURE — G0463 HOSPITAL OUTPT CLINIC VISIT: HCPCS

## 2023-03-21 NOTE — PROGRESS NOTES
Patient is here for follow up on his cervical spine.  Eden Santillan LPN .....................3/21/2023 12:26 PM

## 2023-04-19 ENCOUNTER — HOSPITAL ENCOUNTER (OUTPATIENT)
Dept: PHYSICAL THERAPY | Facility: OTHER | Age: 43
Setting detail: THERAPIES SERIES
Discharge: HOME | End: 2023-04-19
Attending: STUDENT IN AN ORGANIZED HEALTH CARE EDUCATION/TRAINING PROGRAM
Payer: OTHER MISCELLANEOUS

## 2023-04-19 DIAGNOSIS — Z98.890 S/P CERVICAL DISC REPLACEMENT: ICD-10-CM

## 2023-04-19 PROCEDURE — 97110 THERAPEUTIC EXERCISES: CPT | Mod: GP,PN | Performed by: PHYSICAL THERAPIST

## 2023-04-19 PROCEDURE — 97161 PT EVAL LOW COMPLEX 20 MIN: CPT | Mod: GP,PN | Performed by: PHYSICAL THERAPIST

## 2023-04-19 NOTE — PROGRESS NOTES
04/19/23 1000   General Information   Type of Visit Initial OP Ortho PT Evaluation   Start of Care Date 04/19/23   Referring Physician Dr. Rasheed   Orders Evaluate and Treat   Date of Order 03/21/23   Certification Required? No   Medical Diagnosis s/p cervical disc replacement   Surgical/Medical history reviewed Yes   Precautions/Limitations   (post-operative precautions)   General Information Comments DOS: 2/28/23 C5/6 anterior cervical disc replacement;   Hx of C6/7 anterior cervical disc replacement 6/2022;   Body Part(s)   Body Part(s) Cervical Spine   Presentation and Etiology   Pertinent history of current problem (include personal factors and/or comorbidities that impact the POC) Current work restrictions - not allowed to lift more than 20 lb.  Reports his neck feels like there is a knife stuck in his neck.  Rates pain 3/10 least (with pain meds and muscle relaxers), and 6.5/10 worst (with medication).  Reports continued burning in right thumb, 1st, and 2nd digits.  Numb, burning, tingling sensation.  Constant.  Will get unpredictable sharp/vice-like pain in fingers at times that will last several minutes.  Reports sleep disruption due to pain with head positioning.  Can sleep better in recliner than in his bed.  Waking several times/night.  Is able to compelete ADLs including showering and dressing, still having difficulty pulling shoes on, and doing things more slowly than normal.  Just recently started helping with household chores like carrying a very light basket of laundry.  Generally feels he has gotten weaker.  Also reports limited sensation in left hand.  Has had a couple burns when handling things from the oven.  Not working at this time.  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  Does have an  involved and plans to work with Occupational Health to look  "into employment/work options.  Patient isn't leaving home much due to fear of re-injury, but has been going to [employer] or [employer] to walk a couple times/week.   Impairments A. Pain;B. Decreased WB tolerance;D. Decreased ROM;E. Decreased flexibility;F. Decreased strength and endurance;J. Burning;K. Numbness;L. Tingling;M. Locking or catching;N. Headaches   Symptom Location Locates pain along middle of neck (vertical more than horizontal).  Also pain in mid-back with prolonged standing.   Onset date of current episode/exacerbation 12/20/21   Chronicity Chronic   Pain quality A. Sharp;D. Burning;E. Shooting;F. Stabbing   Pain/symptoms exacerbated by C. Lifting;D. Carrying;G. Certain positions;I. Bending;H. Overhead reach;J. ADL;K. Home tasks;L. Work tasks   Pain/symptoms eased by D. Nothing  (prescription meds help ease pain to help with sleep;)   Progression of symptoms since onset: Unchanged   Current / Previous Interventions   Diagnostic Tests: X-ray   X-ray Results Results   X-ray results Reviewed in EMR from 3/21/23   Prior Level of Function   Prior Level of Function-Mobility independent in all daily living and working full time in heavy job prior to injury   Current Level of Function   Patient role/employment history H. Other   Living environment House/townhome   Home/community accessibility son is helping with snow shoveling tasks   Fall Risk Screen   Fall screen completed by PT   Have you fallen 2 or more times in the past year? No   Have you fallen and had an injury in the past year? No   Is patient a fall risk? No   Cervical Spine   Cervical Left Side Bending ROM 28, pulling more in right side of neck   Cervical Right Rotation ROM 36, tightness in upper back T1/2/3 area   Cervical Left Rotation ROM 32, tightness in upper back T1/2/3 area   Cervical Flexion ROM 22, tight in back of neck \"like a rubber band stretched to it's max\"   Cervical Extension ROM 30, pulling in front of neck around incision area   Cervical " Right Side Bending ROM 15, pain at base of cervical spine   Cervical/Thoracic/Shoulder ROM Comments Limited trunk flexion, extension, bilateral sidebending and rotation in sitting, reports feeling stiff and tension in all directions, maily across mid and upper back.   Shoulder Abd (C5) Strength 4+/5, stretch/pulling feeling at base of neck and upper back   Shoulder Add (C7) Strength 5/5   Shoulder ER (C5, C6) Strength 5-/5   Shoulder IR (C5, C6) Strength 5-/5   Elbow Flexion (C5, C6) Strength 5/5   Elbow Extension (C7) Strength 5/5   Shoulder/Wrist/Hand Strength Comments : R = 107, 110, 98  Average = 105 lb       L = 55,73,71  Average = 66 lb   Palpation Tenderness primarily located along spinous processes of cervical and upper thoracic spine.  Minimal tenderness along paraspinals, scalenes, SCM bilaterally.   Observation Shoulder Flexion: R = 130, pain at base of neck    L = 145, pain at base of neck (less pain than right arm motion)   Integumentary  anterior neck scar, well healed   Planned Therapy Interventions   Planned Therapy Interventions manual therapy;ROM;strengthening;stretching   Planned Modality Interventions   Planned Modality Interventions Cryotherapy;Hot packs   Clinical Impression   Criteria for Skilled Therapeutic Interventions Met yes, treatment indicated   PT Diagnosis impaired mobility   Influenced by the following impairments impaired ROM, flexibility, strength, posture, endurance; Pain; soft tissue restriction;   Functional limitations due to impairments positional tolerance, sleep, transfers/bed mobility, reaching, lifting, carrying, bending   Clinical Presentation Evolving/Changing   Clinical Presentation Rationale clinical observation   Clinical Decision Making (Complexity) Low complexity   Therapy Frequency other (see comments)  (15 visits approved by work comp)   Predicted Duration of Therapy Intervention (days/wks) 3 months   Risk & Benefits of therapy have been explained Yes    Patient, Family & other staff in agreement with plan of care Yes   Clinical Impression Comments Patient presents with extended history of cervical pain with radiculopathy and 2 separate cervical disc replacement surgeries.  Patient would benefit from progression of ROM and soft tissue work for improved mobility and decreased pain for increased positional and activity tolerance.   ORTHO GOALS   PT Ortho Eval Goals 1;2;3;4   Ortho Goal 1   Goal Identifier ROM   Goal Description Patient will demonstrate increased cervical rotation from 36 R and 32 L to 45 degrees or better for increased safety with driving.   Target Date 07/19/23   Ortho Goal 2   Goal Identifier ROM   Goal Description Patient will demonstrate ability to elevate arms to 130 degrees or more with 0-3/10 max pain for increased tolerance to reaching overhead shelving to complete home care tasks.   Target Date 07/19/23   Ortho Goal 3   Goal Identifier sleep   Goal Description Patient will report decreased sleep disruption from every 1 hr to 3 hours or less for increased restful sleep throughout the night.   Target Date 07/19/23   Ortho Goal 4   Goal Identifier Pain   Goal Description Patient will report decreased pain from 6.5/10 worst to 0-3/10 worst with activity for increased ability to complete ADLs and IADLs like grocery shopping and laundry.   Target Date 07/19/23   Total Evaluation Time   PT Bárbara Low Complexity Minutes (81619) 35

## 2023-04-26 ENCOUNTER — HOSPITAL ENCOUNTER (OUTPATIENT)
Dept: PHYSICAL THERAPY | Facility: OTHER | Age: 43
Setting detail: THERAPIES SERIES
Discharge: HOME | End: 2023-04-26
Attending: STUDENT IN AN ORGANIZED HEALTH CARE EDUCATION/TRAINING PROGRAM
Payer: OTHER MISCELLANEOUS

## 2023-04-26 PROCEDURE — 97140 MANUAL THERAPY 1/> REGIONS: CPT | Mod: GP,PN | Performed by: PHYSICAL THERAPIST

## 2023-05-05 ENCOUNTER — HOSPITAL ENCOUNTER (OUTPATIENT)
Dept: PHYSICAL THERAPY | Facility: OTHER | Age: 43
Setting detail: THERAPIES SERIES
Discharge: HOME | End: 2023-05-05
Attending: STUDENT IN AN ORGANIZED HEALTH CARE EDUCATION/TRAINING PROGRAM
Payer: OTHER MISCELLANEOUS

## 2023-05-05 PROCEDURE — 97140 MANUAL THERAPY 1/> REGIONS: CPT | Mod: GP,PN | Performed by: PHYSICAL THERAPIST

## 2023-05-08 ENCOUNTER — TELEPHONE (OUTPATIENT)
Dept: FAMILY MEDICINE | Facility: OTHER | Age: 43
End: 2023-05-08
Payer: COMMERCIAL

## 2023-05-08 DIAGNOSIS — M62.838 MUSCLE SPASM: ICD-10-CM

## 2023-05-08 DIAGNOSIS — M54.12 CERVICAL RADICULOPATHY: Primary | ICD-10-CM

## 2023-05-08 RX ORDER — METHOCARBAMOL 750 MG/1
750 TABLET, FILM COATED ORAL EVERY 6 HOURS PRN
Qty: 30 TABLET | Refills: 1 | Status: SHIPPED | OUTPATIENT
Start: 2023-05-08

## 2023-05-08 NOTE — PROGRESS NOTES
"Patient Information     Patient Name MRN Sex Matt Thao [id number] Male 1980      Progress Notes by Franklin Martinez, PT at 2017  1:48 PM     Author:  Franklin Martinez, PT Service:  (none) Author Type:  PT- Physical Therapist     Filed:  2017  2:39 PM Date of Service:  2017  1:48 PM Status:  Signed     :  Franklin Martinez PT (PT- Physical Therapist)            [facility]  Outpatient PT - Daily Note     Date of Service: 2017     Visit 5  12 authorized by Work Comp.     Patient Name: Matt May   YOB: 1980   Referring MD/Provider: Dr. So  Medical and Treatment Diagnosis: Left ankle pain, unspecified chronicity   PT Treatment Diagnosis: pain, weakness, impaired ROM, impaired gait, impaired neuromuscular control  Insurance: Work Comp  Start of Service: 17  Certification Dates: Start of Service: 17  Medicare/MA Re-Cert Due: 10/12/17               Subjective      Patient states the anterior ankle and heel are more sore today.  States heel pain increases with standing.  States the anterior and lateral ankle \"feel like they are on fire\" he stands.  He has a follow up with Dr. So on 17.      Rates pain: 3/10 pain at rest.   Left heel pain increases to a 6/10.                 Objective    Left ankle ROM:  Dorsiflexion= lacking 5 degrees from neutral  Plantarflexion= 35 degrees  Inversion= 12 degrees  Eversion= 5 degrees      Today's Intervention:      Exercises:  Flex and extend toes   Active PF/DF  Ankle circles x 20 each way   Ankle alphabet   Ankle IV and EV AROM  Left calf stretch with towel 3 x 30\"----Dorsiflexion to neutral    Ankle PF with black band x 20  Ankle DF with red x 20    Towel scrunch    Grade 2-3 talocrural distraction and A/P mobs     BAPS level 3--seated: forward/back, and circles      Cold pack x 10 minutes, left ankle       Home Exercise Program:  Work on HEP as listed above 5 times a day  Discussed " ASSESSMENT/ PLAN:    Type 2 diabetes mellitus without complication, without long-term current use of insulin (CMD)  Overall well controlled off medication post-gastric sleeve.  Plan to work on getting weight to trend down again.  Standing orders in place for future testing.  Reminded the patient that she is due for her updated eye exam.     - Microalbumin Urine Random; Future    Rotator cuff syndrome of left shoulder  The patient has been having more problems with her shoulder again.  Joint injection completed and post-injection cares reviewed.  Work on ROM exercises as discussed after the effects of the shot become apparent.    - triamcinolone acetonide (KENALOG-40) 40 MG/ML injection 40 mg  - DRAIN OR INJECT LARGE JOINT OR BURSA WOUT ULTRASOUND GUIDE    Mixed hyperlipidemia  Well controlled on statin therapy.  No refills needed at this time.  Work on diet.      Acquired hypothyroidism  Well corrected on her present dose of Levothyroxine.  No refills needed at this time.  Standing order for future testing in place.      Essential hypertension, benign  Well controlled on present anti-hypertensive regimen.  No refills are needed at this time.    - CBC with Automated Differential; Future    RLS (restless legs syndrome)  Refill Mirapex which works well for her but only at a very low dose.    - pramipexole (MIRAPEX) 0.125 MG tablet; TAKE 1/2 (ONE-HALF) TABLET BY MOUTH AT BEDTIME FOR  RESTLESS  LEGS  Dispense: 45 tablet; Refill: 3    Recurrent major depressive disorder, in remission (CMD)  Continue present dosing on Fluoxetine.  The patient remains in remission with medication management.    - fluoxetine (PROzac) 40 MG capsule; Take 1 capsule by mouth daily.  Dispense: 90 capsule; Refill: 1    Visit for screening mammogram  Update mammogram when due.    - Mammo Screening Bilateral; Future      See Patient Instruction section  Return in about 4 months (around 9/14/2023) for Well woman visit, no Pap, and follow-up diabetes  "use of cold pack and elevation for pain and inflammation control  Warm water for ROM exercises may be helpful  Flex and extend toes   Active PF/DF  Ankle circles x 20 each way   Ankle alphabet   Left calf stretch with towel 3 x 30\"  Ankle PF with green band x 20  Ankle DF with red x 20  Towel scrunch  Weight shifting onto left foot with canes   Ankle IV and EV AROM        Assessment    Therapist Assessment / Clinical Impression:  Signs and symptoms consistent with left lateral ankle sprain.  The patient is appropriate for physical therapy to address their pain, functional limitations, and physical impairments.     Functional Impairment(s): See subjective on initial evaluation and Functional Assessment / Summary Report from [facility].    Physical Impairment(s):  pain, weakness, impaired ROM, impaired gait, impaired neuromuscular control      Patient Specific Functional and Pain Scales (PSFS):    Clinician Instructions: Complete after the history and before the exam.    Initial Assessment: We want to know what 3 activities in your life you are unable to perform, or are having the most difficulty performing, as a result of your chief problem. Please list and score at least 3 activities that you are unable to perform, or having the most difficulty performing, because of your chief problem.   Patient Specific Activity Scoring Scheme (score one number for each activity):   Activity Score (0-10)  0= Unable to perform activity  10= Able to perform activity at same level as before injury or problem   1. Walking with full weight on left foot  0/10   2. Single leg stand on left foot 0/10   3. Squatting  0/10   4. Work  0/10   5.     Totals:  0/40 = 0 % ability which relates to 100% impairment    Patient verbally states that they understand that the information they have provided above is current and complete to the best of their knowledge.    Patient Specific Functional Scale Modifier Scale Conversion: (patient's modifier that " with labs prior to visit.    ___________________________________________________  SUBJECTIVE:  Nessa is a 65 year old female who is seen for follow-up of diabetes mellitus type 2 well controlled and not insulin dependent, hyperlipidemia, hypertension, hypothyroidism.  The patient completed her labs prior to her office visit today.  She has continued to work at [employer] and is planning to retire at the end of this year if all goes well.  The job is physical and she gets about 22,000 steps per day when working.  She has had more issues with her left shoulder in terms of pain and it is now more restricted again in motion.  The left shoulder hurts daily.  She would like to get another joint injection as she last had one in 2016 with good long term results.    Nursing notes reviewed and accepted.     REVIEW OF SYSTEMS:  In addition to that noted above, review of systems is remarkable for:  Weight trending up - has been eating more desserts at night, no chest pain, no SOB, no abdominal pain, no leg swelling, no new skin issues, left shoulder pain as described    Patient Active Problem List   Diagnosis   • Depressive disorder, not elsewhere classified   • Essential hypertension, benign   • Rosacea   • Family history of ischemic heart disease   • Hypothyroidism   • RLS (restless legs syndrome)   • Carpal tunnel syndrome on both sides   • Obesity (BMI 30-39.9)   • History of vitamin D deficiency   • Type 2 diabetes mellitus without complication (CMD)   • Rotator cuff syndrome of left shoulder   • Mixed hyperlipidemia   • Recurrent major depressive disorder, in remission (CMD)     MEDICATIONS and HISTORY reviewed and updated in the electronic health record.  ALLERGIES:   Allergen Reactions   • Penicillins ANAPHYLAXIS   • Sulfa Antibiotics HIVES and RASH     Septra,unknown        OBJECTIVE:  Visit Vitals  /65   Pulse 78   Temp 97.4 °F (36.3 °C) (Temporal)   Ht 5' (1.524 m)   Wt 85.7 kg (189 lb)   LMP 05/28/2005   SpO2  96%   BMI 36.91 kg/m²     General - alert, cooperative  Eyes - PERRL, conjunctiva normal  Neck - no lymphadenopathy and no thyromegaly  Lungs - normal and clear to auscultation, easy respirations  Heart - S1 and S2, Regular rate and rhythm and No murmur, rub, gallop  Abdomen - No masses, organomegaly, Bowel sounds normoactive, No rebound and No guarding  Extremities - No cyanosis, clubbing, No edema and there is joint line tenderness and limited ROM of the left shoulder - especially abduction, but there is no effusion present.    Skin - There are no bruises, rashes or lesions that appear significant that are visible.    Procedure:  Joint/Bursa Injection:  Verbal consent was obtained from the patient prior to the procedure.  The right shoulder was sterilely prepped with Betadine and alcohol.  The shoulder was sterilely infiltrated with 2 mL of 2% lidocaine, 2 mL of 0.75% Marcaine and 40 mg of Kenalog under local anesthetic spray via a lateral/posterior approach.  The patient tolerated the procedure well.  Post injection instructions were given.  Questions answered.    EPIC chart reviewed today including recent lab results.    Greater than 30 minutes spent in direct face to face patient care and care coordination.            correlates with pt's score on PSFS): 0-CN (100% Impaired).    G codes and Modifier taken from pt completing a PSFS: completed at initial evaluation   Initial Primary G Code and Modifier:    Per the Patient's intake and/or assessment the Primary G Code is: Mobility .   The Patient's Impairment, Limitation or Restriction Modifier would be best described as: CN - 100% Impairment.     Goal Primary G Code and Modifier:    The Patient's G Code Goal would be: Mobility    The Patient's Impairment, Limitation or Restriction Modifier goal would be best described as: CJ - 20% - 40% Impairment.         Goals:  Functional Short Term Goals (4 weeks):   Patient will walk with a normal gait pattern.  Patient will be able to sleep with no disruptions due to pain.  Patient will have improved lower extremity mobility to allow for improved dressing and self-cares with minimal to no pain.    Functional Long Term Goals (8 weeks):   Patient will self-manage symptoms and return to prior level of function.    Patient will be independent in their Home Exercise Program.  Patient will tolerate walking with less than 2/10 pain up to 30 minutes.  Patient will complete stairs with minimal to no pain.  Patient will demonstrate improved strength to allow squatting and lifting activities with no difficulty completing work related tasks.  Stand on left foot without pain.       Patient participated in goal selection and understand(s) the plan of care: Yes   Patient Potential for Achieving Desired Outcome: Good       Response to Intervention:  Good response to treatment. Patient verbalized understanding of HEP.       Plan    Treatment Plan:      Frequency:   12 visits    Duration of Treatment: 8 weeks    Planned Interventions:    Home Exercise Program development  Therapeutic Exercise (ROM & Strengthening)  Therapeutic Activities  Neuromuscular Re-education  Manual Therapy  Ultrasound  E-Stim  Gait Training  Hot Packs / Cold Pack  Modalities  Vasopneumatic Compression with Cold Therapy ( [employer] Ready )  Phonophoresis with Ketoprofen  Iontophoresis with Dexamethasone      Plan for next visit:  Progress exercises as symptoms allow.  Manual therapy and modalities as indicated.

## 2023-05-08 NOTE — TELEPHONE ENCOUNTER
Last office visit with Marques Veras MD was 5-18-22, no future visits listed for patient at this time.      Vee Lamb LPN 5/8/2023 3:32 PM

## 2023-05-08 NOTE — TELEPHONE ENCOUNTER
Reason for call: Medication or medication refill    Name of medication requested: Methocarbamol    Are you out of the medication? 3 left   Takes 2 a day     What pharmacy do you use? [facility]     Preferred method for responding to this message: Telephone Call    Phone number patient can be reached at: Home number on file [phone] (home)    If we cannot reach you directly, may we leave a detailed response at the number you provided? Yes       Myrna Paulino on 5/8/2023 at 2:15 PM

## 2023-05-10 ENCOUNTER — HOSPITAL ENCOUNTER (OUTPATIENT)
Dept: PHYSICAL THERAPY | Facility: OTHER | Age: 43
Setting detail: THERAPIES SERIES
Discharge: HOME | End: 2023-05-10
Attending: STUDENT IN AN ORGANIZED HEALTH CARE EDUCATION/TRAINING PROGRAM
Payer: OTHER MISCELLANEOUS

## 2023-05-10 PROCEDURE — 97140 MANUAL THERAPY 1/> REGIONS: CPT | Mod: GP,PN | Performed by: PHYSICAL THERAPIST

## 2023-05-12 ENCOUNTER — HOSPITAL ENCOUNTER (OUTPATIENT)
Dept: PHYSICAL THERAPY | Facility: OTHER | Age: 43
Setting detail: THERAPIES SERIES
Discharge: HOME | End: 2023-05-12
Attending: STUDENT IN AN ORGANIZED HEALTH CARE EDUCATION/TRAINING PROGRAM
Payer: OTHER MISCELLANEOUS

## 2023-05-12 PROCEDURE — 97110 THERAPEUTIC EXERCISES: CPT | Mod: GP,PN | Performed by: PHYSICAL THERAPIST

## 2023-05-12 PROCEDURE — 97012 MECHANICAL TRACTION THERAPY: CPT | Mod: GP,PN | Performed by: PHYSICAL THERAPIST

## 2023-05-12 NOTE — PROGRESS NOTES
"[facility]    Outpatient Physical Therapy Progress Note    Patient: Matt May  : 1980    Beginning/End Dates of Reporting Period:  23 to 23    Referring Provider: Dr. Rasheed    Therapy Diagnosis: s/p cervical disc replacement           Client Self Report: Patient continues to have constant neck and LUE pain and finger tingling.  Feels like neck needs to \"pop\" like when a person cracks knucles.  More difficulty turning to the left than right.   Continued pain with scapular retraction/trying to sit up really straight.  Feels like something jabbing inbetween shoulder blades.  Was not able to get questions answered by MD as he is not back in the office until next Monday.  Yesterday went for a 4x4 ride with his brother and shoot his gun.  Reports he took it easy, mostly on tar roads, but pain afterward was horrible.  Prior to meds rated pain 7.5-8/10.  Took muscle relaxers and pain meds and after rated pain 4.5-5/10.  Did not sleep well.  Currently rates pain 7.5/10 burnining in fingers and 5-6/10 pressure/compression feeling in neck.    Objective Measurements:  Rotation:  : R = 36  L = 32  : R = 38  L = 38              Goals:  Goal Identifier ROM   Goal Description Patient will demonstrate increased cervical rotation from 36 R and 32 L to 45 degrees or better for increased safety with driving.   Target Date 23   Date Met      Progress (detail required for progress note): : R = 38   L = 38     Goal Identifier ROM   Goal Description Patient will demonstrate ability to elevate arms to 130 degrees or more with 0-3/10 max pain for increased tolerance to reaching overhead shelving to complete home care tasks.   Target Date 23   Date Met      Progress (detail required for progress note): not met     Goal Identifier sleep   Goal Description Patient will report decreased sleep " disruption from every 1 hr to 3 hours or less for increased restful sleep throughout the night.   Target Date 07/19/23   Date Met      Progress (detail required for progress note): not met     Goal Identifier Pain   Goal Description Patient will report decreased pain from 6.5/10 worst to 0-3/10 worst with activity for increased ability to complete ADLs and IADLs like grocery shopping and laundry.   Target Date 07/19/23   Date Met      Progress (detail required for progress note): not met         Plan:  Changes to therapy plan of care: Mechanical Traction    Discharge:  No

## 2023-05-15 ENCOUNTER — HOSPITAL ENCOUNTER (OUTPATIENT)
Dept: PHYSICAL THERAPY | Facility: OTHER | Age: 43
Setting detail: THERAPIES SERIES
Discharge: HOME | End: 2023-05-15
Attending: STUDENT IN AN ORGANIZED HEALTH CARE EDUCATION/TRAINING PROGRAM
Payer: OTHER MISCELLANEOUS

## 2023-05-15 PROCEDURE — 97110 THERAPEUTIC EXERCISES: CPT | Mod: GP,PN | Performed by: PHYSICAL THERAPIST

## 2023-05-15 NOTE — PROGRESS NOTES
[facility]    Outpatient Physical Therapy Progress Note  *Progress note was completed 23 as well.    Patient: Matt May  : 1980    Beginning/End Dates of Reporting Period:  23 to 23    Referring Provider: Dr. Rasheed    Therapy Diagnosis: s/p cervical disc replacement           Client Self Report: Patient states that after traction was really stiff and sore.  No change in symptoms after traction either.  Took until  night for symptoms to return to baseline.  Not sure if all symptoms were from traction or if residual pain from 4x4 ride.  Continues to have tingling and throbbing.  Rates pain 5/10 since last night.    Objective Measurements:  Objective Measure: Cervical Flexion:   = 22  5/15 = 20    Objective Measure: Extension:   = 30  5/15 = 30    Objective Measure: Sidebending Right:   = 15  5/15 = 30    Objective Measure: Sidebending Left:   = 28  5/15 = 25    Objective Measure: Rotation Right:   = 36  5/15 = 34    Objective Measure: Rotation Left:   = 32  5/15 = 35      Shoulder Flexion (standing):  5/15: R = 145   L = 145      Goals:  Goal Identifier ROM   Goal Description Patient will demonstrate increased cervical rotation from 36 R and 32 L to 45 degrees or better for increased safety with driving.   Target Date 23   Date Met      Progress (detail required for progress note): Not met:  : R = 38   L = 38, 5/15: R = 34 L = 35     Goal Identifier ROM   Goal Description Patient will demonstrate ability to elevate arms to 130 degrees or more with 0-3/10 max pain for increased tolerance to reaching overhead shelving to complete home care tasks.   Target Date 23   Date Met      Progress (detail required for progress note): Goal met for ROM, but not for pain;  Bilateral shoulder flexion in standing = 145 (pinching feeling between shoulder  blades, T5)     Goal Identifier sleep   Goal Description Patient will report decreased sleep disruption from every 1 hr to 3 hours or less for increased restful sleep throughout the night.   Target Date 07/19/23   Date Met      Progress (detail required for progress note): Not met:  Patient denies sleep improvement.  Reports last night slept from 2am-5am.     Goal Identifier Pain   Goal Description Patient will report decreased pain from 6.5/10 worst to 0-3/10 worst with activity for increased ability to complete ADLs and IADLs like grocery shopping and laundry.   Target Date 07/19/23   Date Met      Progress (detail required for progress note): Not met         Plan:  Patient has follow up scheduled with surgeon 5/16/23.  Has been seen 6 therapy sessions with minimal change in ROM or pain symptoms.  Therapy has included manual techniques for soft tissue mobilization and ROM, stretching, and 1 trial of intermittent cervical traction at 16 lb tension for 10 min.      Patient continues to report burning and tingling in left hand and arm.  Significant sleep disruption.      Discharge:  No - Will follow MD recommendations at next follow up visit 5/16/23.

## 2023-05-16 ENCOUNTER — HOSPITAL ENCOUNTER (OUTPATIENT)
Dept: GENERAL RADIOLOGY | Facility: OTHER | Age: 43
Discharge: HOME | End: 2023-05-16
Attending: STUDENT IN AN ORGANIZED HEALTH CARE EDUCATION/TRAINING PROGRAM
Payer: OTHER MISCELLANEOUS

## 2023-05-16 ENCOUNTER — OFFICE VISIT (OUTPATIENT)
Dept: ORTHOPEDICS | Facility: OTHER | Age: 43
End: 2023-05-16
Attending: INTERNAL MEDICINE
Payer: OTHER MISCELLANEOUS

## 2023-05-16 DIAGNOSIS — Z98.890 S/P CERVICAL DISC REPLACEMENT: ICD-10-CM

## 2023-05-16 DIAGNOSIS — Z98.890 S/P CERVICAL DISC REPLACEMENT: Primary | ICD-10-CM

## 2023-05-16 PROCEDURE — 72040 X-RAY EXAM NECK SPINE 2-3 VW: CPT

## 2023-05-16 PROCEDURE — 99024 POSTOP FOLLOW-UP VISIT: CPT | Performed by: STUDENT IN AN ORGANIZED HEALTH CARE EDUCATION/TRAINING PROGRAM

## 2023-05-16 RX ORDER — PREGABALIN 50 MG/1
50 CAPSULE ORAL 2 TIMES DAILY
Qty: 60 CAPSULE | Refills: 3 | Status: SHIPPED | OUTPATIENT
Start: 2023-05-16

## 2023-05-16 RX ORDER — METHOCARBAMOL 750 MG/1
750 TABLET, FILM COATED ORAL 4 TIMES DAILY PRN
Qty: 90 TABLET | Refills: 3 | Status: SHIPPED | OUTPATIENT
Start: 2023-05-16

## 2023-05-16 NOTE — PROGRESS NOTES
Patient is here for follow up on his cervical spine.  Eden Santillan LPN .....................5/16/2023 2:47 PM

## 2023-05-16 NOTE — PROGRESS NOTES
Visit Date: 2023    The patient is a 42-year-old male who is now approximately 2 months out from a C5-C6 anterior cervical total disk replacement.  He had had a prior C6-C7 total disk replacement and subsequently developed the adjacent segment degeneration.  Overall, he had been doing quite well, but has developed some dysesthetic burning pain in the left greater than the right hand over the last month or so.  He unfortunately also had some cervical traction done during a physical therapy session, which exacerbated his symptoms.    PHYSICAL EXAMINATION:  GENERAL:  Well-appearing, well-nourished.  MUSCULOSKELETAL/NEUROLOGIC:  He has 5/5 strength in bilateral deltoids, biceps, triceps, wrist extension, flexion, hand  and hand intrinsics.  He has normal sensation to light touch throughout bilateral upper extremities.    Imaging available for review today includes AP and lateral radiographs that show adequate positioning of his hardware.  No evidence of early hardware failure or migration, lucency or heterotopic ossification.    ASSESSMENT AND PLAN:  The patient is a 42-year-old male who is now 2 months status post C5-C6 anterior cervical total disk replacement, doing well in the early postoperative period with some dysesthetic burning and left greater than right sided arm pain.  At this point, we will begin him on Lyrica as he has failed gabapentin in the past secondary to side effects, to help with his dysesthetic burning pain in the healing.  He will continue physical therapy and return to clinic in 5 weeks for routine evaluation.    This clinic visit took 15 minutes.    Walt Rasheed MD        D: 2023   T: 2023   MT: [email or web address]    Name:     JUDY SAHNIAbelardo  MRN:      [id number]        Account:    [id number]   :      1980           Visit Date: 2023     Document: [id number]

## 2023-05-17 NOTE — PROGRESS NOTES
"     Outpatient Physical Therapy Progress Note    Patient:  Matt May  :  1980    Referring Provider:  Dr. Rasheed    Diagnosis:  S/p cervical disc replacement        Report:  Patient was seen in physical therapy last on 5/15/23.  Was seen by Dr. Rasheed on 23.  Physician Note was reviewed.  Message from MD office received to discontinue mechanical traction.  Spoke with Eden Santillan LPN regarding previous treatment and patient response on 23.      Patient called and cancelled treatment session for 23.  I called and spoke with patient on the phone.    Patient reports his neck is sore.  Patient had reported on 5/15/23 that his neck symptoms (stiffness) had returned to baseline by  afternoon.  Discussed with patient that his neck soreness may have to do with his 4x4 ride and shooting a gun on 23 as he had reported \"horrible\" neck pain afterward.  Discussed that he had reported positive response to initial trial of manual traction at previous session (which was the only thing up to that point that patient reported to have helped), and that was the reason for trial of mechanical traction.  Mechanical traction applied was 16 lb of tension which is far below the 7% (24lb) to 13% (44.7lb) body weight minimum needed for his size to apply any amount of spine/disc motion/separation.  The increased muscle soreness could be from attempts to stabilize head while riding in 4x4 and/or shooting.  It could also be in response to the mechanical traction.  The lower intensity mechanical traction is indicated to reduce muscle spasm, so this is less likely.    Please see previous progress notes for further information regarding patient's ongoing subjective symptoms.    Plan:  Patient and I agree that in future therapy visits, plan of care will change to focus on therapeutic exercise.  Will assess tolerance and see if change in plan of care results in any improvement in symptoms.    "     Beginning/End Dates of Progress Note Reporting Period:  4/19/23 to 05/17/2023    Referring Provider:  Walt Rasheed

## 2023-05-24 ENCOUNTER — THERAPY VISIT (OUTPATIENT)
Dept: PHYSICAL THERAPY | Facility: OTHER | Age: 43
End: 2023-05-24
Attending: STUDENT IN AN ORGANIZED HEALTH CARE EDUCATION/TRAINING PROGRAM
Payer: OTHER MISCELLANEOUS

## 2023-05-24 DIAGNOSIS — Z98.890 S/P CERVICAL DISC REPLACEMENT: Primary | ICD-10-CM

## 2023-05-24 PROCEDURE — 97110 THERAPEUTIC EXERCISES: CPT | Mod: GP,PN | Performed by: PHYSICAL THERAPIST

## 2023-05-26 ENCOUNTER — THERAPY VISIT (OUTPATIENT)
Dept: PHYSICAL THERAPY | Facility: OTHER | Age: 43
End: 2023-05-26
Attending: STUDENT IN AN ORGANIZED HEALTH CARE EDUCATION/TRAINING PROGRAM
Payer: OTHER MISCELLANEOUS

## 2023-05-26 DIAGNOSIS — Z98.890 S/P CERVICAL DISC REPLACEMENT: ICD-10-CM

## 2023-05-26 PROCEDURE — 97110 THERAPEUTIC EXERCISES: CPT | Mod: GP,PN | Performed by: PHYSICAL THERAPIST

## 2023-06-16 ENCOUNTER — THERAPY VISIT (OUTPATIENT)
Dept: PHYSICAL THERAPY | Facility: OTHER | Age: 43
End: 2023-06-16
Attending: STUDENT IN AN ORGANIZED HEALTH CARE EDUCATION/TRAINING PROGRAM
Payer: OTHER MISCELLANEOUS

## 2023-06-16 DIAGNOSIS — Z98.890 S/P CERVICAL DISC REPLACEMENT: Primary | ICD-10-CM

## 2023-06-16 DIAGNOSIS — M50.123 CERVICAL DISC DISORDER AT C6-C7 LEVEL WITH RADICULOPATHY: ICD-10-CM

## 2023-06-16 PROCEDURE — 97110 THERAPEUTIC EXERCISES: CPT | Mod: GP,PN | Performed by: PHYSICAL THERAPIST

## 2023-06-16 NOTE — PROGRESS NOTES
"   06/16/23 1446   Appointment Info   Signing clinician's name / credentials Jono Renteria, PT   Total/Authorized Visits 12   Visits Used 9   Progress Note/Certification   Progress Note Completed Date 05/12/23   GOALS   PT Goals 2;3;4   PT Goal 1   Goal Identifier ROM   Goal Description Patient will demonstrate increased cervical rotation from 36 R and 32 L to 45 degrees or better for increased safety with driving.   Goal Progress 5/12: R = 38   L = 38     6/16: R = 26, 35  L = 30, 44   Target Date 07/19/23   PT Goal 2   Goal Identifier ROM   Goal Description Patient will demonstrate ability to elevate arms to 130 degrees or more with 0-3/10 max pain for increased tolerance to reaching overhead shelving to complete home care tasks.   Goal Progress 6/16:  142 degrees bilateral shoulder flexion, but continued pain greater than 3/10   Target Date 07/19/23   PT Goal 3   Goal Identifier sleep   Goal Description Patient will report decreased sleep disruption from every 1 hr to 3 hours or less for increased restful sleep throughout the night.   Goal Progress Not met - reports of continued sleep disruption (see Subjective portion of note)   Target Date 07/19/23   PT Goal 4   Goal Identifier Pain   Goal Description Patient will report decreased pain from 6.5/10 worst to 0-3/10 worst with activity for increased ability to complete ADLs and IADLs like grocery shopping and laundry.   Goal Progress Not met - continued pain rated 5-8/10   Target Date 07/19/23   Subjective Report   Subjective Report Patient was last seen 5/26/23.  Reports he was able to continue with HEP for stretching.  Patient has been seen 9/12 approved physical therapy visits to this point (including today).  Scheduled follow-up with surgeon on 7/6/23.  Stopped taking recent prescription, reports it did not improve symptoms and caused some skin irritation.  Took 2 muscle relaxers this morning and reports \"I'm still numb and on fire\".  Rates pain 8/10 " "sometimes, 5-6/10 now and at the least.  Describes as burning, numbness, tingling, hot/cold.  Takes aleve and methrocarbamol for pain.  Continues to wake 2-3x/night due to pain.  Reports he has gotten about 2-3 hr of sleep in the last 2 nights total due to constant pain.  Sometimes able to take occasional naps during the day.  Is able to occasionally position the left arm overhead in just the right position to reduce pain for short periods of time.  Pain gets worse with trying to lay flat.  Very difficulty to find a comfortable position when laying down with a pillow under head.  Was getting headaches for a while, but thinks it may have been from trial of a new medication.  Since stopping meds, headaches seem to be gone.  Overall does not feel symptoms in neck or upper extremities are improving.   Objective Measures   Objective Measures Objective Measure 1;Objective Measure 2;Objective Measure 3;Objective Measure 4;Objective Measure 5;Objective Measure 6   Objective Measure 1   Objective Measure Cervical Flexion:   Details 5/15 = 20      6/16 = 36 \"stretching, tight pain\"   Objective Measure 2   Objective Measure Extension:   Details 5/15 = 30      6/16 = 28   Objective Measure 3   Objective Measure Sidebending Right:   Details 5/15 = 30      6/16 = 25, pain in center of neck   Objective Measure 4   Objective Measure Sidebending Left:   Details 5/15 = 25     6/16 = 28   Objective Measure 5   Objective Measure Rotation Right:   Details 5/15 = 34      6/16 = 26, 35   Objective Measure 6   Objective Measure Rotation Left:   Details 5/15 = 35      6/16 = 30, 44   Treatment Interventions (PT)   Interventions Therapeutic Procedure/Exercise;Manual Therapy   Therapeutic Procedure/Exercise   Therapeutic Procedures: strength, endurance, ROM, flexibillity minutes (86096) 50   Ther Proc 1 UBE 4 min, self-selected pace;     Chest height rows at [place], 3kg 10 x 1 - stopped due to reported increased discomfort between shoulder " "blades;      Standing IR R/L UE 3kg 10 x 2 bilatrally;       Standing L/R UE ER 2 kg# 10 x 2;      Forward Bent EXT - started to get muscle cramping so stopped;      Standing FF 0# 15 x 1 shoulder height;     Standing ABD 0# 15 x 1 shoulder height;      Triceps press on RYLAND 3 kg, 10 x 2;       Added FF, ABD, IR, ER to HEP.  Will try FB EXT as tolerated, but may not continue if muscle cramps continue to be a problem.  Instructed reps, frequency, and appropriate progression.  Patient voiced understanding.   Skilled Intervention exercise instruction   Patient Response/Progress Shoulder FF: R = 142   L = 142 \"burns in the back of the arms\", left much worse thant the right   Plan   Home program 4/19: gentle neck stretches;    6/16: standing FF, ABD, IR, ER, trial of Forward Bent EXT as tolerated;   Total Session Time   Timed Code Treatment Minutes 50   Total Treatment Time (sum of timed and untimed services) 50   Medicare Claim Information   Medical Diagnosis s/p cervical disc replacement   PT Diagnosis impaired mobility   Start of Care Date 04/19/23   Onset date of current episode/exacerbation 12/20/21       "

## 2023-06-28 ENCOUNTER — THERAPY VISIT (OUTPATIENT)
Dept: PHYSICAL THERAPY | Facility: OTHER | Age: 43
End: 2023-06-28
Attending: STUDENT IN AN ORGANIZED HEALTH CARE EDUCATION/TRAINING PROGRAM
Payer: OTHER MISCELLANEOUS

## 2023-06-28 DIAGNOSIS — Z98.890 S/P CERVICAL DISC REPLACEMENT: Primary | ICD-10-CM

## 2023-06-28 DIAGNOSIS — M50.123 CERVICAL DISC DISORDER AT C6-C7 LEVEL WITH RADICULOPATHY: ICD-10-CM

## 2023-06-28 PROCEDURE — 97110 THERAPEUTIC EXERCISES: CPT | Mod: GP,PN | Performed by: PHYSICAL THERAPIST

## 2023-06-30 ENCOUNTER — THERAPY VISIT (OUTPATIENT)
Dept: PHYSICAL THERAPY | Facility: OTHER | Age: 43
End: 2023-06-30
Attending: STUDENT IN AN ORGANIZED HEALTH CARE EDUCATION/TRAINING PROGRAM
Payer: OTHER MISCELLANEOUS

## 2023-06-30 DIAGNOSIS — Z98.890 S/P CERVICAL DISC REPLACEMENT: Primary | ICD-10-CM

## 2023-06-30 DIAGNOSIS — M50.123 CERVICAL DISC DISORDER AT C6-C7 LEVEL WITH RADICULOPATHY: ICD-10-CM

## 2023-06-30 PROCEDURE — 97110 THERAPEUTIC EXERCISES: CPT | Mod: GP,PN | Performed by: PHYSICAL THERAPIST

## 2023-06-30 NOTE — PROGRESS NOTES
"    PLAN  Follow up with surgeon scheduled for 7/6/23.    Patient has been seen 11/12 approved work comp visits.  Please see \"subjective\" and \"patient response/progress\" portions of note below for patient information.  Minimal change in cervical ROM since last progress note.      Beginning/End Dates of Progress Note Reporting Period:  05/12/23 to 06/30/2023    Referring Provider:  Walt Rasheed         06/30/23 1400   Appointment Info   Signing clinician's name / credentials Jono Renteria, PT   Total/Authorized Visits 12   Visits Used 11   Progress Note/Certification   Progress Note Completed Date 05/12/23   GOALS   PT Goals 2;3;4   PT Goal 1   Goal Identifier ROM   Goal Description Patient will demonstrate increased cervical rotation from 36 R and 32 L to 45 degrees or better for increased safety with driving.   Goal Progress 5/12: R = 38   L = 38     6/16: R = 26, 35  L = 30, 44       6/30: R = 30  L = 45   Target Date 07/19/23   PT Goal 2   Goal Identifier ROM   Goal Description Patient will demonstrate ability to elevate arms to 130 degrees or more with 0-3/10 max pain for increased tolerance to reaching overhead shelving to complete home care tasks.   Goal Progress 6/16:  142 degrees bilateral shoulder flexion, but continued pain greater than 3/10   Target Date 07/19/23   PT Goal 3   Goal Identifier sleep   Goal Description Patient will report decreased sleep disruption from every 1 hr to 3 hours or less for increased restful sleep throughout the night.   Goal Progress Not met - reports of continued sleep disruption (see Subjective portion of note)   Target Date 07/19/23   PT Goal 4   Goal Identifier Pain   Goal Description Patient will report decreased pain from 6.5/10 worst to 0-3/10 worst with activity for increased ability to complete ADLs and IADLs like grocery shopping and laundry.   Goal Progress Not met - continued pain rated 5-8/10   Target Date 07/19/23   Subjective Report   Subjective Report " "Patient reports in addition to burning, has an itching sensation in the back of the arm.  Feels like it can't be scratched.  Also feels a little looser in the neck, but also burning.  Burning in fingers.  Intensity in fingers and back of the arm is about the same.  When getting up off the couch recently, felt \"like burning with a torch\" in back of arm, down the arm, and into shoulder blade.   Objective Measures   Objective Measures Objective Measure 1;Objective Measure 2;Objective Measure 3;Objective Measure 4;Objective Measure 5;Objective Measure 6   Objective Measure 1   Objective Measure Cervical Flexion:   Details 5/15 = 20      6/16 = 36 \"stretching, tight pain\"      6/30 = 33   Objective Measure 2   Objective Measure Extension:   Details 5/15 = 30      6/16 = 28      6/30 = 28   Objective Measure 3   Objective Measure Sidebending Right:   Details 5/15 = 30      6/16 = 25, pain in center of neck      6/30 = 27   Objective Measure 4   Objective Measure Sidebending Left:   Details 5/15 = 25     6/16 = 28     6/30 = 27   Objective Measure 5   Objective Measure Rotation Right:   Details 5/15 = 34      6/16 = 26, 35       6/30 = 30   Objective Measure 6   Objective Measure Rotation Left:   Details 5/15 = 35      6/16 = 30, 44      6/30 = 45   Treatment Interventions (PT)   Interventions Therapeutic Procedure/Exercise;Manual Therapy   Therapeutic Procedure/Exercise   Therapeutic Procedures: strength, endurance, ROM, flexibillity minutes (73871) 34   Ther Proc 1 UBE 4 min, fwd/back self-selected pace;        Standing FF 1# 10 x 2 shoulder height;     Standing ABD-not completed due to increased discomfort between shoulder blades at last session;    Standing IR 3 kg, 15 x 2 B;   Standing ER 2kg 15 x 2;    Forward bent Horiz ABD, 1# 10 x 2 R/L;   Chest press (5) 40# 10 x 3;   triceps press 3kg 10 x 2 - increased burning in fingertips of left hand during 2nd set;   Skilled Intervention exercise instruction   Patient " Response/Progress Continued exercise limitation due to reported pain between shoulder blades with certain motions (like rows), and increased burning in LUE.  At end of session patient states he usually feels better after therapy for the afternoon, but by that night pain returns to baseline.  Not long-lasting relief.   Plan   Home program 4/19: gentle neck stretches;    6/16: standing FF, ABD, IR, ER, trial of Forward Bent EXT as tolerated;   Total Session Time   Timed Code Treatment Minutes 34   Total Treatment Time (sum of timed and untimed services) 34   Medicare Claim Information   Medical Diagnosis s/p cervical disc replacement   PT Diagnosis impaired mobility   Start of Care Date 04/19/23   Onset date of current episode/exacerbation 12/20/21

## 2023-07-05 DIAGNOSIS — Z98.1 S/P CERVICAL SPINAL FUSION: Primary | ICD-10-CM

## 2023-07-06 ENCOUNTER — HOSPITAL ENCOUNTER (OUTPATIENT)
Dept: GENERAL RADIOLOGY | Facility: OTHER | Age: 43
Discharge: HOME | End: 2023-07-06
Attending: STUDENT IN AN ORGANIZED HEALTH CARE EDUCATION/TRAINING PROGRAM | Admitting: STUDENT IN AN ORGANIZED HEALTH CARE EDUCATION/TRAINING PROGRAM
Payer: COMMERCIAL

## 2023-07-06 ENCOUNTER — OFFICE VISIT (OUTPATIENT)
Dept: ORTHOPEDICS | Facility: OTHER | Age: 43
End: 2023-07-06
Attending: STUDENT IN AN ORGANIZED HEALTH CARE EDUCATION/TRAINING PROGRAM
Payer: OTHER MISCELLANEOUS

## 2023-07-06 DIAGNOSIS — Z98.1 S/P CERVICAL SPINAL FUSION: ICD-10-CM

## 2023-07-06 DIAGNOSIS — Z98.1 S/P CERVICAL SPINAL FUSION: Primary | ICD-10-CM

## 2023-07-06 PROCEDURE — 99212 OFFICE O/P EST SF 10 MIN: CPT | Performed by: STUDENT IN AN ORGANIZED HEALTH CARE EDUCATION/TRAINING PROGRAM

## 2023-07-06 PROCEDURE — 72040 X-RAY EXAM NECK SPINE 2-3 VW: CPT

## 2023-07-06 NOTE — PROGRESS NOTES
Patient is here for follow up on his cervical spine.  Eden Santillan LPN .....................7/6/2023 3:00 PM

## 2023-07-06 NOTE — PROGRESS NOTES
Visit Date: 2023    HISTORY OF PRESENT ILLNESS:  Florencio is a 42-year-old male who is now about 4 months status post C5-C6 disk replacement above a prior C6-C7 disk replacement for acute disk herniation. His neck pain has improved and his radicular pain has improved, but he has had some persistent burning paresthesias in the left upper extremity, which has been refractory to Lyrica.    PHYSICAL EXAMINATION:    GENERAL:  Well-appearing, well-nourished.  MUSCULOSKELETAL/NEUROLOGIC:  He has 5/5 strength in bilateral deltoid, biceps, triceps, wrist extension, flexion, hand  and hand intrinsics.  He has normal sensation to light touch throughout bilateral upper extremities.    Imaging available for review today includes AP and lateral radiographs of the cervical spine, which shows adequate positioning of his hardware and no evidence of early hardware failure, migration or heterotopic ossification.    ASSESSMENT AND PLAN:  Florencio is a 42-year-old male, 4 months status post C5-C6 anterior cervical total disk replacement above prior C6-C7 total disk replacement with some persistent burning paresthesias in the left upper extremity.  He is going to be referred to [facility] for additional pain management strategy discussions and he will follow up with me on an as-needed basis.    This clinic visit took 15 minutes.    Walt Rasheed MD        D: 2023   T: 2023   MT: [id number]    Name:     JUDY SAHNI  MRN:      [id number]        Account:    [id number]   :      1980           Visit Date: 2023     Document: [id number]

## 2023-10-27 ENCOUNTER — HOSPITAL ENCOUNTER (OUTPATIENT)
Dept: CT IMAGING | Facility: OTHER | Age: 43
Discharge: HOME | End: 2023-10-27
Admitting: FAMILY MEDICINE
Payer: OTHER MISCELLANEOUS

## 2023-10-27 DIAGNOSIS — M54.2 NECK PAIN: ICD-10-CM

## 2023-10-27 DIAGNOSIS — M54.10 RADICULOPATHY AFFECTING UPPER EXTREMITY: ICD-10-CM

## 2023-10-27 PROCEDURE — 72125 CT NECK SPINE W/O DYE: CPT

## 2023-11-08 NOTE — PROGRESS NOTES
Visit Date: 2023    HISTORY OF PRESENT ILLNESS:  Florencio is a 42-year-old male who presents 2 weeks status post C5-C6 anterior cervical total disk replacement.  He had had a herniated disk above a prior cervical disk replacement at C5-C6.  He has done very well in the early postoperative period with significant improvement in his neck and arm pain.    PHYSICAL EXAMINATION:    GENERAL:  Well-appearing, well-nourished.  MUSCULOSKELETAL/NEUROLOGIC:  He has 5/5 strength in bilateral deltoid, biceps, triceps, wrist extension, flexion, hand  and hand intrinsics.  He has normal sensation to light touch throughout bilateral upper extremities.    IMAGING:  Available for review today includes AP and lateral radiographs of the cervical spine that show adequate positioning of his hardware.  No evidence of early hardware failure, migration, or heterotopic ossification.    ASSESSMENT AND PLAN:  Florencio is a 42-year-old male who is 2 weeks status post C5-C6 anterior cervical diskectomy and disk replacement, doing well in the early postoperative period.  He will return to clinic in approximately 4 weeks for routine evaluation.  We will send him to the [facility] for physical therapy beginning any time.    Walt Rasheed MD        D: 2023   T: 2023   MT: [id number]    Name:     JUDY SAHNI  MRN:      [id number]        Account:    [id number]   :      1980           Visit Date: 2023     Document: [id number]   2014 bleeding ulcer/no

## 2023-11-09 ENCOUNTER — HOSPITAL ENCOUNTER (OUTPATIENT)
Dept: MRI IMAGING | Facility: OTHER | Age: 43
Discharge: HOME | End: 2023-11-09
Attending: PHYSICIAN ASSISTANT | Admitting: PHYSICIAN ASSISTANT
Payer: OTHER MISCELLANEOUS

## 2023-11-09 DIAGNOSIS — M54.12 CERVICAL RADICULOPATHY: ICD-10-CM

## 2023-11-09 DIAGNOSIS — M54.2 NECK PAIN: ICD-10-CM

## 2023-11-09 PROCEDURE — 72141 MRI NECK SPINE W/O DYE: CPT

## 2024-05-23 ENCOUNTER — HOSPITAL ENCOUNTER (OUTPATIENT)
Dept: GENERAL RADIOLOGY | Facility: OTHER | Age: 44
Discharge: HOME | End: 2024-05-23
Attending: ORTHOPAEDIC SURGERY | Admitting: ORTHOPAEDIC SURGERY
Payer: OTHER MISCELLANEOUS

## 2024-05-23 DIAGNOSIS — M54.12 CERVICAL RADICULOPATHY: ICD-10-CM

## 2024-05-23 PROCEDURE — 250N000009 HC RX 250: Performed by: RADIOLOGY

## 2024-05-23 PROCEDURE — 64479 NJX AA&/STRD TFRM EPI C/T 1: CPT | Mod: LT

## 2024-05-23 PROCEDURE — 255N000002 HC RX 255 OP 636: Performed by: RADIOLOGY

## 2024-05-23 RX ORDER — LIDOCAINE HYDROCHLORIDE 10 MG/ML
20 INJECTION, SOLUTION INFILTRATION; PERINEURAL ONCE
Status: COMPLETED | OUTPATIENT
Start: 2024-05-23 | End: 2024-05-23

## 2024-05-23 RX ORDER — LIDOCAINE HYDROCHLORIDE 10 MG/ML
2 INJECTION, SOLUTION EPIDURAL; INFILTRATION; INTRACAUDAL; PERINEURAL ONCE
Status: COMPLETED | OUTPATIENT
Start: 2024-05-23 | End: 2024-05-23

## 2024-05-23 RX ADMIN — LIDOCAINE HYDROCHLORIDE 1 ML: 10 INJECTION, SOLUTION INFILTRATION; PERINEURAL at 12:41

## 2024-05-23 RX ADMIN — IOHEXOL 1 ML: 240 INJECTION, SOLUTION INTRATHECAL; INTRAVASCULAR; INTRAVENOUS; ORAL at 12:40

## 2024-05-23 RX ADMIN — LIDOCAINE HYDROCHLORIDE 2 ML: 10 INJECTION, SOLUTION EPIDURAL; INFILTRATION; INTRACAUDAL; PERINEURAL at 12:41

## 2024-07-25 ENCOUNTER — HOSPITAL ENCOUNTER (OUTPATIENT)
Dept: GENERAL RADIOLOGY | Facility: OTHER | Age: 44
Discharge: HOME | End: 2024-07-25
Attending: PHYSICIAN ASSISTANT | Admitting: PHYSICIAN ASSISTANT
Payer: COMMERCIAL

## 2024-07-25 DIAGNOSIS — M54.10 RADICULOPATHY AFFECTING UPPER EXTREMITY: ICD-10-CM

## 2024-07-25 PROCEDURE — 64479 NJX AA&/STRD TFRM EPI C/T 1: CPT | Mod: LT

## 2024-07-25 PROCEDURE — 255N000002 HC RX 255 OP 636: Performed by: RADIOLOGY

## 2024-07-25 PROCEDURE — 250N000011 HC RX IP 250 OP 636: Performed by: RADIOLOGY

## 2024-07-25 PROCEDURE — 250N000009 HC RX 250: Performed by: RADIOLOGY

## 2024-07-25 RX ORDER — LIDOCAINE HYDROCHLORIDE 10 MG/ML
20 INJECTION, SOLUTION INFILTRATION; PERINEURAL ONCE
Status: COMPLETED | OUTPATIENT
Start: 2024-07-25 | End: 2024-07-25

## 2024-07-25 RX ORDER — LIDOCAINE HYDROCHLORIDE 10 MG/ML
2 INJECTION, SOLUTION EPIDURAL; INFILTRATION; INTRACAUDAL; PERINEURAL ONCE
Status: COMPLETED | OUTPATIENT
Start: 2024-07-25 | End: 2024-07-25

## 2024-07-25 RX ORDER — DEXAMETHASONE SODIUM PHOSPHATE 10 MG/ML
10 INJECTION, SOLUTION INTRAMUSCULAR; INTRAVENOUS ONCE
Status: COMPLETED | OUTPATIENT
Start: 2024-07-25 | End: 2024-07-25

## 2024-07-25 RX ADMIN — LIDOCAINE HYDROCHLORIDE 1 ML: 10 INJECTION, SOLUTION EPIDURAL; INFILTRATION; INTRACAUDAL; PERINEURAL at 10:01

## 2024-07-25 RX ADMIN — DEXAMETHASONE SODIUM PHOSPHATE 10 MG: 10 INJECTION, SOLUTION INTRAMUSCULAR; INTRAVENOUS at 10:02

## 2024-07-25 RX ADMIN — IOHEXOL 1 ML: 240 INJECTION, SOLUTION INTRATHECAL; INTRAVASCULAR; INTRAVENOUS; ORAL at 10:01

## 2024-07-25 RX ADMIN — LIDOCAINE HYDROCHLORIDE 1 ML: 10 INJECTION, SOLUTION INFILTRATION; PERINEURAL at 09:53

## 2024-10-15 ENCOUNTER — HOSPITAL ENCOUNTER (OUTPATIENT)
Dept: GENERAL RADIOLOGY | Facility: OTHER | Age: 44
Discharge: HOME | End: 2024-10-15
Attending: FAMILY MEDICINE
Payer: COMMERCIAL

## 2024-10-15 ENCOUNTER — OFFICE VISIT (OUTPATIENT)
Dept: FAMILY MEDICINE | Facility: OTHER | Age: 44
End: 2024-10-15
Attending: FAMILY MEDICINE
Payer: COMMERCIAL

## 2024-10-15 ENCOUNTER — NURSE TRIAGE (OUTPATIENT)
Dept: FAMILY MEDICINE | Facility: OTHER | Age: 44
End: 2024-10-15
Payer: COMMERCIAL

## 2024-10-15 VITALS
HEART RATE: 92 BPM | SYSTOLIC BLOOD PRESSURE: 130 MMHG | RESPIRATION RATE: 24 BRPM | HEIGHT: 71 IN | TEMPERATURE: 97.5 F | OXYGEN SATURATION: 96 % | WEIGHT: 315 LBS | DIASTOLIC BLOOD PRESSURE: 92 MMHG | BODY MASS INDEX: 44.1 KG/M2

## 2024-10-15 DIAGNOSIS — R07.9 CHEST PAIN, UNSPECIFIED TYPE: ICD-10-CM

## 2024-10-15 DIAGNOSIS — Z13.1 SCREENING FOR DIABETES MELLITUS: ICD-10-CM

## 2024-10-15 DIAGNOSIS — E66.01 MORBID OBESITY (H): ICD-10-CM

## 2024-10-15 DIAGNOSIS — R05.1 ACUTE COUGH: ICD-10-CM

## 2024-10-15 DIAGNOSIS — Z98.890 S/P CERVICAL DISC REPLACEMENT: ICD-10-CM

## 2024-10-15 DIAGNOSIS — R63.5 WEIGHT GAIN: ICD-10-CM

## 2024-10-15 DIAGNOSIS — R05.1 ACUTE COUGH: Primary | ICD-10-CM

## 2024-10-15 DIAGNOSIS — Z13.220 LIPID SCREENING: ICD-10-CM

## 2024-10-15 LAB
ATRIAL RATE - MUSE: 71 BPM
DIASTOLIC BLOOD PRESSURE - MUSE: NORMAL MMHG
INTERPRETATION ECG - MUSE: NORMAL
P AXIS - MUSE: 42 DEGREES
PR INTERVAL - MUSE: 148 MS
QRS DURATION - MUSE: 108 MS
QT - MUSE: 372 MS
QTC - MUSE: 404 MS
R AXIS - MUSE: 7 DEGREES
SYSTOLIC BLOOD PRESSURE - MUSE: NORMAL MMHG
T AXIS - MUSE: 18 DEGREES
VENTRICULAR RATE- MUSE: 71 BPM

## 2024-10-15 PROCEDURE — 71046 X-RAY EXAM CHEST 2 VIEWS: CPT

## 2024-10-15 PROCEDURE — 93010 ELECTROCARDIOGRAM REPORT: CPT | Performed by: INTERNAL MEDICINE

## 2024-10-15 PROCEDURE — G0463 HOSPITAL OUTPT CLINIC VISIT: HCPCS | Mod: 25

## 2024-10-15 PROCEDURE — 93005 ELECTROCARDIOGRAM TRACING: CPT | Performed by: FAMILY MEDICINE

## 2024-10-15 PROCEDURE — 99215 OFFICE O/P EST HI 40 MIN: CPT | Performed by: FAMILY MEDICINE

## 2024-10-15 RX ORDER — METHOCARBAMOL 750 MG/1
750 TABLET, FILM COATED ORAL EVERY 6 HOURS PRN
Qty: 90 TABLET | Refills: 2 | Status: SHIPPED | OUTPATIENT
Start: 2024-10-15 | End: 2024-11-06

## 2024-10-15 RX ORDER — OXYCODONE HYDROCHLORIDE 5 MG/1
5 TABLET ORAL EVERY 6 HOURS PRN
Qty: 20 TABLET | Refills: 0 | Status: SHIPPED | OUTPATIENT
Start: 2024-10-15 | End: 2024-11-06

## 2024-10-15 RX ORDER — OXYCODONE HYDROCHLORIDE 5 MG/1
5 TABLET ORAL EVERY 6 HOURS PRN
COMMUNITY
Start: 2024-09-04 | End: 2024-10-15

## 2024-10-15 SDOH — HEALTH STABILITY: PHYSICAL HEALTH: ON AVERAGE, HOW MANY DAYS PER WEEK DO YOU ENGAGE IN MODERATE TO STRENUOUS EXERCISE (LIKE A BRISK WALK)?: 0 DAYS

## 2024-10-15 ASSESSMENT — ANXIETY QUESTIONNAIRES
GAD7 TOTAL SCORE: 7
GAD7 TOTAL SCORE: 7
6. BECOMING EASILY ANNOYED OR IRRITABLE: MORE THAN HALF THE DAYS
8. IF YOU CHECKED OFF ANY PROBLEMS, HOW DIFFICULT HAVE THESE MADE IT FOR YOU TO DO YOUR WORK, TAKE CARE OF THINGS AT HOME, OR GET ALONG WITH OTHER PEOPLE?: VERY DIFFICULT
IF YOU CHECKED OFF ANY PROBLEMS ON THIS QUESTIONNAIRE, HOW DIFFICULT HAVE THESE PROBLEMS MADE IT FOR YOU TO DO YOUR WORK, TAKE CARE OF THINGS AT HOME, OR GET ALONG WITH OTHER PEOPLE: VERY DIFFICULT
7. FEELING AFRAID AS IF SOMETHING AWFUL MIGHT HAPPEN: NOT AT ALL
4. TROUBLE RELAXING: SEVERAL DAYS
1. FEELING NERVOUS, ANXIOUS, OR ON EDGE: SEVERAL DAYS
7. FEELING AFRAID AS IF SOMETHING AWFUL MIGHT HAPPEN: NOT AT ALL
GAD7 TOTAL SCORE: 7
5. BEING SO RESTLESS THAT IT IS HARD TO SIT STILL: SEVERAL DAYS
3. WORRYING TOO MUCH ABOUT DIFFERENT THINGS: SEVERAL DAYS
2. NOT BEING ABLE TO STOP OR CONTROL WORRYING: SEVERAL DAYS

## 2024-10-15 ASSESSMENT — PATIENT HEALTH QUESTIONNAIRE - PHQ9
SUM OF ALL RESPONSES TO PHQ QUESTIONS 1-9: 8
SUM OF ALL RESPONSES TO PHQ QUESTIONS 1-9: 8
10. IF YOU CHECKED OFF ANY PROBLEMS, HOW DIFFICULT HAVE THESE PROBLEMS MADE IT FOR YOU TO DO YOUR WORK, TAKE CARE OF THINGS AT HOME, OR GET ALONG WITH OTHER PEOPLE: VERY DIFFICULT

## 2024-10-15 ASSESSMENT — ENCOUNTER SYMPTOMS: COUGH: 1

## 2024-10-15 ASSESSMENT — PAIN SCALES - GENERAL: PAINLEVEL: EXTREME PAIN (9)

## 2024-10-15 ASSESSMENT — SOCIAL DETERMINANTS OF HEALTH (SDOH): HOW OFTEN DO YOU GET TOGETHER WITH FRIENDS OR RELATIVES?: NEVER

## 2024-10-15 NOTE — TELEPHONE ENCOUNTER
"Patient states he would like to rule out aspiration pneumonia and for his appointment to be an annual physical to complete routine lab work. Asia Finney RN on 10/15/2024 at 8:46 AM      Additional Information   Negative: Followed an injury to chest   Negative: SEVERE chest pain   Negative: Pain also in shoulder(s) or arm(s) or jaw   Negative: Difficulty breathing   Negative: Cocaine use within last 3 days   Negative: Major surgery in the past month   Negative: Hip or leg fracture (broken bone) in past month (or had cast on leg or ankle in past month)   Negative: Illness requiring prolonged bedrest in past month (e.g., immobilization, long hospital stay)   Negative: Long-distance travel in past month (e.g., car, bus, train, plane; with trip lasting 6 or more hours)   Negative: History of prior 'blood clot' in leg or lungs (i.e., deep vein thrombosis, pulmonary embolism)   Negative: History of inherited increased risk of blood clots (e.g., Factor 5 Leiden, Anti-thrombin 3, Protein C or Protein S deficiency, Prothrombin mutation)   Negative: Cancer treatment in the past two months (or has cancer now)   Negative: Heart beating irregularly or very rapidly   Negative: Chest pain lasting longer than 5 minutes and occurred in last 3 days (72 hours) (Exception: Feels exactly the same as previously diagnosed heartburn and has accompanying sour taste in mouth.)   Negative: Chest pain or 'angina' comes and goes and is happening more often (increasing in frequency) or getting worse (increasing in severity) (Exception: Chest pains that last only a few seconds.)   Negative: Dizziness or lightheadedness   Negative: Coughing up blood   Negative: Patient sounds very sick or weak to the triager    Answer Assessment - Initial Assessment Questions  1. LOCATION: \"Where does it hurt?\"        Chest pain     2. RADIATION: \"Does the pain go anywhere else?\" (e.g., into neck, jaw, arms, back)      Denies     3. ONSET: \"When did the chest " "pain begin?\" (Minutes, hours or days)       1 week ago     4. PATTERN: \"Does the pain come and go, or has it been constant since it started?\"  \"Does it get worse with exertion?\"       Only with coughing     5. DURATION: \"How long does it last\" (e.g., seconds, minutes, hours)      A couple of minutes depending on the coughing     6. SEVERITY: \"How bad is the pain?\"  (e.g., Scale 1-10; mild, moderate, or severe)     - MILD (1-3): doesn't interfere with normal activities      - MODERATE (4-7): interferes with normal activities or awakens from sleep     - SEVERE (8-10): excruciating pain, unable to do any normal activities        Moderate     7. CARDIAC RISK FACTORS: \"Do you have any history of heart problems or risk factors for heart disease?\" (e.g., angina, prior heart attack; diabetes, high blood pressure, high cholesterol, smoker, or strong family history of heart disease)      Yes     8. PULMONARY RISK FACTORS: \"Do you have any history of lung disease?\"  (e.g., blood clots in lung, asthma, emphysema, birth control pills)      Denies     9. CAUSE: \"What do you think is causing the chest pain?\"      Patient states he thinks aspirated last Sunday night     10. OTHER SYMPTOMS: \"Do you have any other symptoms?\" (e.g., dizziness, nausea, vomiting, sweating, fever, difficulty breathing, cough)        Denies     11. PREGNANCY: \"Is there any chance you are pregnant?\" \"When was your last menstrual period?\"        No    Protocols used: Chest Pain-A-[place]    "

## 2024-10-15 NOTE — NURSING NOTE
"Chief Complaint   Patient presents with    Physical     Annual exam    Cough     Productive, congestion, chest pressure/pain, rib pain        Initial BP (!) 148/94 (BP Location: Right arm, Patient Position: Sitting, Cuff Size: Adult Large)   Pulse 92   Temp 97.5  F (36.4  C) (Tympanic)   Resp 24   Ht 1.81 m (5' 11.25\")   Wt (!) 161 kg (355 lb)   SpO2 96%   BMI 49.17 kg/m   Estimated body mass index is 49.17 kg/m  as calculated from the following:    Height as of this encounter: 1.81 m (5' 11.25\").    Weight as of this encounter: 161 kg (355 lb).  Medication Review: complete    The next two questions are to help us understand your food security.  If you are feeling you need any assistance in this area, we have resources available to support you today.           No data to display                  Health Care Directive:  Patient does not have a Health Care Directive or Living Will: Discussed advance care planning with patient; however, patient declined at this time.    Morena Costa, EZE      "

## 2024-10-15 NOTE — PROGRESS NOTES
Preventive Care Visit  [facility] [place]  Estrada Richardson MD, Family Medicine  Oct 15, 2024      Assessment & Plan     Acute cough  Likely viral URI, less likely pneumonia or aspiration pneumonia per his concern.  Go ahead with chest x-ray today.  We will treat any abnormalities as indicated.  Continue symptomatic care with Mucinex DM and follow-up if worsening or not resolving in the next 5 to 7 days.  - XR Chest 2 Views; Future    S/P cervical disc replacement  Status post cervical spine surgeries x 2 as below with subsequent chronic neck pain.  Following with [facility] and being transitioned to [place] pain clinic.  Refilled the methocarbamol and then oxycodone #20 for sparing use.  He will continue following with the specialists per their recommendations.  - methocarbamol (ROBAXIN) 750 MG tablet; Take 1 tablet (750 mg) by mouth every 6 hours as needed for muscle spasms.  - oxyCODONE (ROXICODONE) 5 MG tablet; Take 1 tablet (5 mg) by mouth every 6 hours as needed for pain.    Chest pain, unspecified type  Increased chest pain episodes along with the acute illness, but high risk for cardiac etiology with the family history, morbid obesity, and previous smoking.  Blood pressure is also elevated today, which could be pain related and illness related.  Go ahead with stress echocardiogram in about 3 to 4 weeks to make sure he is resolved with the acute illness.  If anything worsens or changes in the meantime, he will let me know.  Go ahead with the EKG today along with the chest x-ray.  EKG fully normal per my review.  Also monitor blood pressures at home since those were mildly elevated today.  Will have him follow-up then in about 6 weeks to see how the blood pressures are doing and review the stress test.  He and wife are okay with the plan.  - EKG 12-lead, tracing only (Same Day)  - Echo Stress Echocardiogram; Future    Screening for diabetes mellitus  Check labs in about 2 weeks fasting  "when he hopefully feels better from the acute illness.  - Basic metabolic panel; Future  - Hemoglobin A1c; Future    Lipid screening  Check lipid panel in 2 weeks.  - Lipid Profile; Future    Weight gain  Check TSH with the blood work in 2 weeks in association with morbid obesity.  - TSH Reflex GH; Future    Morbid obesity (H)  Healthy diet and regular exercise as tolerated to try and improve the previous weight gain after the neck surgeries.            BMI  Estimated body mass index is 49.17 kg/m  as calculated from the following:    Height as of this encounter: 1.81 m (5' 11.25\").    Weight as of this encounter: 161 kg (355 lb).       Counseling  Appropriate preventive services were addressed with this patient via screening, questionnaire, or discussion as appropriate for fall prevention, nutrition, physical activity, Tobacco-use cessation, social engagement, weight loss and cognition.  Checklist reviewing preventive services available has been given to the patient.  Reviewed patient's diet, addressing concerns and/or questions.   Patient is at risk for social isolation and has been provided with information about the benefit of social connection.   The patient was instructed to see the dentist every 6 months.   The patient's PHQ-9 score is consistent with mild depression. He was provided with information regarding depression.           Return in about 6 weeks (around 11/26/2024) for Follow up, with me.    Christy Matias is a 44 year old, presenting for the following:  Physical (Annual exam) and Cough (Productive, congestion, chest pressure/pain, rib pain )         Health Care Directive  Patient does not have a Health Care Directive or Living Will:     Cough      44-year-old male here with wife for several complaints.  He had not been in with primary care for quite a few years and so needed to establish care.  He originally set this appointment up as a physical, but he really declines preventative physical " examination today.  He was mainly concerned about acute illness with cough along with chest pain episodes and then chronic neck pain.  First, he has had cough for 10 days.  He noticed this first when he woke up 2 Saturdays ago and seemed to choke on some acid, which se can get from time to time with underlying GERD.  He wondered if he actually aspirated as he progressed into some chest tightness, cramping, and cough through that day.  He did have rhinorrhea, nasal congestion for 3 days, but that resolved.  The cough really just has continued.  It has not worsened, but just has not gone away.  It is productive occasionally of clear sputum.  Not really any dyspnea or wheezing at all.  No fevers.  He had been previous smoker but quit about 3 years ago.  No underlying lung disease history.  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  However, upon records review he has not had imaging for 3 years.  Last chest x-ray was December 2021.  He also did have CTA chest at that time as well, which were normal.  He wanted to make sure he did not have pneumonia.  He did have COVID tests at home negative x 2.  No sick contacts.  His wife and son have not been ill.    He also has chest pain episodes, which have been going on longer than just the illness.  They  have been worse with the acute illness.  He describes these as left-sided crampy pain with some tightness at times, mild to moderate in severity.  These can last for a few seconds to a few minutes.  They are not exertional, but he has been limited with any kind of activity or exercise for quite a while due to the chronic neck pain and neck surgeries that he has had.  He will get some dyspnea with that, but no diaphoresis.  No radiation of the pain into the arms.  He does feel like he gets some cramps in the mid back as well during these episodes so  not sure if it is wrapping around or shooting through the chest to the back.  He does not really take anything for that.  He does have significant family history of heart disease with paternal uncle who just had myocardial infarction.  His father had pacemaker and there has been other paternal uncles with heart disease.  He felt like he was high risk for that and wanted to get that evaluated, especially with his other risk factors of obesity and previous smoking.    He also has chronic neck pain with cervical surgeries x 2 most recently 2/28/2023 over in [place].  This actually had been initially a work comp injury from 3 years ago.  He has been following with [facility] due to work comp requirements, but is in the process of changing to [place] pain clinic.  He currently takes methocarbamol 4 times a day with pretty good results, but still takes an occasional oxycodone 5 mg.  He is still awaiting the referral and the appointment for [place] pain clinic.  They had talked about a potential third surgery, but he really has not wished for that.  He otherwise seems to be doing well.  Other than the above, he states that he really had previously been quite healthy and pretty fit with regular exercise.  He has been limited on that for the last 3 years due to the work comp injury and the neck pain.  He states he has not really needed to come into the doctor much for other medical issues previously.    Past Medical History:   Diagnosis Date    Adhesive capsulitis of left shoulder     11/29/2016    Esophageal reflux 06/16/2010    Gastro-esophageal reflux disease without esophagitis     No Comments Provided    Morbid obesity (H) 12/28/2021    Nonspecific reaction to tuberculin skin test without active tuberculosis     2009,negative chest x-ray. Completed isoniazid.    Other shoulder lesions, left shoulder     11/28/2016    S/P cervical disc replacement 05/26/2023     Current Outpatient Medications   Medication Sig  Dispense Refill    ibuprofen (ADVIL/MOTRIN) 200 MG tablet Take 800 mg by mouth every 6 hours as needed      methocarbamol (ROBAXIN) 750 MG tablet Take 1 tablet (750 mg) by mouth every 6 hours as needed for muscle spasms. 90 tablet 2    oxyCODONE (ROXICODONE) 5 MG tablet Take 1 tablet (5 mg) by mouth every 6 hours as needed for pain. 20 tablet 0       PDMP Review         Value Time User    State PDMP site checked  Yes 10/16/2024  8:47 AM Estrada Richardson MD                    10/15/2024   General Health   How would you rate your overall physical health? (!) POOR   Feel stress (tense, anxious, or unable to sleep) To some extent            10/15/2024   Nutrition   Three or more servings of calcium each day? Yes   Diet: Regular (no restrictions)   How many servings of fruit and vegetables per day? (!) 0-1   How many sweetened beverages each day? (!) 2            10/15/2024   Exercise   Days per week of moderate/strenous exercise 0 days            10/15/2024   Social Factors   Frequency of gathering with friends or relatives Never            10/15/2024   Dental   Dentist two times every year? (!) NO             Today's PHQ-9 Score:       10/15/2024     9:56 AM   PHQ-9 SCORE   PHQ-9 Total Score MyChart 8 (Mild depression)   PHQ-9 Total Score 8         10/15/2024   Substance Use   Alcohol more than 3/day or more than 7/wk No   Do you use any other substances recreationally? (!) CANNABIS PRODUCTS        Social History     Tobacco Use    Smoking status: Former     Current packs/day: 0.00     Types: Cigarettes     Quit date: 10/23/2021     Years since quittin.9    Smokeless tobacco: Current     Types: Chew   Vaping Use    Vaping status: Never Used   Substance Use Topics    Alcohol use: Not Currently     Alcohol/week: 0.0 standard drinks of alcohol     Comment: rare    Drug use: Yes     Types: Marijuana       ASCVD Risk   The ASCVD Risk score (Papo SIERRA, et al., 2019) failed to calculate for the following  "reasons:    Cannot find a previous HDL lab    Cannot find a previous total cholesterol lab        10/15/2024   Contraception/Family Planning   Questions about contraception or family planning No           Reviewed and updated as needed this visit by Provider   Tobacco  Allergies  Meds  Problems  Med Hx  Surg Hx  Fam Hx            ROS: All other systems reviewed and are negative except as above.             Objective    Exam  BP (!) 130/92   Pulse 92   Temp 97.5  F (36.4  C) (Tympanic)   Resp 24   Ht 1.81 m (5' 11.25\")   Wt (!) 161 kg (355 lb)   SpO2 96%   BMI 49.17 kg/m     Estimated body mass index is 49.17 kg/m  as calculated from the following:    Height as of this encounter: 1.81 m (5' 11.25\").    Weight as of this encounter: 161 kg (355 lb).    Physical Exam  GENERAL: alert and no distress, not ill-appearing.  Mild cough in office.  No acute distress.  EYES: Eyes grossly normal to inspection, PERRL and conjunctivae and sclerae normal  HENT: ear canals and TM's normal, nose and mouth without ulcers or lesions  NECK: no adenopathy, no asymmetry, masses, or scars  RESP: lungs clear to auscultation - no rales, rhonchi or wheezes  CV: regular rate and rhythm, normal S1 S2, no S3 or S4, no murmur, click or rub, no peripheral edema         Signed Electronically by: Estrada Richardson MD  Answers submitted by the patient for this visit:  Patient Health Questionnaire (Submitted on 10/15/2024)  If you checked off any problems, how difficult have these problems made it for you to do your work, take care of things at home, or get along with other people?: Very difficult  PHQ9 TOTAL SCORE: 8  Patient Health Questionnaire (G7) (Submitted on 10/15/2024)  FRANCISCO J 7 TOTAL SCORE: 7    Margin code: Total face-to-face time along with records review and documentation 53 minutes.  "

## 2024-10-18 ENCOUNTER — LAB (OUTPATIENT)
Dept: LAB | Facility: OTHER | Age: 44
End: 2024-10-18
Attending: FAMILY MEDICINE
Payer: COMMERCIAL

## 2024-10-18 DIAGNOSIS — Z13.1 SCREENING FOR DIABETES MELLITUS: ICD-10-CM

## 2024-10-18 DIAGNOSIS — Z13.220 LIPID SCREENING: ICD-10-CM

## 2024-10-18 DIAGNOSIS — R63.5 WEIGHT GAIN: ICD-10-CM

## 2024-10-18 LAB
ANION GAP SERPL CALCULATED.3IONS-SCNC: 10 MMOL/L (ref 7–15)
BUN SERPL-MCNC: 11.4 MG/DL (ref 6–20)
CALCIUM SERPL-MCNC: 9.6 MG/DL (ref 8.8–10.4)
CHLORIDE SERPL-SCNC: 101 MMOL/L (ref 98–107)
CHOLEST SERPL-MCNC: 184 MG/DL
CREAT SERPL-MCNC: 1.13 MG/DL (ref 0.67–1.17)
EGFRCR SERPLBLD CKD-EPI 2021: 82 ML/MIN/1.73M2
EST. AVERAGE GLUCOSE BLD GHB EST-MCNC: 128 MG/DL
FASTING STATUS PATIENT QL REPORTED: YES
FASTING STATUS PATIENT QL REPORTED: YES
GLUCOSE SERPL-MCNC: 128 MG/DL (ref 70–99)
HBA1C MFR BLD: 6.1 %
HCO3 SERPL-SCNC: 26 MMOL/L (ref 22–29)
HDLC SERPL-MCNC: 37 MG/DL
LDLC SERPL CALC-MCNC: 93 MG/DL
NONHDLC SERPL-MCNC: 147 MG/DL
POTASSIUM SERPL-SCNC: 4.3 MMOL/L (ref 3.4–5.3)
SODIUM SERPL-SCNC: 137 MMOL/L (ref 135–145)
TRIGL SERPL-MCNC: 270 MG/DL
TSH SERPL DL<=0.005 MIU/L-ACNC: 3.82 UIU/ML (ref 0.3–4.2)

## 2024-10-18 PROCEDURE — 83036 HEMOGLOBIN GLYCOSYLATED A1C: CPT | Mod: ZL

## 2024-10-18 PROCEDURE — 84443 ASSAY THYROID STIM HORMONE: CPT | Mod: ZL

## 2024-10-18 PROCEDURE — 82310 ASSAY OF CALCIUM: CPT | Mod: ZL

## 2024-10-18 PROCEDURE — 80061 LIPID PANEL: CPT | Mod: ZL

## 2024-10-18 PROCEDURE — 36415 COLL VENOUS BLD VENIPUNCTURE: CPT | Mod: ZL

## 2024-10-18 PROCEDURE — 80048 BASIC METABOLIC PNL TOTAL CA: CPT | Mod: ZL

## 2024-11-06 DIAGNOSIS — Z98.890 S/P CERVICAL DISC REPLACEMENT: ICD-10-CM

## 2024-11-06 RX ORDER — OXYCODONE HYDROCHLORIDE 5 MG/1
5 TABLET ORAL EVERY 6 HOURS PRN
Qty: 20 TABLET | Refills: 0 | Status: SHIPPED | OUTPATIENT
Start: 2024-11-06

## 2024-11-06 RX ORDER — METHOCARBAMOL 750 MG/1
750 TABLET, FILM COATED ORAL EVERY 6 HOURS PRN
Qty: 90 TABLET | Refills: 2 | Status: SHIPPED | OUTPATIENT
Start: 2024-11-06

## 2024-11-06 NOTE — TELEPHONE ENCOUNTER
Patient called stating he will run out of his pain medications in the next day. He called [facility] around 2 PM, after he left he pain clinic in [place] and writer doesn't see any requests. Writer will route to Dr. Richardson and a covering provider for tomorrow, if not addressed today yet by Dr. Richardson.     Last prescriptions:  10/15/24 Sent (none) Estrada Richardson MD [facility]     oxyCODONE (ROXICODONE) 5 MG tablet 20 tablet 0 10/15/2024 -- No   Sig - Route: Take 1 tablet (5 mg) by mouth every 6 hours as needed for pain. - Oral     methocarbamol (ROBAXIN) 750 MG tablet 90 tablet 2 10/15/2024 -- No   Sig - Route: Take 1 tablet (750 mg) by mouth every 6 hours as needed for muscle spasms. - Oral     [id number] - [place], [place] - [address]     Per 10/15/24 OV note:  S/P cervical disc replacement  Status post cervical spine surgeries x 2 as below with subsequent chronic neck pain.  Following with [facility] and being transitioned to [place] pain clinic.  Refilled the methocarbamol and then oxycodone #20 for sparing use.  He will continue following with the specialists per their recommendations.  - methocarbamol (ROBAXIN) 750 MG tablet; Take 1 tablet (750 mg) by mouth every 6 hours as needed for muscle spasms.  - oxyCODONE (ROXICODONE) 5 MG tablet; Take 1 tablet (5 mg) by mouth every 6 hours as needed for pain.    Return in about 6 weeks (around 11/26/2024) for Follow up, with me.     Please call Pt with status.    Unable to complete prescription refill per RN Medication Refill Policy. Morena Davila RN .............. 11/6/2024  4:48 PM

## 2024-11-08 RX ORDER — OXYCODONE HYDROCHLORIDE 5 MG/1
5 TABLET ORAL EVERY 6 HOURS PRN
Qty: 20 TABLET | Refills: 0 | OUTPATIENT
Start: 2024-11-08

## 2024-11-08 NOTE — TELEPHONE ENCOUNTER
[facility] sent Rx request for the following:      Requested Prescriptions   Pending Prescriptions Disp Refills    oxyCODONE (ROXICODONE) 5 MG tablet [Pharmacy Med Name: OXYCODONE 5MG TABLET] 20 tablet 0     Sig: TAKE 1 TABLET (5 MG) BY MOUTH EVERY 6 HOURS AS NEEDED FOR PAIN.       There is no refill protocol information for this order          Last Prescription Date:   11/6/24  Last Fill Qty/Refills:         20, R-0    Last Office Visit:              10/15/24 Dr. Richardson    Per Chart Review:  Pharmacy has refill order.  Order declined    oxyCODONE (ROXICODONE) 5 MG tablet 20 tablet 0 11/6/2024 -- No   Sig - Route: Take 1 tablet (5 mg) by mouth every 6 hours as needed for pain. - Oral   Sent to pharmacy as: oxyCODONE HCl 5 MG Oral Tablet (ROXICODONE)   Class: E-Prescribe   Earliest Fill Date: 11/6/2024   Order: [id number]   E-Prescribing Status: Receipt confirmed by pharmacy (11/6/2024 11:08 PM CST)                 Azalea Nevarez RN on 11/8/2024 at 3:35 PM

## 2024-11-11 ENCOUNTER — TELEPHONE (OUTPATIENT)
Dept: CARDIOLOGY | Facility: OTHER | Age: 44
End: 2024-11-11
Payer: COMMERCIAL

## 2024-11-11 NOTE — TELEPHONE ENCOUNTER
Left message re:stress testing instructions  Left call back number for questions or concerns([phone])

## 2024-11-13 ENCOUNTER — TELEPHONE (OUTPATIENT)
Dept: CARDIOLOGY | Facility: OTHER | Age: 44
End: 2024-11-13
Payer: COMMERCIAL

## 2024-11-13 NOTE — TELEPHONE ENCOUNTER
Patient verified .  Reminder call for stress test with instructions given.  Patient verbalized understanding.    Emphasis on wearing adequate shoe wear for biking  Patient states he understands

## 2024-11-14 ENCOUNTER — HOSPITAL ENCOUNTER (OUTPATIENT)
Dept: CARDIOLOGY | Facility: OTHER | Age: 44
Discharge: HOME | End: 2024-11-14
Attending: FAMILY MEDICINE
Payer: COMMERCIAL

## 2024-11-14 DIAGNOSIS — R07.9 CHEST PAIN, UNSPECIFIED TYPE: ICD-10-CM

## 2024-11-14 PROCEDURE — 999N000208 ECHO STRESS ECHOCARDIOGRAM

## 2024-11-14 PROCEDURE — 255N000002 HC RX 255 OP 636: Performed by: FAMILY MEDICINE

## 2024-11-14 PROCEDURE — 93017 CV STRESS TEST TRACING ONLY: CPT

## 2024-11-14 RX ADMIN — PERFLUTREN 4 ML: 6.52 INJECTION, SUSPENSION INTRAVENOUS at 14:05

## 2024-11-14 NOTE — PROGRESS NOTES
1250 The patient arrived for a stress echo.  The procedure, risks and benefits were discussed and the consent was signed.  The patient was prepped for the stress test, and an IV was placed per procedure.  The echo sonographer did the initial images with definity for image enhancement.   Patient biked 18 minutes and 37 seconds.  The patient unable to reach target heart rate of 148.  Stress images were completed and the IV was removed per procedure.  The patient was released in stable condition.  The patient was instructed that the ordering MD will call with results in one to two days.  If you have not received your results within 3 days please call the ordering provider.  Please see the chart for complete test results.       Rema Paulino RN on 11/14/2024 at 2:11 PM

## 2024-11-22 DIAGNOSIS — Z98.890 S/P CERVICAL DISC REPLACEMENT: ICD-10-CM

## 2024-11-25 RX ORDER — OXYCODONE HYDROCHLORIDE 5 MG/1
5 TABLET ORAL EVERY 6 HOURS PRN
Qty: 20 TABLET | Refills: 0 | Status: SHIPPED | OUTPATIENT
Start: 2024-11-25

## 2024-11-25 NOTE — TELEPHONE ENCOUNTER
[id number] of [place] sent Rx request for the following:      Requested Prescriptions   Pending Prescriptions Disp Refills    oxyCODONE (ROXICODONE) 5 MG tablet [Pharmacy Med Name: OXYCODONE 5MG TABLET] 20 tablet 0     Sig: TAKE 1 TABLET (5 MG) BY MOUTH EVERY 6 HOURS AS NEEDED FOR PAIN.       There is no refill protocol information for this order        Last Prescription Date:   11/6/24  Last Fill Qty/Refills:         20, R-0    S/P cervical disc replacement [Z98.890]        Last Office Visit:              10/15/24   Future Office visit:           None    Per LOV note:  S/P cervical disc replacement  Status post cervical spine surgeries x 2 as below with subsequent chronic neck pain.  Following with [facility] and being transitioned to [place] pain clinic.  Refilled the methocarbamol and then oxycodone #20 for sparing use.  He will continue following with the specialists per their recommendations.    Unable to complete prescription refill per RN Medication Refill Policy. Morena Davila RN .............. 11/25/2024  11:36 AM

## 2024-12-11 DIAGNOSIS — Z98.890 S/P CERVICAL DISC REPLACEMENT: ICD-10-CM

## 2024-12-11 RX ORDER — OXYCODONE HYDROCHLORIDE 5 MG/1
5 TABLET ORAL EVERY 6 HOURS PRN
Qty: 20 TABLET | Refills: 0 | Status: SHIPPED | OUTPATIENT
Start: 2024-12-11

## 2024-12-11 NOTE — TELEPHONE ENCOUNTER
[id number] of [place] sent Rx request for the following:      Requested Prescriptions   Pending Prescriptions Disp Refills    oxyCODONE (ROXICODONE) 5 MG tablet [Pharmacy Med Name: OXYCODONE 5MG TABLET] 20 tablet 0     Sig: TAKE 1 TABLET (5 MG) BY MOUTH EVERY 6 HOURS AS NEEDED FOR PAIN.       There is no refill protocol information for this order        Last Prescription Date:   11/25/24  Last Fill Qty/Refills:         20, R-0    Last Office Visit:              10/15/24   Future Office visit:           None     Per LOV note:  S/P cervical disc replacement  Status post cervical spine surgeries x 2 as below with subsequent chronic neck pain.  Following with [facility] and being transitioned to [place] [facility].  Refilled the methocarbamol and then oxycodone #20 for sparing use.  He will continue following with the specialists per their recommendations.     Unable to complete prescription refill per RN Medication Refill Policy. Morena Davila RN .............. 11/25/2024  11:36 AM

## 2024-12-23 DIAGNOSIS — Z98.890 S/P CERVICAL DISC REPLACEMENT: ICD-10-CM

## 2024-12-26 NOTE — TELEPHONE ENCOUNTER
OXYCODONE 5MG TABLET         Last Written Prescription Date:  12/11/24  Last Fill Quantity: 20,   # refills: 0  Last Office Visit: 10/15/24  Future Office visit:       Routing refill request to provider for review/approval because:  Drug not on the [facility] P or [facility] refill protocol or controlled substance. Will forward to provider for  consideration.  Unable to complete prescription refill per RNMedication Refill Policy.................... Ciarra Alcaraz RN ....................  12/26/2024   5:09 PM

## 2024-12-30 DIAGNOSIS — Z98.890 S/P CERVICAL DISC REPLACEMENT: ICD-10-CM

## 2024-12-30 RX ORDER — OXYCODONE HYDROCHLORIDE 5 MG/1
5 TABLET ORAL EVERY 6 HOURS PRN
Qty: 20 TABLET | Refills: 0 | Status: SHIPPED | OUTPATIENT
Start: 2024-12-30

## 2025-01-03 NOTE — TELEPHONE ENCOUNTER
[id number] sent Rx request for the following:      Requested Prescriptions   Pending Prescriptions Disp Refills    oxyCODONE (ROXICODONE) 5 MG tablet [Pharmacy Med Name: OXYCODONE 5MG TABLET] 20 tablet 0     Sig: TAKE 1 TABLET (5 MG) BY MOUTH EVERY 6 HOURS AS NEEDED FOR PAIN.       There is no refill protocol information for this order        Last Prescription Date:   12/30/24  Last Fill Qty/Refills:         20, R-0  3  S/P cervical disc replacement [Z98.890]        Last Office Visit:              10/15/24   Future Office visit:           None    Per LOV note:  Return in about 6 weeks (around 11/26/2024) for Follow up, with me.     Pt due for follow up. Routing to provider for refill consideration. Routing to Unit scheduling pool, to assist Pt in scheduling appointment. Unable to complete prescription refill per RN Medication Refill Policy. Morena Davila RN .............. 1/3/2025  3:50 PM

## 2025-01-06 RX ORDER — OXYCODONE HYDROCHLORIDE 5 MG/1
5 TABLET ORAL EVERY 6 HOURS PRN
Qty: 20 TABLET | Refills: 0 | Status: SHIPPED | OUTPATIENT
Start: 2025-01-06

## 2025-01-23 ENCOUNTER — OFFICE VISIT (OUTPATIENT)
Dept: FAMILY MEDICINE | Facility: OTHER | Age: 45
End: 2025-01-23
Attending: FAMILY MEDICINE
Payer: OTHER MISCELLANEOUS

## 2025-01-23 VITALS
SYSTOLIC BLOOD PRESSURE: 144 MMHG | RESPIRATION RATE: 22 BRPM | HEART RATE: 84 BPM | TEMPERATURE: 97.2 F | DIASTOLIC BLOOD PRESSURE: 80 MMHG

## 2025-01-23 DIAGNOSIS — Z98.890 S/P CERVICAL DISC REPLACEMENT: ICD-10-CM

## 2025-01-23 DIAGNOSIS — M54.12 CERVICAL RADICULOPATHY: Primary | ICD-10-CM

## 2025-01-23 RX ORDER — OXYCODONE HYDROCHLORIDE 5 MG/1
5 TABLET ORAL EVERY 6 HOURS PRN
Qty: 60 TABLET | Refills: 0 | Status: SHIPPED | OUTPATIENT
Start: 2025-01-23

## 2025-01-23 RX ORDER — DULOXETIN HYDROCHLORIDE 20 MG/1
20 CAPSULE, DELAYED RELEASE ORAL 2 TIMES DAILY
Qty: 60 CAPSULE | Refills: 1 | Status: SHIPPED | OUTPATIENT
Start: 2025-01-23

## 2025-01-23 RX ORDER — METHOCARBAMOL 750 MG/1
750 TABLET, FILM COATED ORAL 3 TIMES DAILY
Qty: 90 TABLET | Refills: 11 | Status: SHIPPED | OUTPATIENT
Start: 2025-01-23

## 2025-01-23 ASSESSMENT — PATIENT HEALTH QUESTIONNAIRE - PHQ9
10. IF YOU CHECKED OFF ANY PROBLEMS, HOW DIFFICULT HAVE THESE PROBLEMS MADE IT FOR YOU TO DO YOUR WORK, TAKE CARE OF THINGS AT HOME, OR GET ALONG WITH OTHER PEOPLE: VERY DIFFICULT
SUM OF ALL RESPONSES TO PHQ QUESTIONS 1-9: 14
SUM OF ALL RESPONSES TO PHQ QUESTIONS 1-9: 14

## 2025-01-23 ASSESSMENT — PAIN SCALES - GENERAL: PAINLEVEL_OUTOF10: SEVERE PAIN (7)

## 2025-01-23 NOTE — LETTER

## 2025-01-23 NOTE — NURSING NOTE
"Patient presents to the clinic for work comp.    Chief Complaint   Patient presents with    Work Comp     Ongoing neck pain       Initial BP (!) 144/80 (BP Location: Right arm, Patient Position: Sitting, Cuff Size: Adult Large)   Pulse 84   Temp 97.2  F (36.2  C) (Tympanic)   Resp 22  Estimated body mass index is 49.17 kg/m  as calculated from the following:    Height as of 10/15/24: 1.81 m (5' 11.25\").    Weight as of 10/15/24: 161 kg (355 lb).  Medication Reconciliation: complete        Vee Lamb LPN    "

## 2025-01-23 NOTE — PROGRESS NOTES
"  Assessment & Plan     S/P cervical disc replacement  Chronic neck pain with surgeries x 2 previously with left upper extremity radiculopathy.  Continue to follow along with the pain clinic.  Agreed to go ahead with below regimen with oxycodone currently 2 tabs twice daily still ideally for short term use.  He understands that discussion.  Continue Robaxin at 3 pills a day.  Also discussed option of Cymbalta as an alternative to gabapentin in the chronic pain realm of management.  Discussed potential side effects and he is agreeable to go ahead with that 20 mg daily for 2 weeks, then twice daily after that.  He will continue to work through his [facility] and the [place] pain clinic for ongoing management with potential nerve stimulator.  - oxyCODONE (ROXICODONE) 5 MG tablet; Take 1 tablet (5 mg) by mouth every 6 hours as needed for pain. 4 tabs a day.  - methocarbamol (ROBAXIN) 750 MG tablet; Take 1 tablet (750 mg) by mouth 3 times daily.  - DULoxetine (CYMBALTA) 20 MG capsule; Take 1 capsule (20 mg) by mouth 2 times daily.    Cervical radiculopathy  As above.  - DULoxetine (CYMBALTA) 20 MG capsule; Take 1 capsule (20 mg) by mouth 2 times daily.          BMI  Estimated body mass index is 49.17 kg/m  as calculated from the following:    Height as of 10/15/24: 1.81 m (5' 11.25\").    Weight as of 10/15/24: 161 kg (355 lb).       Depression Screening Follow Up        1/23/2025     7:19 AM   PHQ   PHQ-9 Total Score 14    Q9: Thoughts of better off dead/self-harm past 2 weeks Not at all       Patient-reported           Follow Up Actions Taken  Patient counseled, no additional follow up at this time.           Return in about 6 weeks (around 3/6/2025) for Follow up, with me.    Christy Matias is a 44 year old, presenting for the following health issues:  Work Comp (Ongoing neck pain)        1/23/2025     7:42 AM   Additional Questions   Roomed by ayanna parsons lpn     History of Present Illness       Reason for visit:  Neck " pain  Symptom onset:  More than a month  Symptoms include:  Burn tingle numb pench pressure  Symptom intensity:  Severe  Symptom progression:  Worsening  Had these symptoms before:  No  What makes it worse:  Extending and wrong possition  What makes it better:  Not much   He is taking medications regularly.     44-year-old male here for follow-up of chronic neck pain.  This is posterior and anterior neck pain, sharp, constant, moderately severe.  He has had the 2 previous surgeries with disc replacement surgery in [place] without success, most recently 2 years ago.  He now has seen [place] pain clinic with last visit in November.  Patient states that they had been concerned about some nerve damage in the area around the clavicle area and he does get a lot of pain in the left upper chest and clavicle area as well.  He feels like pain affects his ADLs significantly.  He does not sleep well.  He cannot do any house cleaning.  He states he even has a difficult time going to the bathroom not able to wipe himself properly because of the chronic pain.  His current pain regimen is oxycodone 5 mg 2 tabs p.o. twice daily, Robaxin-750 milligrams 1 tab in the morning and then 1-2 tabs at night.  He had been on gabapentin before, but that caused side effects of weird dreams and thoughts couple years ago.  They had recommended a third surgery per the neurosurgeon, but he did not agree to that since the first two had not been successful.  They are in the works of working through a spinal cord stimulator, but they are working through the work comp issues with that.  He states that he is frustrated as they are possibly trying to settle through work comp and then maybe would have a difficult time getting coverage for potential spinal cord stimulator.  He had some questions regarding that.  He does get some burning pain all the way down the left arm into the base of the thumb and index and middle fingers.  Does not really get radicular  symptoms into the right arm.  No other new complaints.    I have reviewed the patient's medical history in detail and updated the computerized patient record.    PDMP Review         Value Time User    State PDMP site checked  Yes 1/23/2025  2:54 PM Estrada Richardson MD                            Objective    BP (!) 144/80 (BP Location: Right arm, Patient Position: Sitting, Cuff Size: Adult Large)   Pulse 84   Temp 97.2  F (36.2  C) (Tympanic)   Resp 22   There is no height or weight on file to calculate BMI.  Physical Exam   GENERAL: alert and no distress  MS: no gross musculoskeletal defects noted, neck with tenderness midline diffusely as well as lower paracervical spinal muscles bilaterally and at the trapezius muscles.  Muscle strength on  strength and flexion extension against resistance at the elbow normal bilaterally.  DTRs 0+ biceps, triceps, brachial radialis bilaterally.  Range of motion limited to 30 degrees lateral rotation bilaterally, 30 degrees flexion, 15 degrees extension.            Signed Electronically by: Estrada Richardson MD

## 2025-02-12 DIAGNOSIS — Z98.890 S/P CERVICAL DISC REPLACEMENT: ICD-10-CM

## 2025-02-12 RX ORDER — OXYCODONE HYDROCHLORIDE 5 MG/1
5 TABLET ORAL EVERY 6 HOURS PRN
Qty: 60 TABLET | Refills: 0 | Status: SHIPPED | OUTPATIENT
Start: 2025-02-12

## 2025-02-12 NOTE — TELEPHONE ENCOUNTER
OXYCODONE 5MG TABLET        Last Written Prescription Date:  1/23/25  Last Fill Quantity: 60,   # refills: 0  Last Office Visit: 1/23/25  Future Office visit:    Next 5 appointments (look out 90 days)      Mar 06, 2025 8:40 AM  (Arrive by 8:25 AM)  Provider Visit with Estrada Richardson MD  [facility] ([facility]) [address]  [place] [address]  [phone]             Routing refill request to provider for review/approval because:  Drug not on the [facility], [facility] or [facility] refill protocol or controlled substance.  Will forward to provider for consideration. Unable to complete prescription refill per RNMedication Refill Policy.................... Ciarra Alcaraz RN ....................  2/12/2025   2:04 PM

## 2025-03-04 DIAGNOSIS — Z98.890 S/P CERVICAL DISC REPLACEMENT: ICD-10-CM

## 2025-03-05 RX ORDER — OXYCODONE HYDROCHLORIDE 5 MG/1
5 TABLET ORAL EVERY 6 HOURS PRN
Qty: 60 TABLET | Refills: 0 | Status: SHIPPED | OUTPATIENT
Start: 2025-03-05

## 2025-03-05 NOTE — TELEPHONE ENCOUNTER
OXYCODONE 5MG TABLET         Last Written Prescription Date:  2/12/25  Last Fill Quantity: 60,   # refills: 0  Last Office Visit: 1/23/25  Future Office visit:       Routing refill request to provider for review/approval because:  Drug not on the [facility], P or [facility] refill protocol or controlled substance. Unable to complete prescription refill per RNMedication Refill Policy.................... Ciarra Alcaraz RN ....................  3/5/2025   7:09 AM

## 2025-03-26 DIAGNOSIS — Z98.890 S/P CERVICAL DISC REPLACEMENT: ICD-10-CM

## 2025-03-26 RX ORDER — OXYCODONE HYDROCHLORIDE 5 MG/1
5 TABLET ORAL EVERY 6 HOURS PRN
Qty: 60 TABLET | Refills: 0 | Status: SHIPPED | OUTPATIENT
Start: 2025-03-26

## 2025-03-26 NOTE — TELEPHONE ENCOUNTER
[id number] sent Rx request for the following:      Requested Prescriptions   Pending Prescriptions Disp Refills    oxyCODONE (ROXICODONE) 5 MG tablet [Pharmacy Med Name: OXYCODONE 5MG TABLET] 60 tablet 0     Sig: TAKE 1 TABLET (5 MG) BY MOUTH EVERY 6 HOURS AS NEEDED FOR SEVERE PAIN.       Rx Protocol Controlled Substance Failed - 3/26/2025  3:16 PM        Failed - Urine drug screeen results on file in past 12 months     [unfilled]           Failed - Medication not refilled in past 28 days     Invalid Medication Grouper          Failed - Pain Agreement on file in last 12 months     Please review last Controlled Substance Pain agreement document.   CSA -- Encounter Level:    CSA: None found at the encounter level.       CSA -- Patient Level:    CSA: None found at the patient level.               Failed - Auto Fail - Please forward to Provider        Failed - Naloxone on active med list        Failed - FRANCISCO J-7 done in past year     Please review last FRANCISCO J-7 score.       4/25/2018     9:01 AM 12/28/2021     9:45 AM 10/15/2024     9:58 AM   FRANCISCO J-7 SCORE   Total Score  3 (minimal anxiety) 7 (mild anxiety)   Total Score 0 3 7          S/P cervical disc replacement [Z98.890]        Last Prescription Date:   3/5/25  Last Fill Qty/Refills:         60, R-0    Last Office Visit:              1/23/25   Future Office visit:            None    Per LOV note:  Return in about 6 weeks (around 3/6/2025) for Follow up, with me.     Unable to complete prescription refill per RN Medication Refill Policy.     Cedrick Garcia RN on 3/26/2025 at 3:17 PM

## 2025-04-10 DIAGNOSIS — Z98.890 S/P CERVICAL DISC REPLACEMENT: ICD-10-CM

## 2025-04-14 RX ORDER — OXYCODONE HYDROCHLORIDE 5 MG/1
5 TABLET ORAL EVERY 6 HOURS PRN
Qty: 30 TABLET | Refills: 0 | Status: SHIPPED | OUTPATIENT
Start: 2025-04-14

## 2025-04-14 NOTE — TELEPHONE ENCOUNTER
30 tabs only approved this time.  He really needs a follow-up appointment.  This should only be a shorter term duration of the oxycodone and ideally should not be long-term for him.  He basically is taking 4 tabs a day, which is not what I would recommend for him long term.  He should make a follow-up appointment to discuss other options for long-term treatment or potentially back to spinal specialist or pain clinic for management.

## 2025-04-14 NOTE — TELEPHONE ENCOUNTER
Requested Prescriptions   Pending Prescriptions Disp Refills    oxyCODONE (ROXICODONE) 5 MG tablet [Pharmacy Med Name: OXYCODONE 5MG TABLET] 60 tablet 0     Sig: TAKE 1 TABLET (5 MG) BY MOUTH EVERY 6 HOURS AS NEEDED FOR SEVERE PAIN.       Rx Protocol Controlled Substance Failed - 4/14/2025  4:24 PM        Failed - Urine drug screeen results on file in past 12 months     [unfilled]           Failed - Medication not refilled in past 28 days     Invalid Medication Grouper          Failed - Controlled Substance Agreement on file in last 12 months     Please review last Controlled Substance Pain agreement document.   CSA -- Encounter Level:    CSA: None found at the encounter level.       CSA -- Patient Level:    CSA: None found at the patient level.               Failed - Auto Fail - Please forward to Provider        Failed - Naloxone on active med list        Failed - FRANCISCO J-7 done in past year     Please review last FRANCISCO J-7 score.       4/25/2018     9:01 AM 12/28/2021     9:45 AM 10/15/2024     9:58 AM   FRANCISCO J-7 SCORE   Total Score  3 (minimal anxiety) 7 (mild anxiety)   Total Score 0 3 7             Passed - Visit with relevant provider in past 3 months or upcoming 3 months        Passed - Medication is active on med list and the sig matches        Passed - No Benzodiazepines on acive med list        Passed - PHQ9 done in past year     Please review last PHQ-9 score.       12/28/2021     9:45 AM 10/15/2024     9:56 AM 1/23/2025     7:19 AM   PHQ-9 SCORE   PHQ-9 Total Score MyChart 3 (Minimal depression) 8 (Mild depression) 14 (Moderate depression)   PHQ-9 Total Score 3 8 14        Patient-reported                    Last Prescription Date:   3/26/2025  Last Fill Qty/Refills:         60, R-0     LOV: 1/23/2025  Future Office visit:   No future appointment scheduled at this time.      Overdue          Never done YEARLY PREVENTIVE VISIT (Yearly)     Never done COLORECTAL CANCER SCREENING (View topic details)     Never  done HIV SCREENING (Once)     Never done HEPATITIS C SCREENING (Once)     Never done HEPATITIS B IMMUNIZATION (1 of 3 - 19+ 3-dose series)     SEP 3  2003 DTAP/TDAP/TD IMMUNIZATION (2 - Tdap)  Last completed: Sep 2, 2003     Never done INFLUENZA VACCINE (1)     Never done COVID-19 Vaccine (1 - 2024-25 season)       Routing refill request to provider for review/approval.    Gladis Burleson RN  ....................  4/14/2025   4:24 PM

## 2025-04-15 NOTE — TELEPHONE ENCOUNTER
Pt states he only has 2 pills left, he is wondering when this will be sent to the pharmacy. Please advise.     Belinda Dean on 4/15/2025 at 8:02 AM

## 2025-04-15 NOTE — TELEPHONE ENCOUNTER
Called and spoke to Patient after verifying last name and date of birth. Pt informed of provider response. Pt transferred to scheduling line, to set up an appointment. Morena Davila RN .............. 4/15/2025  8:22 AM

## 2025-04-24 DIAGNOSIS — Z98.890 S/P CERVICAL DISC REPLACEMENT: ICD-10-CM

## 2025-04-28 RX ORDER — OXYCODONE HYDROCHLORIDE 5 MG/1
5 TABLET ORAL EVERY 6 HOURS PRN
Qty: 30 TABLET | Refills: 0 | Status: SHIPPED | OUTPATIENT
Start: 2025-04-28

## 2025-04-28 NOTE — TELEPHONE ENCOUNTER
[id number] sent Rx request for the following:      Requested Prescriptions   Pending Prescriptions Disp Refills    oxyCODONE (ROXICODONE) 5 MG tablet [Pharmacy Med Name: OXYCODONE 5MG TABLET] 30 tablet 0     Sig: TAKE 1 TABLET (5 MG) BY MOUTH EVERY 6 HOURS AS NEEDED FOR SEVERE PAIN.       Rx Protocol Controlled Substance Failed - 4/28/2025 11:31 AM        Failed - Urine drug screeen results on file in past 12 months     [unfilled]         Failed - Medication not refilled in past 28 days     Invalid Medication Grouper        Failed - Controlled Substance Agreement on file in last 12 months     Please review last Controlled Substance Pain agreement document.   CSA -- Encounter Level:    CSA: None found at the encounter level.       CSA -- Patient Level:    CSA: None found at the patient level.             Failed - Auto Fail - Please forward to Provider        Failed - Naloxone on active med list        Failed - FRANCISCO J-7 done in past year     Please review last FRANCISCO J-7 score.       4/25/2018     9:01 AM 12/28/2021     9:45 AM 10/15/2024     9:58 AM   FRANCISCO J-7 SCORE   Total Score  3 (minimal anxiety) 7 (mild anxiety)   Total Score 0 3 7        Last Prescription Date:   4/14/25  Last Fill Qty/Refills:         30, R-0    Last Office Visit:              1/23/25  Future Office visit:           Tomorrow    Per LOV note:    Return in about 6 weeks (around 3/6/2025) for Follow up, with me.    Appointment tomorrow.    Unable to complete prescription refill per RN Medication Refill Policy. Morena Davila, Refill RN .............. 4/28/2025  1:25 PM

## 2025-04-29 ENCOUNTER — OFFICE VISIT (OUTPATIENT)
Dept: FAMILY MEDICINE | Facility: OTHER | Age: 45
End: 2025-04-29
Attending: FAMILY MEDICINE
Payer: COMMERCIAL

## 2025-04-29 VITALS
RESPIRATION RATE: 18 BRPM | TEMPERATURE: 97.5 F | HEART RATE: 70 BPM | OXYGEN SATURATION: 96 % | WEIGHT: 315 LBS | HEIGHT: 71 IN | BODY MASS INDEX: 44.1 KG/M2 | SYSTOLIC BLOOD PRESSURE: 148 MMHG | DIASTOLIC BLOOD PRESSURE: 100 MMHG

## 2025-04-29 DIAGNOSIS — M54.12 CERVICAL RADICULOPATHY: Primary | ICD-10-CM

## 2025-04-29 DIAGNOSIS — Z98.890 S/P CERVICAL DISC REPLACEMENT: ICD-10-CM

## 2025-04-29 PROCEDURE — G0463 HOSPITAL OUTPT CLINIC VISIT: HCPCS

## 2025-04-29 RX ORDER — DULOXETIN HYDROCHLORIDE 30 MG/1
30 CAPSULE, DELAYED RELEASE ORAL 2 TIMES DAILY
Qty: 60 CAPSULE | Refills: 2 | Status: SHIPPED | OUTPATIENT
Start: 2025-04-29

## 2025-04-29 RX ORDER — OXYCODONE HYDROCHLORIDE 5 MG/1
5 TABLET ORAL EVERY 6 HOURS PRN
Qty: 60 TABLET | Refills: 0 | Status: SHIPPED | OUTPATIENT
Start: 2025-05-06

## 2025-04-29 ASSESSMENT — ANXIETY QUESTIONNAIRES
8. IF YOU CHECKED OFF ANY PROBLEMS, HOW DIFFICULT HAVE THESE MADE IT FOR YOU TO DO YOUR WORK, TAKE CARE OF THINGS AT HOME, OR GET ALONG WITH OTHER PEOPLE?: VERY DIFFICULT
GAD7 TOTAL SCORE: 10
5. BEING SO RESTLESS THAT IT IS HARD TO SIT STILL: MORE THAN HALF THE DAYS
IF YOU CHECKED OFF ANY PROBLEMS ON THIS QUESTIONNAIRE, HOW DIFFICULT HAVE THESE PROBLEMS MADE IT FOR YOU TO DO YOUR WORK, TAKE CARE OF THINGS AT HOME, OR GET ALONG WITH OTHER PEOPLE: VERY DIFFICULT
7. FEELING AFRAID AS IF SOMETHING AWFUL MIGHT HAPPEN: NOT AT ALL
2. NOT BEING ABLE TO STOP OR CONTROL WORRYING: SEVERAL DAYS
4. TROUBLE RELAXING: MORE THAN HALF THE DAYS
7. FEELING AFRAID AS IF SOMETHING AWFUL MIGHT HAPPEN: NOT AT ALL
1. FEELING NERVOUS, ANXIOUS, OR ON EDGE: SEVERAL DAYS
GAD7 TOTAL SCORE: 10
3. WORRYING TOO MUCH ABOUT DIFFERENT THINGS: MORE THAN HALF THE DAYS
GAD7 TOTAL SCORE: 10
6. BECOMING EASILY ANNOYED OR IRRITABLE: MORE THAN HALF THE DAYS

## 2025-04-29 ASSESSMENT — PATIENT HEALTH QUESTIONNAIRE - PHQ9
10. IF YOU CHECKED OFF ANY PROBLEMS, HOW DIFFICULT HAVE THESE PROBLEMS MADE IT FOR YOU TO DO YOUR WORK, TAKE CARE OF THINGS AT HOME, OR GET ALONG WITH OTHER PEOPLE: VERY DIFFICULT
SUM OF ALL RESPONSES TO PHQ QUESTIONS 1-9: 10
SUM OF ALL RESPONSES TO PHQ QUESTIONS 1-9: 10

## 2025-04-29 ASSESSMENT — PAIN SCALES - GENERAL: PAINLEVEL_OUTOF10: SEVERE PAIN (7)

## 2025-04-29 NOTE — NURSING NOTE
"Chief Complaint   Patient presents with    Recheck Medication       Initial BP (!) 148/100   Pulse 70   Temp 97.5  F (36.4  C) (Tympanic)   Resp 18   Ht 1.81 m (5' 11.25\")   Wt (!) 161.9 kg (357 lb)   SpO2 96%   BMI 49.44 kg/m   Estimated body mass index is 49.44 kg/m  as calculated from the following:    Height as of this encounter: 1.81 m (5' 11.25\").    Weight as of this encounter: 161.9 kg (357 lb).  Medication Review: complete    The next two questions are to help us understand your food security.  If you are feeling you need any assistance in this area, we have resources available to support you today.          10/15/2024   SDOH- Food Insecurity   Within the past 12 months, did you worry that your food would run out before you got money to buy more? N   Within the past 12 months, did the food you bought just not last and you didn t have money to get more? N         Health Care Directive:  Patient does not have a Health Care Directive: Discussed advance care planning with patient; however, patient declined at this time.    Jennifer Nolasco LPN      "

## 2025-04-29 NOTE — PROGRESS NOTES
"  Assessment & Plan     Cervical radiculopathy  Ongoing significant symptomatology with partial relief with the Cymbalta and oxycodone at 4 tabs per day.  Continue to follow along with the pain specialist and spine surgeon and follow their recommendations for further treatment.  Discussed okay for the oxycodone at present doses for now and that could change over time depending on relief with the interventions recommended by the specialist.  Since he has had some relief with the Cymbalta 20 mg twice daily, increase that to 30 mg twice daily with the next prescription in a few weeks.  He will let me know if any problems with that dose increase.  Follow-up with me in 2 months for recheck, sooner if any problems.  - DULoxetine (CYMBALTA) 30 MG capsule; Take 1 capsule (30 mg) by mouth 2 times daily.  - oxyCODONE (ROXICODONE) 5 MG tablet; Take 1 tablet (5 mg) by mouth every 6 hours as needed for pain.    S/P cervical disc replacement  As above.  - DULoxetine (CYMBALTA) 30 MG capsule; Take 1 capsule (30 mg) by mouth 2 times daily.          BMI  Estimated body mass index is 49.44 kg/m  as calculated from the following:    Height as of this encounter: 1.81 m (5' 11.25\").    Weight as of this encounter: 161.9 kg (357 lb).       Depression Screening Follow Up        4/29/2025     3:28 PM   PHQ   PHQ-9 Total Score 10    Q9: Thoughts of better off dead/self-harm past 2 weeks Not at all       Patient-reported           Follow Up Actions Taken  Patient counseled, no additional follow up at this time.           Christy Matias is a 44 year old, presenting for the following health issues:  Recheck Medication        4/29/2025     3:36 PM   Additional Questions   Roomed by Jennifer Nolasco LPN     History of Present Illness       Reason for visit:  On going neck pain from work injury    He eats 2-3 servings of fruits and vegetables daily.He consumes 0 sweetened beverage(s) daily.He exercises with enough effort to increase his heart " "rate 30 to 60 minutes per day.  He exercises with enough effort to increase his heart rate 5 days per week.   He is taking medications regularly.      44-year-old male here for follow-up of chronic neck pain with cervical radiculopathy.  He has the moderately severe constant neck pain with radiation of symptoms into the left upper extremity.  It has not really changed significantly, possibly a little worse gradually over time.  He has more of a sharp burning stabbing pain coming into the left posterior arm all the way down to the fingers.  First 3 fingers are numb and has burning pain with that as well.  He is continuing to follow along with pain specialist out of [place] and then spinal surgeon in [place].  They apparently are talking potentially doing something with a potential thoracic outlet syndrome issue, but he was unsure of full details on that.  We do not have the [place] surgical information available on Care Everywhere.  I do see the pain specialist records with Trudy Acharya through [facility] with telemedicine appointment just a couple weeks ago on 4/8/2025.  He does have possibly have a scan follow-up at the end of this week for further recommendations.      He does get some significant relief with the oxycodone 5 mg at 4 tabs a day.  He still takes methocarbamol, which helps a little bit.  We had also started Cymbalta low-dose 20 mg twice daily back in January.  He feels like that has helped a little bit with some of the nerve pain.  He feels like irritability and \"crabbiness\" is slightly improved since being on the Cymbalta.  No side effects to that.  No other complaints.  He did have an upper respiratory infection last week, but improved at this time.     I have reviewed the patient's medical history in detail and updated the computerized patient record.      Pain History:  When did you first notice your pain? 3 years ago   Have you seen this provider for your pain in the past? Yes   Where " "in your body do you have pain? Neck pain radiating to left arm and into left hand  Are you seeing anyone else for your pain? Yes - specialist for pain        10/15/2024     9:56 AM 1/23/2025     7:19 AM 4/29/2025     3:28 PM   PHQ-9 SCORE   PHQ-9 Total Score MyChart 8 (Mild depression) 14 (Moderate depression) 10 (Moderate depression)   PHQ-9 Total Score 8 14  10        Patient-reported           12/28/2021     9:45 AM 10/15/2024     9:58 AM 4/29/2025     3:30 PM   FRANCISCO J-7 SCORE   Total Score 3 (minimal anxiety) 7 (mild anxiety) 10 (moderate anxiety)   Total Score 3 7 10        Patient-reported               Chronic Pain Follow Up:    Location of pain: neck radiating to left arm into left hand  Analgesia/pain control:    - Recent changes:  no    - Overall control: Tolerable with discomfort    - Current treatments: as above   Adherence:     - Do you ever take more pain medicine than prescribed? No    - When did you take your last dose of pain medicine?  This morning at 745 am   Adverse effects: No   PDMP Review         Value Time User    State PDMP site checked  Yes 4/29/2025  4:30 PM Estrada Richardson MD          Last CSA Agreement:   CSA -- Patient Level:    CSA: None found at the patient level.       Last UDS:                         Objective    BP (!) 148/100   Pulse 70   Temp 97.5  F (36.4  C) (Tympanic)   Resp 18   Ht 1.81 m (5' 11.25\")   Wt (!) 161.9 kg (357 lb)   SpO2 96%   BMI 49.44 kg/m    Body mass index is 49.44 kg/m .  Physical Exam   GENERAL: alert and no distress  Neck: Range of motion limited to 30 degrees lateral rotation, flexion to 15 degrees, extension to 30 degrees; muscle strength mildly decreased on  strength and flexion at the elbow against resistance compared to the right.            Signed Electronically by: Estrada Richardson MD    "

## 2025-06-11 DIAGNOSIS — M54.12 CERVICAL RADICULOPATHY: ICD-10-CM

## 2025-06-11 RX ORDER — OXYCODONE HYDROCHLORIDE 5 MG/1
5 TABLET ORAL EVERY 6 HOURS PRN
Qty: 60 TABLET | Refills: 0 | Status: SHIPPED | OUTPATIENT
Start: 2025-06-11

## 2025-06-11 NOTE — TELEPHONE ENCOUNTER
[facility] sent Rx request for the following:      Requested Prescriptions   Pending Prescriptions Disp Refills    oxyCODONE (ROXICODONE) 5 MG tablet [Pharmacy Med Name: OXYCODONE 5MG TABLET] 60 tablet 0     Sig: TAKE 1 TABLET (5 MG) BY MOUTH EVERY 6 HOURS AS NEEDED FOR SEVERE PAIN.       Rx Protocol Controlled Substance Failed - 6/11/2025 10:51 AM     Last Prescription Date:   05/23/2025  Last Fill Qty/Refills:         60, R-0    Last Office Visit:              04/29/2025   Future Office visit:           None  Unable to complete prescription refill per RN Medication Refill Policy.    Jannie Constantino RN on 6/11/2025 at 10:53 AM

## 2025-06-30 ENCOUNTER — OFFICE VISIT (OUTPATIENT)
Dept: FAMILY MEDICINE | Facility: OTHER | Age: 45
End: 2025-06-30
Attending: FAMILY MEDICINE
Payer: OTHER MISCELLANEOUS

## 2025-06-30 VITALS
DIASTOLIC BLOOD PRESSURE: 85 MMHG | RESPIRATION RATE: 16 BRPM | SYSTOLIC BLOOD PRESSURE: 135 MMHG | OXYGEN SATURATION: 96 % | BODY MASS INDEX: 48.61 KG/M2 | HEART RATE: 73 BPM | TEMPERATURE: 97.9 F | WEIGHT: 315 LBS

## 2025-06-30 DIAGNOSIS — Z98.890 S/P CERVICAL DISC REPLACEMENT: ICD-10-CM

## 2025-06-30 DIAGNOSIS — M54.12 CERVICAL RADICULOPATHY: Primary | ICD-10-CM

## 2025-06-30 PROCEDURE — 99214 OFFICE O/P EST MOD 30 MIN: CPT | Performed by: FAMILY MEDICINE

## 2025-06-30 RX ORDER — OXYCODONE HYDROCHLORIDE 5 MG/1
5 TABLET ORAL EVERY 6 HOURS PRN
Qty: 60 TABLET | Refills: 0 | Status: SHIPPED | OUTPATIENT
Start: 2025-06-30

## 2025-06-30 RX ORDER — DULOXETIN HYDROCHLORIDE 30 MG/1
30 CAPSULE, DELAYED RELEASE ORAL 2 TIMES DAILY
Qty: 180 CAPSULE | Refills: 3 | Status: SHIPPED | OUTPATIENT
Start: 2025-06-30

## 2025-06-30 ASSESSMENT — PAIN SCALES - GENERAL: PAINLEVEL_OUTOF10: SEVERE PAIN (9)

## 2025-06-30 NOTE — PROGRESS NOTES
"  Assessment & Plan     Cervical radiculopathy  Stable at this point on 4 oxycodone per day along with Cymbalta.  He will continue to work with the pain specialist with upcoming pain stimulator placement.  Follow-up here in 3 months for recheck, sooner if any problems.  Discussed that we would need to go ahead with controlled substance agreement protocol if this ends up being a long-term treatment depending on improvement with the pain stimulator.  He understands discussion.  - oxyCODONE (ROXICODONE) 5 MG tablet; Take 1 tablet (5 mg) by mouth every 6 hours as needed for severe pain.    S/P cervical disc replacement  As above.          BMI  Estimated body mass index is 48.61 kg/m  as calculated from the following:    Height as of 4/29/25: 1.81 m (5' 11.25\").    Weight as of this encounter: 159.2 kg (351 lb).             Christy Matias is a 44 year old, presenting for the following health issues:  Recheck Medication (Needs refills) and Work Comp (Follow up on neck injury)        6/30/2025    12:50 PM   Additional Questions   Roomed by Myrna     History of Present Illness       Back Pain:  He presents for follow up of back pain. Patient's back pain is a chronic problem.  Location of back pain:  Left side of neck and left shoulder  Description of back pain: burning, cramping, shooting and stabbing  Back pain spreads: left side of neck    Since patient first noticed back pain, pain is: gradually worsening  Does back pain interfere with his job:  Yes       Headaches:   Since the patient's last clinic visit, headaches are: worsened  The patient is getting headaches:  All the time  He is not able to do normal daily activities when he has a migraine.  The patient is taking the following rescue/relief medications:  Ibuprofen (Advil, Motrin)   Patient states \"I get only a small amount of relief\" from the rescue/relief medications.   The patient is taking the following medications to prevent migraines:  Other  In the past " 4 weeks, the patient has gone to an Urgent Care or Emergency Room 0 times times due to headaches.    He eats 2-3 servings of fruits and vegetables daily.He consumes 0 sweetened beverage(s) daily.He exercises with enough effort to increase his heart rate 20 to 29 minutes per day.  He exercises with enough effort to increase his heart rate 6 days per week.   He is taking medications regularly.      44-year-old male here for ongoing follow-up for chronic neck pain status post cervical disc replacement.  He continues to follow long with the pain specialist.  They are planning potentially for pain stimulator placement in the near future.  He was not sure when exactly that was going to be.  He just saw Dr. Acharya last week so that is approved by insurance, but yet to be scheduled.  He has not had any significant change in his pain status with details as above.  Range of motion is still limited with severe neck pain and ongoing bilateral upper extremity radicular symptoms.  He still has the numbness and tingling into the left hand worse with certain positions and relieved by putting arm up with stretches.  He has the thoracic outlet syndrome diagnosed, but he has not wished to pursue surgical treatment as they had talked about potential first rib removal.  He still is taking 4 tabs of oxycodone per day, ibuprofen, along with Cymbalta.  That had been increased up from 20 mg twice daily to 30 mg twice daily 2 months ago.  That has not made any significant improvement.  Pain continues to be constant, moderately severe with radiation especially in the left upper extremity.  No other complaints.    I have reviewed the patient's medical history in detail and updated the computerized patient record.    PDMP Review         Value Time User    State PDMP site checked  Yes 6/30/2025  1:13 PM Estrada Richardson MD                          Objective    /85 (BP Location: Right arm, Patient Position: Sitting, Cuff Size: Adult Large)    Pulse 73   Temp 97.9  F (36.6  C) (Tympanic)   Resp 16   Wt (!) 159.2 kg (351 lb)   SpO2 96%   BMI 48.61 kg/m    Body mass index is 48.61 kg/m .  Physical Exam   GENERAL: alert and no distress  Neck: Tenderness diffusely lower paracervical spinal muscles bilaterally.  Range of motion limited to 45 degrees lateral rotation bilaterally, 45 degrees extension, 30 degrees flexion.            Signed Electronically by: Estrada Richardson MD

## 2025-06-30 NOTE — NURSING NOTE
"Chief Complaint   Patient presents with    Recheck Medication     Needs refills    Work Comp     Follow up on neck injury       Initial /85 (BP Location: Right arm, Patient Position: Sitting, Cuff Size: Adult Large)   Pulse 73   Temp 97.9  F (36.6  C) (Tympanic)   Resp 16   Wt (!) 159.2 kg (351 lb)   SpO2 96%   BMI 48.61 kg/m   Estimated body mass index is 48.61 kg/m  as calculated from the following:    Height as of 4/29/25: 1.81 m (5' 11.25\").    Weight as of this encounter: 159.2 kg (351 lb).  Medication Reconciliation: complete        Myrna Jenkins CMA   "

## 2025-07-11 ENCOUNTER — TELEPHONE (OUTPATIENT)
Dept: FAMILY MEDICINE | Facility: OTHER | Age: 45
End: 2025-07-11
Payer: COMMERCIAL

## 2025-07-11 NOTE — TELEPHONE ENCOUNTER
Pt called again stating that the pharmacy will not fill them saying he should not be out of meds yet. Pt stated he is out after today and he is taking them like the doctor said. Pt is wanting a call back. Thank you  Neris Troncoso on 7/11/2025 at 10:42 AM

## 2025-07-28 DIAGNOSIS — M54.12 CERVICAL RADICULOPATHY: ICD-10-CM

## 2025-07-29 ENCOUNTER — PATIENT OUTREACH (OUTPATIENT)
Dept: CARE COORDINATION | Facility: CLINIC | Age: 45
End: 2025-07-29
Payer: COMMERCIAL

## 2025-07-29 RX ORDER — OXYCODONE HYDROCHLORIDE 5 MG/1
5 TABLET ORAL EVERY 6 HOURS PRN
Qty: 60 TABLET | Refills: 0 | Status: SHIPPED | OUTPATIENT
Start: 2025-07-29

## 2025-07-29 NOTE — TELEPHONE ENCOUNTER
Requested Prescriptions   Pending Prescriptions Disp Refills    oxyCODONE (ROXICODONE) 5 MG tablet [Pharmacy Med Name: OXYCODONE 5MG TABLET] 60 tablet 0     Sig: TAKE 1 TABLET (5 MG) BY MOUTH EVERY 6 HOURS AS NEEDED FOR SEVERE PAIN.       Rx Protocol Controlled Substance Failed - 7/29/2025 10:44 AM        Failed - Urine drug screeen results on file in past 12 months     [unfilled]         Failed - Medication not refilled in past 28 days     Invalid Medication Grouper        Failed - Controlled Substance Agreement on file in last 12 months     Please review last Controlled Substance Pain agreement document.   CSA -- Encounter Level:    CSA: None found at the encounter level.       CSA -- Patient Level:    CSA: None found at the patient level.             Failed - Auto Fail - Please forward to Provider        Failed - Naloxone on active med list        Failed - FRANCISCO J-7 done in past year     Please review last FRANCISCO J-7 score.       12/28/2021     9:45 AM 10/15/2024     9:58 AM 4/29/2025     3:30 PM   FRANCISCO J-7 SCORE   Total Score 3 (minimal anxiety) 7 (mild anxiety) 10 (moderate anxiety)   Total Score 3 7 10        Patient-reported        Last Prescription Date:   7/11/25  Last Fill Qty/Refills:         60, R-0    Cervical radiculopathy [M54.12]        Last Office Visit:              6/30/25   Future Office visit:             Next 5 appointments (look out 90 days)      Sep 30, 2025 10:45 AM  (Arrive by 10:30 AM)  Provider Visit with Estrada Richardson MD  [facility] [address]  [place] [id number] [phone]     Per LOV note:  Cervical radiculopathy  Stable at this point on 4 oxycodone per day along with Cymbalta.  He will continue to work with the pain specialist with upcoming pain stimulator placement.  Follow-up here in 3 months for recheck, sooner if any problems.     Unable to complete prescription refill per RN Medication  Refill Policy. Fam Morales RN .............. 7/29/2025  10:45 AM

## 2025-07-29 NOTE — TELEPHONE ENCOUNTER
Reason for call: Medication or medication refill    Have you contacted your pharmacy regarding this refill? Yes    If No, direct the patient to contact the Pharmacy and discontinue telephone note using Erroneous Encounter.  If Yes, continue.    Name of medication requested: Oxycodone    How many days of medication do you have left? 0    What pharmacy do you use? [facility]    Preferred method for responding to this message: Telephone Call    Phone number patient can be reached at: Cell number on file:    Telephone Information:   Mobile [phone]       If we cannot reach you directly, may we leave a detailed response at the number you provided? Yes    Ritu Schwartz on 7/29/2025 at 10:42 AM

## 2025-08-06 DIAGNOSIS — M54.12 CERVICAL RADICULOPATHY: ICD-10-CM

## 2025-08-11 RX ORDER — OXYCODONE HYDROCHLORIDE 5 MG/1
5 TABLET ORAL EVERY 6 HOURS PRN
Qty: 60 TABLET | Refills: 0 | Status: SHIPPED | OUTPATIENT
Start: 2025-08-11

## 2025-08-29 DIAGNOSIS — M54.12 CERVICAL RADICULOPATHY: ICD-10-CM

## 2025-09-03 ENCOUNTER — OFFICE VISIT (OUTPATIENT)
Dept: FAMILY MEDICINE | Facility: OTHER | Age: 45
End: 2025-09-03
Attending: FAMILY MEDICINE
Payer: OTHER MISCELLANEOUS

## 2025-09-03 VITALS
SYSTOLIC BLOOD PRESSURE: 138 MMHG | RESPIRATION RATE: 24 BRPM | BODY MASS INDEX: 47.85 KG/M2 | WEIGHT: 315 LBS | TEMPERATURE: 97.7 F | HEART RATE: 71 BPM | DIASTOLIC BLOOD PRESSURE: 88 MMHG | OXYGEN SATURATION: 96 %

## 2025-09-03 DIAGNOSIS — M54.12 CERVICAL RADICULOPATHY: ICD-10-CM

## 2025-09-03 DIAGNOSIS — Z98.890 S/P CERVICAL DISC REPLACEMENT: ICD-10-CM

## 2025-09-03 RX ORDER — OXYCODONE HYDROCHLORIDE 5 MG/1
5 TABLET ORAL EVERY 6 HOURS PRN
Qty: 60 TABLET | Refills: 0 | Status: SHIPPED | OUTPATIENT
Start: 2025-09-03

## 2025-09-03 RX ORDER — OXYCODONE HYDROCHLORIDE 5 MG/1
5 TABLET ORAL EVERY 6 HOURS PRN
Qty: 60 TABLET | Refills: 0 | OUTPATIENT
Start: 2025-09-03

## 2025-09-03 RX ORDER — METHOCARBAMOL 750 MG/1
750 TABLET, FILM COATED ORAL 3 TIMES DAILY
Qty: 90 TABLET | Refills: 11 | Status: SHIPPED | OUTPATIENT
Start: 2025-09-03

## 2025-09-03 ASSESSMENT — PAIN SCALES - GENERAL: PAINLEVEL_OUTOF10: SEVERE PAIN (7)

## (undated) RX ORDER — BETAMETHASONE SODIUM PHOSPHATE AND BETAMETHASONE ACETATE 3; 3 MG/ML; MG/ML
INJECTION, SUSPENSION INTRA-ARTICULAR; INTRALESIONAL; INTRAMUSCULAR; SOFT TISSUE
Status: DISPENSED
Start: 2022-11-08

## (undated) RX ORDER — FENTANYL CITRATE 50 UG/ML
INJECTION, SOLUTION INTRAMUSCULAR; INTRAVENOUS
Status: DISPENSED
Start: 2021-12-24

## (undated) RX ORDER — LIDOCAINE HYDROCHLORIDE 10 MG/ML
INJECTION, SOLUTION EPIDURAL; INFILTRATION; INTRACAUDAL; PERINEURAL
Status: DISPENSED
Start: 2024-07-25

## (undated) RX ORDER — OXYCODONE HYDROCHLORIDE 5 MG/1
TABLET ORAL
Status: DISPENSED
Start: 2021-12-24

## (undated) RX ORDER — DEXAMETHASONE SODIUM PHOSPHATE 10 MG/ML
INJECTION, SOLUTION INTRAMUSCULAR; INTRAVENOUS
Status: DISPENSED
Start: 2022-11-08

## (undated) RX ORDER — LIDOCAINE HYDROCHLORIDE 10 MG/ML
INJECTION, SOLUTION INFILTRATION; PERINEURAL
Status: DISPENSED
Start: 2022-02-11

## (undated) RX ORDER — OXYCODONE HYDROCHLORIDE 5 MG/1
TABLET ORAL
Status: DISPENSED
Start: 2021-12-23

## (undated) RX ORDER — LIDOCAINE HYDROCHLORIDE 10 MG/ML
INJECTION, SOLUTION INFILTRATION; PERINEURAL
Status: DISPENSED
Start: 2024-05-23

## (undated) RX ORDER — BETAMETHASONE SODIUM PHOSPHATE AND BETAMETHASONE ACETATE 3; 3 MG/ML; MG/ML
INJECTION, SUSPENSION INTRA-ARTICULAR; INTRALESIONAL; INTRAMUSCULAR; SOFT TISSUE
Status: DISPENSED
Start: 2022-02-11

## (undated) RX ORDER — LIDOCAINE HYDROCHLORIDE 10 MG/ML
INJECTION, SOLUTION EPIDURAL; INFILTRATION; INTRACAUDAL; PERINEURAL
Status: DISPENSED
Start: 2022-11-08

## (undated) RX ORDER — LIDOCAINE HYDROCHLORIDE 10 MG/ML
INJECTION, SOLUTION EPIDURAL; INFILTRATION; INTRACAUDAL; PERINEURAL
Status: DISPENSED
Start: 2024-05-23

## (undated) RX ORDER — IBUPROFEN 400 MG/1
TABLET, FILM COATED ORAL
Status: DISPENSED
Start: 2019-11-22

## (undated) RX ORDER — KETOROLAC TROMETHAMINE 15 MG/ML
INJECTION, SOLUTION INTRAMUSCULAR; INTRAVENOUS
Status: DISPENSED
Start: 2021-12-23

## (undated) RX ORDER — SODIUM CHLORIDE 9 MG/ML
INJECTION, SOLUTION INTRAVENOUS
Status: DISPENSED
Start: 2021-12-24

## (undated) RX ORDER — BACLOFEN 10 MG/1
TABLET ORAL
Status: DISPENSED
Start: 2021-12-23

## (undated) RX ORDER — LIDOCAINE HYDROCHLORIDE 10 MG/ML
INJECTION, SOLUTION INFILTRATION; PERINEURAL
Status: DISPENSED
Start: 2024-07-25

## (undated) RX ORDER — LIDOCAINE HYDROCHLORIDE 10 MG/ML
INJECTION, SOLUTION INFILTRATION; PERINEURAL
Status: DISPENSED
Start: 2022-11-08

## (undated) RX ORDER — LIDOCAINE 50 MG/G
PATCH TOPICAL
Status: DISPENSED
Start: 2021-12-23

## (undated) RX ORDER — ACETAMINOPHEN 500 MG
TABLET ORAL
Status: DISPENSED
Start: 2021-12-23

## (undated) RX ORDER — DEXAMETHASONE SODIUM PHOSPHATE 10 MG/ML
INJECTION, SOLUTION INTRAMUSCULAR; INTRAVENOUS
Status: DISPENSED
Start: 2024-07-25